# Patient Record
Sex: FEMALE | Race: WHITE | Employment: OTHER | URBAN - METROPOLITAN AREA
[De-identification: names, ages, dates, MRNs, and addresses within clinical notes are randomized per-mention and may not be internally consistent; named-entity substitution may affect disease eponyms.]

---

## 2018-06-07 ENCOUNTER — TELEPHONE (OUTPATIENT)
Dept: CARDIOLOGY CLINIC | Age: 63
End: 2018-06-07

## 2018-06-07 ENCOUNTER — OFFICE VISIT (OUTPATIENT)
Dept: ORTHOPEDIC SURGERY | Age: 63
End: 2018-06-07

## 2018-06-07 VITALS — WEIGHT: 164 LBS | HEIGHT: 66 IN | BODY MASS INDEX: 26.36 KG/M2

## 2018-06-07 VITALS — HEIGHT: 66 IN | WEIGHT: 164.9 LBS | BODY MASS INDEX: 26.5 KG/M2

## 2018-06-07 DIAGNOSIS — M16.11 PRIMARY OSTEOARTHRITIS OF RIGHT HIP: Primary | ICD-10-CM

## 2018-06-07 DIAGNOSIS — R07.2 PRECORDIAL PAIN: Primary | ICD-10-CM

## 2018-06-07 DIAGNOSIS — R07.2 PRECORDIAL PAIN: ICD-10-CM

## 2018-06-07 DIAGNOSIS — I25.119 CORONARY ARTERY DISEASE INVOLVING NATIVE CORONARY ARTERY OF NATIVE HEART WITH ANGINA PECTORIS (HCC): Primary | ICD-10-CM

## 2018-06-07 DIAGNOSIS — I25.119 CORONARY ARTERY DISEASE INVOLVING NATIVE CORONARY ARTERY OF NATIVE HEART WITH ANGINA PECTORIS (HCC): ICD-10-CM

## 2018-06-07 DIAGNOSIS — M87.051 AVASCULAR NECROSIS OF BONE OF HIP, RIGHT (HCC): ICD-10-CM

## 2018-06-07 PROCEDURE — 99214 OFFICE O/P EST MOD 30 MIN: CPT | Performed by: ORTHOPAEDIC SURGERY

## 2018-06-07 RX ORDER — AMLODIPINE BESYLATE 5 MG/1
5 TABLET ORAL DAILY
COMMUNITY
End: 2018-09-10 | Stop reason: SDUPTHER

## 2018-06-07 RX ORDER — FLUOXETINE HYDROCHLORIDE 20 MG/1
20 CAPSULE ORAL DAILY
COMMUNITY
End: 2020-01-27 | Stop reason: ALTCHOICE

## 2018-06-13 ENCOUNTER — HOSPITAL ENCOUNTER (OUTPATIENT)
Dept: CARDIOLOGY | Facility: CLINIC | Age: 63
Discharge: HOME | End: 2018-06-13
Attending: INTERNAL MEDICINE | Admitting: INTERNAL MEDICINE

## 2018-06-13 DIAGNOSIS — R07.2 PRECORDIAL PAIN: ICD-10-CM

## 2018-06-13 LAB
LV EF: 50 %
LVEF MODALITY: NORMAL

## 2018-06-14 ENCOUNTER — HOSPITAL ENCOUNTER (OUTPATIENT)
Dept: CARDIOLOGY | Facility: CLINIC | Age: 63
Discharge: HOME | End: 2018-06-14
Attending: INTERNAL MEDICINE | Admitting: INTERNAL MEDICINE

## 2018-06-14 DIAGNOSIS — R07.2 PRECORDIAL PAIN: ICD-10-CM

## 2018-06-14 LAB
LV EF: 66 %
LVEF MODALITY: NORMAL

## 2018-06-18 ENCOUNTER — OFFICE VISIT (OUTPATIENT)
Dept: CARDIOLOGY CLINIC | Age: 63
End: 2018-06-18

## 2018-06-18 VITALS
SYSTOLIC BLOOD PRESSURE: 120 MMHG | BODY MASS INDEX: 25.23 KG/M2 | WEIGHT: 157 LBS | OXYGEN SATURATION: 96 % | DIASTOLIC BLOOD PRESSURE: 68 MMHG | HEART RATE: 67 BPM | HEIGHT: 66 IN

## 2018-06-18 DIAGNOSIS — I25.10 CORONARY ARTERY DISEASE INVOLVING NATIVE CORONARY ARTERY OF NATIVE HEART WITHOUT ANGINA PECTORIS: ICD-10-CM

## 2018-06-18 DIAGNOSIS — I10 ESSENTIAL HYPERTENSION: ICD-10-CM

## 2018-06-18 DIAGNOSIS — Z01.810 PREOP CARDIOVASCULAR EXAM: Primary | ICD-10-CM

## 2018-06-18 DIAGNOSIS — I48.91 ATRIAL FIBRILLATION, UNSPECIFIED TYPE (HCC): ICD-10-CM

## 2018-06-18 PROCEDURE — 99215 OFFICE O/P EST HI 40 MIN: CPT | Performed by: INTERNAL MEDICINE

## 2018-06-18 RX ORDER — FUROSEMIDE 20 MG/1
20 TABLET ORAL 2 TIMES DAILY
Qty: 60 TABLET | Refills: 6
Start: 2018-06-18 | End: 2020-12-07

## 2018-06-18 RX ORDER — LOSARTAN POTASSIUM 100 MG/1
100 TABLET ORAL DAILY
Qty: 30 TABLET | Refills: 6
Start: 2018-06-18 | End: 2018-09-20 | Stop reason: SDUPTHER

## 2018-06-18 RX ORDER — ASPIRIN 81 MG/1
81 TABLET ORAL DAILY
Qty: 30 TABLET | Refills: 3 | Status: SHIPPED | OUTPATIENT
Start: 2018-06-18 | End: 2018-10-20 | Stop reason: SDUPTHER

## 2018-06-19 ENCOUNTER — HOSPITAL ENCOUNTER (OUTPATIENT)
Dept: OTHER | Age: 63
Discharge: HOME | End: 2018-06-19
Attending: ORTHOPAEDIC SURGERY | Admitting: ORTHOPAEDIC SURGERY

## 2018-06-19 ENCOUNTER — TELEPHONE (OUTPATIENT)
Dept: ORTHOPEDIC SURGERY | Age: 63
End: 2018-06-19

## 2018-06-19 LAB
ALBUMIN SERPL-MCNC: 4.6 G/DL (ref 3.4–5)
ANION GAP SERPL CALCULATED.3IONS-SCNC: 18 MMOL/L (ref 3–16)
APTT: 35.6 SEC (ref 26–36)
BASOPHILS ABSOLUTE: 0.1 K/UL (ref 0–0.2)
BASOPHILS RELATIVE PERCENT: 0.8 %
BILIRUBIN URINE: NEGATIVE
BLOOD, URINE: NEGATIVE
BUN BLDV-MCNC: 14 MG/DL (ref 7–20)
CALCIUM SERPL-MCNC: 9.6 MG/DL (ref 8.3–10.6)
CHLORIDE BLD-SCNC: 98 MMOL/L (ref 99–110)
CLARITY: CLEAR
CO2: 24 MMOL/L (ref 21–32)
COLOR: YELLOW
CREAT SERPL-MCNC: 0.6 MG/DL (ref 0.6–1.2)
EOSINOPHILS ABSOLUTE: 0.2 K/UL (ref 0–0.6)
EOSINOPHILS RELATIVE PERCENT: 3.6 %
GFR AFRICAN AMERICAN: >60
GFR NON-AFRICAN AMERICAN: >60
GLUCOSE BLD-MCNC: 122 MG/DL (ref 70–99)
GLUCOSE URINE: NEGATIVE MG/DL
HCT VFR BLD CALC: 34.8 % (ref 36–48)
HEMOGLOBIN: 12 G/DL (ref 12–16)
INR BLD: 1.13 (ref 0.86–1.14)
KETONES, URINE: NEGATIVE MG/DL
LEUKOCYTE ESTERASE, URINE: NEGATIVE
LYMPHOCYTES ABSOLUTE: 2.6 K/UL (ref 1–5.1)
LYMPHOCYTES RELATIVE PERCENT: 40.1 %
MCH RBC QN AUTO: 29.9 PG (ref 26–34)
MCHC RBC AUTO-ENTMCNC: 34.6 G/DL (ref 31–36)
MCV RBC AUTO: 86.3 FL (ref 80–100)
MICROSCOPIC EXAMINATION: NORMAL
MONOCYTES ABSOLUTE: 0.7 K/UL (ref 0–1.3)
MONOCYTES RELATIVE PERCENT: 11 %
NEUTROPHILS ABSOLUTE: 2.9 K/UL (ref 1.7–7.7)
NEUTROPHILS RELATIVE PERCENT: 44.5 %
NITRITE, URINE: NEGATIVE
PDW BLD-RTO: 14.1 % (ref 12.4–15.4)
PH UA: 6
PLATELET # BLD: 265 K/UL (ref 135–450)
PMV BLD AUTO: 8.3 FL (ref 5–10.5)
POTASSIUM SERPL-SCNC: 3.9 MMOL/L (ref 3.5–5.1)
PROTEIN UA: NEGATIVE MG/DL
PROTHROMBIN TIME: 12.9 SEC (ref 9.8–13)
RBC # BLD: 4.03 M/UL (ref 4–5.2)
SODIUM BLD-SCNC: 140 MMOL/L (ref 136–145)
SPECIFIC GRAVITY UA: <=1.005
TRANSFERRIN: 319 MG/DL (ref 200–360)
URINE TYPE: NORMAL
UROBILINOGEN, URINE: 0.2 E.U./DL
WBC # BLD: 6.5 K/UL (ref 4–11)

## 2018-06-20 LAB — URINE CULTURE, ROUTINE: NORMAL

## 2018-06-22 ENCOUNTER — TELEPHONE (OUTPATIENT)
Dept: CARDIOLOGY CLINIC | Age: 63
End: 2018-06-22

## 2018-06-26 ENCOUNTER — TELEPHONE (OUTPATIENT)
Dept: ORTHOPEDIC SURGERY | Age: 63
End: 2018-06-26

## 2018-06-28 PROBLEM — Z96.641 HISTORY OF TOTAL RIGHT HIP REPLACEMENT: Status: ACTIVE | Noted: 2018-06-28

## 2018-07-03 ENCOUNTER — TELEPHONE (OUTPATIENT)
Dept: ORTHOPEDIC SURGERY | Age: 63
End: 2018-07-03

## 2018-07-03 NOTE — TELEPHONE ENCOUNTER
Spoke with pt, doing pretty good. Incision status: No drainage or redness    Edema/Swelling: Still swelling, icing is helping. Pain level and status: Doing good    Use of pain medications: 1 percocet as needed.      Blood thinner: Eliquis going well    Bowels: Had BM, going fine    Home Care [facility] active: [facility]     Outpatient therapy: NA    Do you have all of your medications: Yes    Changes in medications: No    Ortho Vitals: NA    Follow up appointments:    Future Appointments  Date Time Provider Hiwot Pretty   7/13/2018 10:45 AM SKYLAR Dalal [facility]

## 2018-07-05 ENCOUNTER — TELEPHONE (OUTPATIENT)
Dept: ORTHOPEDIC SURGERY | Age: 63
End: 2018-07-05

## 2018-07-05 NOTE — TELEPHONE ENCOUNTER
Patient reports that she is getting some rash around her incision. She had the nurse looked at it. It does not look infected, just irritating. The area was cleansed and dried today. She is going to take some Benadryl for the itching and try to avoid excoriation. If her symptoms worsen, she will call the office for an antibiotic. Otherwise we will plan to see her as scheduled next week.

## 2018-07-11 ENCOUNTER — TELEPHONE (OUTPATIENT)
Dept: ORTHOPEDIC SURGERY | Age: 63
End: 2018-07-11

## 2018-07-13 ENCOUNTER — OFFICE VISIT (OUTPATIENT)
Dept: ORTHOPEDIC SURGERY | Age: 63
End: 2018-07-13

## 2018-07-13 VITALS — HEIGHT: 66 IN | WEIGHT: 164 LBS | BODY MASS INDEX: 26.36 KG/M2

## 2018-07-13 DIAGNOSIS — Z96.641 HISTORY OF TOTAL RIGHT HIP REPLACEMENT: Primary | ICD-10-CM

## 2018-07-13 PROCEDURE — 99024 POSTOP FOLLOW-UP VISIT: CPT | Performed by: PHYSICIAN ASSISTANT

## 2018-07-18 PROBLEM — Z01.810 PREOP CARDIOVASCULAR EXAM: Status: RESOLVED | Noted: 2018-06-18 | Resolved: 2018-07-18

## 2018-07-19 ENCOUNTER — TELEPHONE (OUTPATIENT)
Dept: ORTHOPEDIC SURGERY | Age: 63
End: 2018-07-19

## 2018-07-19 NOTE — TELEPHONE ENCOUNTER
Nurse Navigator called patient for one final follow up:  Pt doing pretty good, switched to a cane today. Started with out pt PT on Monday. Pt has no questions or concerns. Signed off from pt. Instructed pt to call RN or [place] office with any issues or concerns.     Cecelia Hernandes  Orthopedic Nurse Navigator  Phone number: [phone]

## 2018-07-23 ENCOUNTER — HOSPITAL ENCOUNTER (OUTPATIENT)
Dept: PHYSICAL THERAPY | Age: 63
Setting detail: THERAPIES SERIES
Discharge: HOME | End: 2018-07-23
Payer: COMMERCIAL

## 2018-07-23 PROCEDURE — G8979 MOBILITY GOAL STATUS: HCPCS

## 2018-07-23 PROCEDURE — 97161 PT EVAL LOW COMPLEX 20 MIN: CPT

## 2018-07-23 PROCEDURE — G8978 MOBILITY CURRENT STATUS: HCPCS

## 2018-07-23 ASSESSMENT — PAIN DESCRIPTION - PAIN TYPE: TYPE: ACUTE PAIN

## 2018-07-23 ASSESSMENT — PAIN DESCRIPTION - LOCATION: LOCATION: HIP

## 2018-07-23 ASSESSMENT — PAIN DESCRIPTION - ORIENTATION: ORIENTATION: RIGHT

## 2018-07-23 ASSESSMENT — PAIN SCALES - GENERAL: PAINLEVEL_OUTOF10: 3

## 2018-07-23 NOTE — PROGRESS NOTES
blood flow stopped to the socket. Patient has had significant pain since then. Patient had an ill  and did not get right hip checked until the end of May 2018. Patient had MRI showing need for a total hip. Patient had right total hip on June 28, 2018. Patient had therapy in the home until 7/20. Patient is now full weightbearing.         Pain:  Patient Currently in Pain: Yes  Pain Assessment: 0-10  Pain Level: 3  Pain Type: Acute pain  Pain Location: Hip  Pain Orientation: Right     Social/Functional History:    Lives With: Other (comment) (Currently living with sister)  Type of Home: House  Home Layout: One level  Home Access: Stairs to enter with rails  Entrance Stairs - Number of Steps: 2 steps  Home Equipment: Cane, Standard walker     Bathroom Shower/Tub: Shower chair without back  [employer]: Grab bars in shower             Homemaking Responsibilities: No  Ambulation Assistance: Independent  Transfer Assistance: Independent    Active : No  Occupation: Retired    Objective  Overall Orientation Status: Within Normal Limits             Vision: Within Functional Limits    Hearing: Within functional limits      Observation/Palpation  Palpation: Right hip and IT band tender with pressure           R SLR: 90  R Hip ABduction 0-45: 32         L Hip Flexion 0-125: 109  L Hip ABduction 0-45: 43     R Hip Flexion: 3-/5  R Hip ABduction: 3-/5    L Hip Flexion: 5/5  L Hip ABduction: 5/5  L Hip ADduction: 5/5  L Knee Flexion: 5/5  L Knee Extension: 5/5    Tone RLE  RLE Tone: Normotonic  Tone LLE  LLE Tone: Normotonic                  Overall Sensation Status: WNL           Supine to Sit: Modified independent  Sit to Supine: Modified independent                Transfers  Sit to Stand: Modified independent  Stand to sit: Modified independent            WB Status: WBAT  Ambulation 1  Surface: carpet  Device: Single point cane  Assistance: Modified Independent  Quality of Gait: Decreased pace, decreased heel strike to toe off, decreased stride length. Distance: 150 feet                               Exercises  Exercise 1: Next session: SLR, supine hip abduction, glut set, heel slide, quad set  Exercise 2: Next sessoin: NuStep  Exercise 3: Next session: 3-way hip  Exercise 4: Next session: heel/toe raise  Exercise 5: Next session: march, mini squat, hamstring curl                          Activity Tolerance:  Activity Tolerance: Patient Tolerated treatment well    Assessment: Body structures, Functions, Activity limitations: Decreased functional mobility , Decreased ROM, Decreased ADL status, Decreased strength, Decreased endurance, Decreased sensation, Decreased balance  Prognosis: Good  REQUIRES PT FOLLOW UP: Yes  Discharge Recommendations: Continue to assess pending progress    Patient Education:  Patient Education: Patient educated on POC and HEP                   Plan:  Times per week: 2-3 times per week  Plan weeks: 12 weeks  Specific instructions for Next Treatment: Core and LE stretches and strengthening, ambulation, balance, modalities as needed  Current Treatment Recommendations: Strengthening, ROM, Balance Training, Endurance Training, Stair training, Pain Management, Positioning, Aquatics, Modalities, Gait Training, Manual Therapy - Soft Tissue Mobilization, Transfer Training, Functional Mobility Training, Home Exercise Program  Plan Comment: POC initiated    History: Personal factors or comorbidities that impact plan of care -  Moderate Complexity: 1-2 personal factors or comorbidities. See history section above for details. Examination: Body structures and functions, activity limitations, participation restrictions; using standardized tests and measures - Low Complexity: 1-2 body structures and functional, activity limitation and/or participation restrictions. See restrictions and objective section above for details. Clinical Presentation: Low - Stable and Uncomplicated: Right total hip. Progressing well. Decision Making: Low Complexity due to see above. Decision making was based on patient assessment and decision making process in determining plan of care and establishing reasonable expectations for measurable functional outcomes. Evaluation Complexity: Based on the findings of patient history, examination, clinical presentation, and decision making during this evaluation, the evaluation of Aleida Camarillo  is of low complexity. Goals:  Patient goals : \"Walk without assistance\"    Short term goals  Time Frame for Short term goals: 4 weeks  Short term goal 1: Patient able to ambulate with straight cane with good pace, and no gait deviations. Short term goal 2: Decrease right hip pain to 1/10 to assist with sleeping at night. Short term goal 3: Increase right hip strength to 4+/5 to assist with stability on steps. Short term goal 4: Improve balance to allow patient to SLS on right x3 seconds to assist with getting dressed. Long term goals  Time Frame for Long term goals : 12 weeks  Long term goal 1: Independent with HEP and with progression to assist with decreasing pain. PT G-Codes  Functional Assessment Tool Used: Pain Scale  Score: 3/10  Functional Limitation: Mobility: Walking and moving around  Mobility: Walking and Moving Around Current Status (): At least 20 percent but less than 40 percent impaired, limited or restricted  Mobility: Walking and Moving Around Goal Status ():  At least 1 percent but less than 20 percent impaired, limited or restricted    Missael Yañez

## 2018-07-25 ENCOUNTER — HOSPITAL ENCOUNTER (OUTPATIENT)
Dept: PHYSICAL THERAPY | Age: 63
Setting detail: THERAPIES SERIES
Discharge: HOME | End: 2018-07-25
Payer: COMMERCIAL

## 2018-07-25 PROCEDURE — 97110 THERAPEUTIC EXERCISES: CPT

## 2018-07-25 RX ORDER — APIXABAN 5 MG/1
TABLET, FILM COATED ORAL
Qty: 90 TABLET | Refills: 2 | Status: SHIPPED | OUTPATIENT
Start: 2018-07-25 | End: 2018-09-20 | Stop reason: SDUPTHER

## 2018-07-25 ASSESSMENT — PAIN SCALES - GENERAL: PAINLEVEL_OUTOF10: 3

## 2018-07-25 NOTE — PROGRESS NOTES
[place]     Time In: 1400  Time Out: 1447  Minutes: 47  Timed Code Treatment Minutes: 47 Minutes     Date: 2018  Patient Name: Kayla Blum,  Gender:  female        CSN: [id number]   : 1955  (58 y.o.)  Referral Date : 18    Referring Practitioner: Meme Bolanos MD      Diagnosis: Right Total Hip Replacement  Treatment Diagnosis: Difficulty with ambulation, Right hip pain   Additional Pertinent Hx: High Blood Pressure, Irregular Heart Beat, Asthma, Diabetes, Anxiety Disorder, Short of Breath, Total Hip Precautions       General:  PT Visit Information  Onset Date: 18  PT Insurance Information: [employer] BC/BS -75 VISITS PT/OT/ST COMBINED PER CALENDAR YEAR (2 VISITS USED). Aquatics and modalities are covered. Total # of Visits Approved: 73  Total # of Visits to Date: 2  Plan of Care/Certification Expiration Date: 10/15/18  Progress Note Counter: 2/10 for PN             Subjective:  Chart Reviewed: Yes  Patient assessed for rehabilitation services?: Yes  Family / Caregiver Present: Yes  Comments: Return to doctor when therapy is over. Subjective: Patient states that she is having pain in the side of her right hip. Patient walking with cane but wanting to get rid of the cane as soon as she is able.      Pain:  Patient Currently in Pain: Yes  Pain Assessment: 0-10  Pain Level: 3 (Right hip)    Objective         Exercises  Exercise 1: Reviewed SLR, supine hip abduction, glut set, heel slide, quad set x10-20 each - patient is doing at home from home health  Exercise 2: NuStep level 2 x6 minutes  Exercise 3: 3-way hip x10 bilateral   Exercise 4: Heel/toe raise, march, mini squat, hamstring curl x10 bilateral  Exercise 5: Rockerboard 2 directions x10 each; Balance 30 seconds each way  Exercise 6: Balance on Blue foam: step stance bilateral x30 seconds each        Activity Tolerance:  Activity Tolerance: Patient Tolerated treatment well  Activity Tolerance: Pain still 3/10 at end of session. Assessment: Body structures, Functions, Activity limitations: Decreased functional mobility , Decreased ROM, Decreased ADL status, Decreased strength, Decreased endurance, Decreased sensation, Decreased balance  Assessment: Patient tolerated activities well with no complaints of increased pain at end of session. Patient ambulated short distances without cane but had increased antalgia. Therapist raised cane 2 notches and patient demonstrated less limp and improved posture. Not ready to be without the cane at this time and to work on gait pattern. Prognosis: Good  REQUIRES PT FOLLOW UP: Yes  Discharge Recommendations: Continue to assess pending progress    Patient Education:  Patient Education: Continue HEP     Plan:  Times per week: 2-3 times per week  Plan weeks: 12 weeks  Specific instructions for Next Treatment: Core and LE stretches and strengthening, ambulation, balance, modalities as needed  Current Treatment Recommendations: Strengthening, ROM, Balance Training, Endurance Training, Stair training, Pain Management, Positioning, Aquatics, Modalities, Gait Training, Manual Therapy - Soft Tissue Mobilization, Transfer Training, Functional Mobility Training, Home Exercise Program  Plan Comment: Continue with current POC    Goals:  Patient goals : \"Walk without assistance\"    Short term goals  Time Frame for Short term goals: 4 weeks  Short term goal 1: Patient able to ambulate with straight cane with good pace, and no gait deviations. Short term goal 2: Decrease right hip pain to 1/10 to assist with sleeping at night. Short term goal 3: Increase right hip strength to 4+/5 to assist with stability on steps. Short term goal 4: Improve balance to allow patient to SLS on right x3 seconds to assist with getting dressed.     Long term goals  Time Frame for Long term goals : 12 weeks  Long term goal 1: Independent with HEP and with progression to assist with decreasing pain. Arleth Corrigan.  Crispin Monique, [address] [facility] Emy, 7/25/2018

## 2018-07-27 ENCOUNTER — HOSPITAL ENCOUNTER (OUTPATIENT)
Dept: PHYSICAL THERAPY | Age: 63
Setting detail: THERAPIES SERIES
Discharge: HOME | End: 2018-07-27
Payer: COMMERCIAL

## 2018-07-27 PROCEDURE — 97110 THERAPEUTIC EXERCISES: CPT

## 2018-07-27 ASSESSMENT — PAIN DESCRIPTION - LOCATION: LOCATION: HIP

## 2018-07-27 ASSESSMENT — PAIN SCALES - GENERAL: PAINLEVEL_OUTOF10: 2

## 2018-07-27 ASSESSMENT — PAIN DESCRIPTION - ORIENTATION: ORIENTATION: RIGHT

## 2018-07-27 ASSESSMENT — PAIN DESCRIPTION - PAIN TYPE: TYPE: ACUTE PAIN

## 2018-07-27 NOTE — PROGRESS NOTES
Recommendations: Continue to assess pending progress    Patient Education:  Patient Education: Continue HEP                       Plan:  Times per week: 2-3 times per week  Plan weeks: 12 weeks  Specific instructions for Next Treatment: Core and LE stretches and strengthening, ambulation, balance, modalities as needed  Plan Comment: Continue with current POC    Goals:  Patient goals : \"Walk without assistance\"    Short term goals  Time Frame for Short term goals: 4 weeks  Short term goal 1: Patient able to ambulate with straight cane with good pace, and no gait deviations. Short term goal 2: Decrease right hip pain to 1/10 to assist with sleeping at night. Short term goal 3: Increase right hip strength to 4+/5 to assist with stability on steps. Short term goal 4: Improve balance to allow patient to SLS on right x3 seconds to assist with getting dressed. Long term goals  Time Frame for Long term goals : 12 weeks  Long term goal 1: Independent with HEP and with progression to assist with decreasing pain.          Missael Garcia

## 2018-07-30 ENCOUNTER — HOSPITAL ENCOUNTER (OUTPATIENT)
Dept: PHYSICAL THERAPY | Age: 63
Setting detail: THERAPIES SERIES
Discharge: HOME | End: 2018-07-30
Payer: COMMERCIAL

## 2018-07-30 PROCEDURE — 97110 THERAPEUTIC EXERCISES: CPT

## 2018-07-30 ASSESSMENT — PAIN SCALES - GENERAL: PAINLEVEL_OUTOF10: 4

## 2018-07-30 NOTE — PROGRESS NOTES
[place]     Time In: 1600  Time Out: 1625  Minutes: 25  Timed Code Treatment Minutes: 25 Minutes     Date: 2018  Patient Name: Bienvenido Arndt,  Gender:  female        CSN: [id number]   : 1955  (58 y.o.)  Referral Date : 18    Referring Practitioner: Blake Bailon MD      Diagnosis: Right Total Hip Replacement   Additional Pertinent Hx: High Blood Pressure, Irregular Heart Beat, Asthma, Diabetes, Anxiety Disorder, Short of Breath, Total Hip Precautions                  General:  PT Visit Information  Onset Date: 18  PT Insurance Information: [employer] BC/BS -76 VISITS PT/OT/ST COMBINED PER CALENDAR YEAR (2 VISITS USED). Aquatics and modalities are covered. Total # of Visits Approved: 73  Total # of Visits to Date: 4  Plan of Care/Certification Expiration Date: 10/15/18  Progress Note Counter: 4/10 for PN               Subjective:  Comments: Return to doctor when therapy is over. Subjective: Patient reporting increase pain today right leg, not incident to cause. . She also states did more walking yesterday and drove car     Pain:  Patient Currently in Pain: Yes  Pain Assessment: 0-10  Pain Level: 4 (R groin, hip, knee, ankle)      Objective    Exercises  Exercise 2: NuStep level 3 x5 minutes  Exercise 3: 3-way hip x10 bilateral   Exercise 4: Heel/toe raise, march, mini squat, hamstring curl x10 bilateral         Activity Tolerance:  Activity Tolerance: Treatment limited secondary to medical complications (free text)    Assessment:  Assessment: patient with increase c/os pain today,  also c/os dizziness with standing ex, decrease HR( has Apple watch to moniter)--she needed few minutes to lie down. HR increased and dizziness resolved.    did not get thru all previous ex.   leg length appears even,  no increase swelling right hip   did states pain a little less after rx    Patient Education:  Patient Education: continue ice , OTC,  back off some of ex                      Plan:  Times per week: 2-3 times per week  Plan weeks: 12 weeks  Specific instructions for Next Treatment: Core and LE stretches and strengthening, ambulation, balance, modalities as needed  Current Treatment Recommendations: Strengthening, ROM, Balance Training, Endurance Training, Stair training, Pain Management, Positioning, Aquatics, Modalities, Gait Training, Manual Therapy - Soft Tissue Mobilization, Transfer Training, Functional Mobility Training, Home Exercise Program  Plan Comment: Continue with current POC    Goals:  Patient goals : \"Walk without assistance\"    Short term goals  Time Frame for Short term goals: 4 weeks  Short term goal 1: Patient able to ambulate with straight cane with good pace, and no gait deviations. Short term goal 2: Decrease right hip pain to 1/10 to assist with sleeping at night. Short term goal 3: Increase right hip strength to 4+/5 to assist with stability on steps. Short term goal 4: Improve balance to allow patient to SLS on right x3 seconds to assist with getting dressed. Long term goals  Time Frame for Long term goals : 12 weeks  Long term goal 1: Independent with HEP and with progression to assist with decreasing pain.          Neris Fan PTA

## 2018-08-01 ENCOUNTER — HOSPITAL ENCOUNTER (OUTPATIENT)
Dept: PHYSICAL THERAPY | Age: 63
Setting detail: THERAPIES SERIES
Discharge: HOME | End: 2018-08-01
Payer: COMMERCIAL

## 2018-08-01 PROCEDURE — 97110 THERAPEUTIC EXERCISES: CPT

## 2018-08-01 ASSESSMENT — PAIN SCALES - GENERAL: PAINLEVEL_OUTOF10: 2

## 2018-08-01 ASSESSMENT — PAIN DESCRIPTION - PAIN TYPE: TYPE: ACUTE PAIN

## 2018-08-01 NOTE — PROGRESS NOTES
[place]     Time In: 1330  Time Out: 1408  Minutes: 38  Timed Code Treatment Minutes: 38 Minutes     Date: 2018  Patient Name: Amaury Huizar,  Gender:  female        CSN: [id number]   : 1955  (58 y.o.)  Referral Date : 18    Referring Practitioner: Mark Barrera MD      Diagnosis: Right Total Hip Replacement   Additional Pertinent Hx: High Blood Pressure, Irregular Heart Beat, Asthma, Diabetes, Anxiety Disorder, Short of Breath, Total Hip Precautions                  General:  PT Visit Information  Onset Date: 18  PT Insurance Information: [employer]/BS -76 VISITS PT/OT/ST COMBINED PER CALENDAR YEAR (2 VISITS USED). Aquatics and modalities are covered. Total # of Visits Approved: 73  Total # of Visits to Date: 5  Plan of Care/Certification Expiration Date: 10/15/18  Progress Note Counter: 5/10 for PN               Subjective:  Comments: Return to doctor when therapy is over. Subjective: Patient state much less pain right hip today,  no pain lower leg. .  She states yesterday did not do much, rested more     Pain:  Patient Currently in Pain: Yes  Pain Assessment: 0-10  Pain Level: 2  Pain Type: Acute pain (right hip and groin)      Objective                                                                       Exercises  Exercise 2: NuStep level 1 x5 minutes  Exercise 3: 3-way hip x15 bilateral   Exercise 4: Heel/toe raise, march, mini squat, hamstring curl x15 bilateral  Exercise 5: Rockerboard 2 directions x15 each; Balance 30 seconds each way  Exercise 6: Balance on Blue foam: step stance bilateral x30 seconds each   Exercise 9: step stance at hydrostick --for/back, side/side x10 each,   switch   Exercise 10: in hallway sidestep along railing x1 lap, light touch,    walk backwards , using cane and railing x1 lap--slow guarded but no increase pain         Activity Tolerance:  Activity Tolerance: Patient Tolerated treatment well    Assessment:  Assessment: doing better today, no rest breaks needed, no c/os dizziness. increased reps to 15 most ex,   added hydrostick and sideways, backwards walking       Patient Education:  Patient Education: progress activity at home slowly                      Plan:  Times per week: 2-3 times per week  Plan weeks: 12 weeks  Specific instructions for Next Treatment: Core and LE stretches and strengthening, ambulation, balance, modalities as needed  Current Treatment Recommendations: Strengthening, ROM, Balance Training, Endurance Training, Stair training, Pain Management, Positioning, Aquatics, Modalities, Gait Training, Manual Therapy - Soft Tissue Mobilization, Transfer Training, Functional Mobility Training, Home Exercise Program  Plan Comment: Continue with current POC    Goals:  Patient goals : \"Walk without assistance\"    Short term goals  Time Frame for Short term goals: 4 weeks  Short term goal 1: Patient able to ambulate with straight cane with good pace, and no gait deviations. Short term goal 2: Decrease right hip pain to 1/10 to assist with sleeping at night. Short term goal 3: Increase right hip strength to 4+/5 to assist with stability on steps. Short term goal 4: Improve balance to allow patient to SLS on right x3 seconds to assist with getting dressed. Long term goals  Time Frame for Long term goals : 12 weeks  Long term goal 1: Independent with HEP and with progression to assist with decreasing pain.          Jordan Fan PTA

## 2018-08-03 ENCOUNTER — HOSPITAL ENCOUNTER (OUTPATIENT)
Dept: PHYSICAL THERAPY | Age: 63
Setting detail: THERAPIES SERIES
Discharge: HOME | End: 2018-08-03
Payer: COMMERCIAL

## 2018-08-03 PROCEDURE — 97110 THERAPEUTIC EXERCISES: CPT

## 2018-08-03 ASSESSMENT — PAIN SCALES - GENERAL: PAINLEVEL_OUTOF10: 1

## 2018-08-03 ASSESSMENT — PAIN DESCRIPTION - ORIENTATION: ORIENTATION: RIGHT

## 2018-08-03 ASSESSMENT — PAIN DESCRIPTION - LOCATION: LOCATION: HIP

## 2018-08-03 ASSESSMENT — PAIN DESCRIPTION - PAIN TYPE: TYPE: ACUTE PAIN

## 2018-08-03 NOTE — PROGRESS NOTES
[facility]     Time In: 1430  Time Out: 1500  Minutes: 30  Timed Code Treatment Minutes: 30 Minutes     Date: 8/3/2018  Patient Name: Sharif Ji,  Gender:  female        CSN: [id number]   : 1955  (58 y.o.)       Referring Practitioner: Bean Galeano MD      Diagnosis: Right Total Hip Replacement  Treatment Diagnosis: Difficulty with ambulation, Right hip pain   Additional Pertinent Hx: High Blood Pressure, Irregular Heart Beat, Asthma, Diabetes, Anxiety Disorder, Short of Breath, Total Hip Precautions                  General:  PT Visit Information  Onset Date: 18  PT Insurance Information: [employer] BC/BS -76 VISITS PT/OT/ST COMBINED PER CALENDAR YEAR (2 VISITS USED). Aquatics and modalities are covered. Total # of Visits Approved: 73  Total # of Visits to Date: 6  Progress Note Counter: 6/10 for PN               Subjective:  Family / Caregiver Present: No  Comments: Return to doctor when therapy is over. Subjective: Patient reports she is back to where she was after she flaired up her pain.      Pain:  Patient Currently in Pain: Yes  Pain Assessment: 0-10  Pain Level: 1  Pain Type: Acute pain  Pain Location: Hip  Pain Orientation: Right      Objective    Exercises  Exercise 2: NuStep level 2 x5 minutes  Exercise 3: 3-way hip x15 bilateral   Exercise 4: Heel/toe raise, march, mini squat, hamstring curl x15 bilateral  Exercise 5: Rockerboard 2 directions x15 each; Balance 30 seconds each way  Exercise 6: Balance on Blue foam: step stance bilateral x30 seconds each   Exercise 7: 4inch step ups x10 each forward and lateral  Exercise 9: step stance at hydrostick --for/back, side/side x10 each,   switch   Exercise 10: in hallway sidestep along railing x1 lap, light touch,    walk backwards , using cane and railing x1 lap--slow guarded but no increase pain         Activity Tolerance:  Activity Tolerance: Patient Tolerated treatment well    Assessment:  Assessment: No rest breaks, moving better. Prognosis: Good  REQUIRES PT FOLLOW UP: Yes  Discharge Recommendations: Continue to assess pending progress    Patient Education:  Patient Education: Patient educated on working on HEP routinely                      Plan:  Times per week: 2-3 times per week  Plan weeks: 12 weeks  Specific instructions for Next Treatment: Core and LE stretches and strengthening, ambulation, balance, modalities as needed  Plan Comment: Continue with current POC    Goals:  Patient goals : \"Walk without assistance\"    Short term goals  Time Frame for Short term goals: 4 weeks  Short term goal 1: Patient able to ambulate with straight cane with good pace, and no gait deviations. Short term goal 2: Decrease right hip pain to 1/10 to assist with sleeping at night. Short term goal 3: Increase right hip strength to 4+/5 to assist with stability on steps. Short term goal 4: Improve balance to allow patient to SLS on right x3 seconds to assist with getting dressed. Long term goals  Time Frame for Long term goals : 12 weeks  Long term goal 1: Independent with HEP and with progression to assist with decreasing pain.          Missael Mireles

## 2018-08-06 ENCOUNTER — APPOINTMENT (OUTPATIENT)
Dept: PHYSICAL THERAPY | Age: 63
End: 2018-08-06
Payer: COMMERCIAL

## 2018-08-08 ENCOUNTER — APPOINTMENT (OUTPATIENT)
Dept: PHYSICAL THERAPY | Age: 63
End: 2018-08-08
Payer: COMMERCIAL

## 2018-08-10 ENCOUNTER — HOSPITAL ENCOUNTER (OUTPATIENT)
Dept: PHYSICAL THERAPY | Age: 63
Setting detail: THERAPIES SERIES
Discharge: HOME | End: 2018-08-10
Payer: COMMERCIAL

## 2018-08-10 PROCEDURE — 97110 THERAPEUTIC EXERCISES: CPT

## 2018-08-10 ASSESSMENT — PAIN SCALES - GENERAL: PAINLEVEL_OUTOF10: 1

## 2018-08-10 NOTE — PROGRESS NOTES
[place]     Time In: 1300  Time Out: [address]  Minutes: 42  Timed Code Treatment Minutes: 42 Minutes     Date: 8/10/2018  Patient Name: Jostin Andrade,  Gender:  female        CSN: [id number]   : 1955  (58 y.o.)       Referring Practitioner: Kathy Onofre MD      Diagnosis: Right Total Hip Replacement  Treatment Diagnosis: Difficulty with ambulation, Right hip pain   Additional Pertinent Hx: High Blood Pressure, Irregular Heart Beat, Asthma, Diabetes, Anxiety Disorder, Short of Breath, Total Hip Precautions       General:  PT Visit Information  Onset Date: 18  PT Insurance Information: ANTHEM BC/BS -75 VISITS PT/OT/ST COMBINED PER CALENDAR YEAR (2 VISITS USED). Aquatics and modalities are covered. Total # of Visits Approved: 73  Total # of Visits to Date: 7  Progress Note Counter: 7/10 for PN             Subjective:  Family / Caregiver Present: No  Comments: Return to doctor when therapy is over. Subjective: Patient reports she is just a little sore today but not having any pain. States that she feels like she has gotten stronger and she can walk mostly without a limp in the mornings. Pain:  Patient Currently in Pain: Yes  Pain Assessment: 0-10  Pain Level: 1 (Right hip)    Objective         Exercises  Exercise 2: NuStep (seat 9/arms 10) level 3 x5 minutes  Exercise 3: 3-way hip with NON-latex yellow theraband x15 bilateral   Exercise 4:  On blue foam: Heel/toe raise, march, mini squat, hamstring curl x15 bilateral  Exercise 5: Rockerboard 2 directions x20 each; Balance 30 seconds each way  Exercise 6: Balance on Blue foam: step stance bilateral x30 seconds each   Exercise 7: 4 inch step ups forward and lateral  x15 each Bilateral CC  Exercise 9: Hydrostick step stance bilateral forward/back, left/right x10 each way   Exercise 10: Gait at rail in hallway: sidestepping with light Bilateral UE touch to rail x2 laps, backward walking x10 lap with 1 UE at rail - guarded, not pain       Activity Tolerance:  Activity Tolerance: Patient Tolerated treatment well    Assessment: Body structures, Functions, Activity limitations: Decreased functional mobility , Decreased ROM, Decreased ADL status, Decreased strength, Decreased endurance, Decreased sensation, Decreased balance  Assessment: Patient doing well with exercises and able to progress today. Mild antalgia noted with gait but denies pain throughout session. Prognosis: Good  REQUIRES PT FOLLOW UP: Yes  Discharge Recommendations: Continue to assess pending progress    Patient Education:  Patient Education: Patient educated on working on HEP routinely    Plan:  Times per week: 2-3 times per week  Plan weeks: 12 weeks  Specific instructions for Next Treatment: Core and LE stretches and strengthening, ambulation, balance, modalities as needed  Current Treatment Recommendations: Strengthening, ROM, Balance Training, Endurance Training, Stair training, Pain Management, Positioning, Aquatics, Modalities, Gait Training, Manual Therapy - Soft Tissue Mobilization, Transfer Training, Functional Mobility Training, Home Exercise Program  Plan Comment: Continue with current POC    Goals:  Patient goals : \"Walk without assistance\"    Short term goals  Time Frame for Short term goals: 4 weeks  Short term goal 1: Patient able to ambulate with straight cane with good pace, and no gait deviations. Short term goal 2: Decrease right hip pain to 1/10 to assist with sleeping at night. Short term goal 3: Increase right hip strength to 4+/5 to assist with stability on steps. Short term goal 4: Improve balance to allow patient to SLS on right x3 seconds to assist with getting dressed. Long term goals  Time Frame for Long term goals : 12 weeks  Long term goal 1: Independent with HEP and with progression to assist with decreasing pain. Melvin Simpson.  Peyton Guidry, [facility], 8/10/2018

## 2018-08-13 ENCOUNTER — HOSPITAL ENCOUNTER (OUTPATIENT)
Dept: PHYSICAL THERAPY | Age: 63
Setting detail: THERAPIES SERIES
Discharge: HOME | End: 2018-08-13
Payer: COMMERCIAL

## 2018-08-13 PROCEDURE — G8979 MOBILITY GOAL STATUS: HCPCS

## 2018-08-13 PROCEDURE — G8980 MOBILITY D/C STATUS: HCPCS

## 2018-08-13 PROCEDURE — 97110 THERAPEUTIC EXERCISES: CPT

## 2018-08-13 NOTE — PROGRESS NOTES
[place]     Time In: 1244  Time Out: 6800  Minutes: 23  Timed Code Treatment Minutes: 23 Minutes     Date: 2018  Patient Name: Remedios Silveira,  Gender:  female        CSN: [id number]   : 1955  (58 y.o.)  Referral Date : 18    Referring Practitioner: Savannah Garcia MD      Diagnosis: Right Total Hip Replacement  Treatment Diagnosis: Difficulty with ambulation, Right hip pain   Additional Pertinent Hx: High Blood Pressure, Irregular Heart Beat, Asthma, Diabetes, Anxiety Disorder, Short of Breath, Total Hip Precautions                  General:  PT Visit Information  Onset Date: 18  PT Insurance Information: ANTHEM BC/BS -75 VISITS PT/OT/ST COMBINED PER CALENDAR YEAR (2 VISITS USED). Aquatics and modalities are covered. Total # of Visits Approved: 73  Total # of Visits to Date: 8  Progress Note Counter:  for PN               Subjective:  Family / Caregiver Present: No  Comments: Is going to schedule follow up with doctor. Subjective: Patient reports feeling 75% better since beginning therapy. Feels comfortable for discharge. Pain:  Patient Currently in Pain: No         Objective    Other Activities  Other Activities: Re-evaluation performed       Exercises  Exercise 3: HEP printed and patient issued theraband:  3-way hip with NON-latex yellow theraband x15 bilateral   Exercise 4: HEP printed: On blue foam: Heel/toe raise, march, mini squat, hamstring curl x15 bilateral         Activity Tolerance:  Activity Tolerance: Patient Tolerated treatment well    Assessment:  Assessment: Reassessment completed today. Patient very eager to make today her last appointment. Reports is feeling 75% better since starting therapy. Patient does have increased leg strength, less assistance needed with walking, and less pain/soreness. Patient is working on [employer] daily (issued theraband today).   Patient

## 2018-09-10 RX ORDER — AMLODIPINE BESYLATE 5 MG/1
5 TABLET ORAL DAILY
Qty: 30 TABLET | Refills: 3 | Status: SHIPPED | OUTPATIENT
Start: 2018-09-10 | End: 2018-09-20 | Stop reason: SDUPTHER

## 2018-09-10 RX ORDER — AMLODIPINE BESYLATE 5 MG/1
5 TABLET ORAL DAILY
Qty: 90 TABLET | Refills: 3 | Status: SHIPPED | OUTPATIENT
Start: 2018-09-10 | End: 2019-06-06 | Stop reason: SDUPTHER

## 2018-09-18 ENCOUNTER — HOSPITAL ENCOUNTER (OUTPATIENT)
Age: 63
Discharge: HOME | End: 2018-09-18
Payer: COMMERCIAL

## 2018-09-18 LAB
CHOLESTEROL, TOTAL: 130 MG/DL (ref 100–199)
HDLC SERPL-MCNC: 67 MG/DL
LDL CHOLESTEROL CALCULATED: 35 MG/DL
TRIGL SERPL-MCNC: 141 MG/DL (ref 0–199)

## 2018-09-18 PROCEDURE — 80061 LIPID PANEL: CPT

## 2018-09-18 PROCEDURE — 36415 COLL VENOUS BLD VENIPUNCTURE: CPT

## 2018-09-20 ENCOUNTER — OFFICE VISIT (OUTPATIENT)
Dept: CARDIOLOGY CLINIC | Age: 63
End: 2018-09-20

## 2018-09-20 VITALS
HEART RATE: 63 BPM | SYSTOLIC BLOOD PRESSURE: 130 MMHG | BODY MASS INDEX: 25.07 KG/M2 | HEIGHT: 66 IN | DIASTOLIC BLOOD PRESSURE: 80 MMHG | WEIGHT: 156 LBS

## 2018-09-20 DIAGNOSIS — I10 ESSENTIAL HYPERTENSION: ICD-10-CM

## 2018-09-20 DIAGNOSIS — I25.119 CORONARY ARTERY DISEASE INVOLVING NATIVE CORONARY ARTERY OF NATIVE HEART WITH ANGINA PECTORIS (HCC): Primary | ICD-10-CM

## 2018-09-20 DIAGNOSIS — E78.2 MIXED HYPERLIPIDEMIA: ICD-10-CM

## 2018-09-20 DIAGNOSIS — I48.0 PAROXYSMAL ATRIAL FIBRILLATION (HCC): ICD-10-CM

## 2018-09-20 DIAGNOSIS — R06.02 SOB (SHORTNESS OF BREATH): ICD-10-CM

## 2018-09-20 DIAGNOSIS — R42 DIZZINESS: ICD-10-CM

## 2018-09-20 PROCEDURE — 99214 OFFICE O/P EST MOD 30 MIN: CPT | Performed by: INTERNAL MEDICINE

## 2018-09-20 RX ORDER — LOSARTAN POTASSIUM 50 MG/1
50 TABLET ORAL DAILY
Qty: 30 TABLET | Refills: 5 | Status: SHIPPED | OUTPATIENT
Start: 2018-09-20 | End: 2019-02-19 | Stop reason: SDUPTHER

## 2018-09-20 NOTE — PATIENT INSTRUCTIONS
Plan:  1. 30 day cardiac event monitor to assess atrial fibrillation burden. 2. Follow up with Dr. Natalie Gomez for recommendations and management of atrial fibrillation. 3. Decrease  Losartan to 50 mg daily for dizziness. 4. The patient was seen for >25 minutes. >50% of the time was devoted to giving the patient detailed instructions instructions on addressing diet, regular exercise, weight control, smoking abstention, medication compliance, and stress minimization. The patient was provided written and verbal instructions regarding risk factor modification. 5. Establish care with a new primary care physician for regular preventative care and management of non-cardiac medications. 6. Follow up with me in 6 weeks.

## 2018-09-20 NOTE — LETTER
2. Follow up with Dr. Tonny Zurita for recommendations and management of atrial fibrillation. 3. Decrease  Losartan to 50 mg daily for dizziness. 4. The patient was seen for >25 minutes. >50% of the time was devoted to giving the patient detailed instructions instructions on addressing diet, regular exercise, weight control, smoking abstention, medication compliance, and stress minimization. The patient was provided written and verbal instructions regarding risk factor modification. 5. Establish care with a new primary care physician for regular preventative care and management of non-cardiac medications. 6. Follow up with me in 6 weeks. If you have questions, please do not hesitate to call me. I look forward to following Tyesha along with you.     Sincerely,        Elda Mccain MD

## 2018-09-20 NOTE — PROGRESS NOTES
the findings below. EKG:  ECHO:   STRESS TEST:  CATH:  BYPASS:  VASCULAR:    Past Medical History:   has a past medical history of A-fib ([facility] Anemia; CAD (coronary artery disease); Cholelithiasis; Complication of anesthesia; Diabetes mellitus ([facility] GERD (gastroesophageal reflux disease); Hyperlipidemia; Hypertension; MI (myocardial infarction) ([facility] Nausea & vomiting; ARACELIS (obstructive sleep apnea); PONV (postoperative nausea and vomiting); Primary osteoarthritis of right hip; Prolonged emergence from general anesthesia; and Seasonal allergies. Surgical History:   has a past surgical history that includes Coronary angioplasty with stent (1/2009); Rotator cuff repair; Hand surgery; Hysterectomy; Cholecystectomy, laparoscopic (9/7/11); other surgical history (Right, 8.8.15); hip surgery; eye surgery (Bilateral); joint replacement (Right, 06/28/2018); and Hip Arthroplasty (Right, 06/28/2018). Social History:   reports that she quit smoking about 31 years ago. Her smoking use included Cigarettes. She has a 22.50 pack-year smoking history. She has never used smokeless tobacco. She reports that she does not drink alcohol or use drugs. Family History:  No evidence for sudden cardiac death or premature CAD    Medications:  Reviewed and are listed in nursing record. and/or listed below  Outpatient Medications:  Prior to Admission medications    Medication Sig Start Date End Date Taking?  Authorizing Provider   Evolocumab (REPATHA SC) Inject 140 mg into the skin   Yes Historical Provider, MD   amLODIPine (NORVASC) 5 MG tablet Take 1 tablet by mouth daily 9/10/18  Yes Denny Shah MD   apixaban (ELIQUIS) 5 MG TABS tablet Take 1 tablet by mouth 2 times daily 7/18/18  Yes Denny Shah MD   albuterol sulfate  (90 Base) MCG/ACT inhaler Inhale 2 puffs into the lungs every 6 hours as needed for Wheezing   Yes Historical Provider, MD   aspirin EC 81 MG EC tablet Take 1 tablet by mouth daily  Patient taking differently: Take 81 mg by mouth daily Will start after surgery 6/18/18  Yes Safia Man MD   losartan (COZAAR) 100 MG tablet Take 1 tablet by mouth daily 6/18/18  Yes Safia Man MD   furosemide (LASIX) 20 MG tablet Take 1 tablet by mouth 2 times daily  Patient taking differently: Take 20 mg by mouth daily  6/18/18  Yes Safia Man MD   FLUoxetine (PROZAC) 20 MG capsule Take 20 mg by mouth daily   Yes Historical Provider, MD   olopatadine (PATANASE) 0.6 % SOLN nassl soln 2 sprays by Nasal route 2 times daily 8/16/16  Yes Saleem Alonso MD   potassium chloride SA (KLOR-CON M20) 20 MEQ tablet TAKE 1 TABLET DAILY 8/16/16  Yes Saleem Alonso MD   ranolazine (RANEXA) 1000 MG SR tablet Take 1 tablet by mouth 2 times daily  Patient taking differently: Take 500 mg by mouth 2 times daily  4/26/16  Yes Safia Man MD   sotalol (BETAPACE) 80 MG tablet Take 0.5 tablets by mouth 2 times daily 4/25/16  Yes Safia Man MD   nitroGLYCERIN (NITROSTAT) 0.4 MG SL tablet Place 1 tablet under the tongue every 5 minutes as needed for Chest pain 4/25/16  Yes Safia Man MD   omeprazole (PRILOSEC) 40 MG capsule Take 1 capsule by mouth daily 4/12/16  Yes Saleem Alonso MD   montelukast (SINGULAIR) 10 MG tablet TAKE 1 TABLET NIGHTLY  Patient taking differently: Take 10 mg by mouth daily TAKE 1 TABLET NIGHTLY 4/12/16  Yes Saleem Alonso MD   desonide (DESOWEN) 0.05 % cream Apply topically 2 times daily.  4/12/16  Yes Saleem Alonso MD   levothyroxine (SYNTHROID) 25 MCG tablet Take 25 mcg by mouth daily   Yes Historical Provider, MD   metFORMIN ER (GLUCOPHAGE-XR) 500 MG XR tablet Take 500 mg by mouth 2 times daily 1000mg am and  500mg pm   Yes Historical Provider, MD   CRESTOR 40 MG tablet Take 1 tablet by mouth every evening 6/30/15  Yes Saleem Alonso MD   ezetimibe (ZETIA) 10 MG tablet Take 1 tablet by mouth daily 6/30/15  Yes Saleem Alonso MD   Magnesium 400 MG CAPS Take 1 tablet by mouth

## 2018-09-27 RX ORDER — SOTALOL HYDROCHLORIDE 80 MG/1
40 TABLET ORAL 2 TIMES DAILY
Qty: 90 TABLET | Refills: 3 | Status: SHIPPED | OUTPATIENT
Start: 2018-09-27 | End: 2020-09-01

## 2018-10-04 ENCOUNTER — OFFICE VISIT (OUTPATIENT)
Dept: ORTHOPEDIC SURGERY | Age: 63
End: 2018-10-04
Payer: COMMERCIAL

## 2018-10-04 VITALS — HEIGHT: 66 IN | BODY MASS INDEX: 26.36 KG/M2 | WEIGHT: 164 LBS

## 2018-10-04 DIAGNOSIS — Z96.641 STATUS POST TOTAL HIP REPLACEMENT, RIGHT: ICD-10-CM

## 2018-10-04 DIAGNOSIS — M87.051 AVASCULAR NECROSIS OF BONE OF HIP, RIGHT (HCC): Primary | ICD-10-CM

## 2018-10-04 PROCEDURE — 99212 OFFICE O/P EST SF 10 MIN: CPT | Performed by: PHYSICIAN ASSISTANT

## 2018-10-12 ENCOUNTER — TELEPHONE (OUTPATIENT)
Dept: CARDIOLOGY CLINIC | Age: 63
End: 2018-10-12

## 2018-10-19 PROCEDURE — 93228 REMOTE 30 DAY ECG REV/REPORT: CPT | Performed by: INTERNAL MEDICINE

## 2018-10-22 RX ORDER — ASPIRIN 81 MG/1
TABLET, COATED ORAL
Qty: 90 TABLET | Refills: 2 | Status: SHIPPED | OUTPATIENT
Start: 2018-10-22 | End: 2020-12-07

## 2018-10-30 ENCOUNTER — TELEPHONE (OUTPATIENT)
Dept: CARDIOLOGY CLINIC | Age: 63
End: 2018-10-30

## 2018-10-30 DIAGNOSIS — I25.119 CORONARY ARTERY DISEASE INVOLVING NATIVE CORONARY ARTERY OF NATIVE HEART WITH ANGINA PECTORIS (HCC): ICD-10-CM

## 2018-10-30 DIAGNOSIS — R06.02 SOB (SHORTNESS OF BREATH): ICD-10-CM

## 2018-10-30 DIAGNOSIS — E78.2 MIXED HYPERLIPIDEMIA: ICD-10-CM

## 2018-10-30 DIAGNOSIS — I48.0 PAROXYSMAL ATRIAL FIBRILLATION (HCC): ICD-10-CM

## 2018-10-30 DIAGNOSIS — I10 ESSENTIAL HYPERTENSION: ICD-10-CM

## 2018-10-30 DIAGNOSIS — R42 DIZZINESS: ICD-10-CM

## 2018-10-30 NOTE — TELEPHONE ENCOUNTER
Spoke with pt and gave her monitor her results: SR, rare PVCs. Brief idioventricular rhythm. She states that her dizziness and near syncope has resolved. She attributes this to the change in her Losartan.

## 2018-11-02 ENCOUNTER — OFFICE VISIT (OUTPATIENT)
Dept: CARDIOLOGY CLINIC | Age: 63
End: 2018-11-02
Payer: COMMERCIAL

## 2018-11-02 VITALS
HEART RATE: 69 BPM | HEIGHT: 66 IN | BODY MASS INDEX: 25.36 KG/M2 | SYSTOLIC BLOOD PRESSURE: 110 MMHG | WEIGHT: 157.8 LBS | OXYGEN SATURATION: 98 % | DIASTOLIC BLOOD PRESSURE: 70 MMHG

## 2018-11-02 DIAGNOSIS — I49.3 PREMATURE VENTRICULAR BEAT: ICD-10-CM

## 2018-11-02 DIAGNOSIS — I25.119 CORONARY ARTERY DISEASE INVOLVING NATIVE CORONARY ARTERY OF NATIVE HEART WITH ANGINA PECTORIS (HCC): Primary | ICD-10-CM

## 2018-11-02 DIAGNOSIS — I10 ESSENTIAL HYPERTENSION: ICD-10-CM

## 2018-11-02 DIAGNOSIS — E78.2 MIXED HYPERLIPIDEMIA: ICD-10-CM

## 2018-11-02 PROCEDURE — 99214 OFFICE O/P EST MOD 30 MIN: CPT | Performed by: INTERNAL MEDICINE

## 2018-11-02 NOTE — PATIENT INSTRUCTIONS
Plan:  1. Okay to stop Crestor and Zetia as you have. I encourage you to follow up with your Cardiologist as planned in [place]. 2. Follow up with Dr. Latrice Ingram as planned. 3. The patient was seen for >25 minutes. >50% of the time was devoted to giving the patient detailed instructions instructions on addressing diet, regular exercise, weight control, smoking abstention, medication compliance, and stress minimization. The patient was provided written and verbal instructions regarding risk factor modification. 4. Follow up with me as needed.

## 2018-11-02 NOTE — PROGRESS NOTES
[address]  [facility]   Cardiovascular Evaluation    PATIENT: Rock Adair  DATE: 2018  MRN: [id number]  CSN: [id number]  : 1955    Primary Care Doctor: iNlson Kim MD    Reason for evaluation/Chief complaint:   Follow-up (6 wks, ekg 7/3/18, ); Shortness of Breath (on exertion); Palpitations (comes and goes); and Fatigue (comes and goes)       Subjective:    History of present illness on initial date of evaluation:   Rock Adair is a 61 y.o. patient who presents for follow up. Her cardiac event monitor showed sinus rhythm with rare PVCs with a brief idioventricular rhythm. She will be following up with Dr. Darryl Arceo for this. She feels her dizziness has improved with the medication changes made at her last visit. She has stopped her Crestor and Zetia as she thought she did not need these along with the [address] [place] [address]. She was experiencing increased chest pain and self increased her Ranxea to three times daily. Patient Active Problem List   Diagnosis    CAD (coronary artery disease)    HTN (hypertension)    Hyperlipidemia    Presence of stent in coronary artery    DM (diabetes mellitus) ([address]    Anxiety and depression    SOB (shortness of breath)    A-fib (HCC)    Anemia    Acute myocardial infarction ([address]    ARACELIS (obstructive sleep apnea)    Chronic rhinitis    Dizziness    Atherosclerosis of coronary artery    Diabetes mellitus, type 2 ([address]    Hypothyroidism    Closed right hip fracture (HCC)    Chronic ischemic heart disease    Avascular necrosis of bone of hip, right (HCC)    Primary osteoarthritis of right hip    History of total right hip replacement    Premature ventricular beat         Cardiac Testing: I have reviewed the findings below. EKG:  ECHO:   STRESS TEST:  CATH:  BYPASS:  VASCULAR:    Past Medical History:   has a past medical history of A-fib ([address] Anemia; CAD (coronary artery disease); Cholelithiasis; Complication of anesthesia;  Diabetes MD   furosemide (LASIX) 20 MG tablet Take 1 tablet by mouth 2 times daily  Patient taking differently: Take 20 mg by mouth See Admin Instructions  6/18/18  Yes Elbert Stanley MD   FLUoxetine (PROZAC) 20 MG capsule Take 20 mg by mouth daily   Yes Historical Provider, MD   olopatadine (PATANASE) 0.6 % SOLN nassl soln 2 sprays by Nasal route 2 times daily 8/16/16  Yes Ana Anderson MD   potassium chloride SA (KLOR-CON M20) 20 MEQ tablet TAKE 1 TABLET DAILY 8/16/16  Yes Ana Anderson MD   ranolazine (RANEXA) 1000 MG SR tablet Take 1 tablet by mouth 2 times daily  Patient taking differently: Take 500 mg by mouth three times daily  4/26/16  Yes Elbert Stanley MD   omeprazole (PRILOSEC) 40 MG capsule Take 1 capsule by mouth daily 4/12/16  Yes Ana Anderson MD   montelukast (SINGULAIR) 10 MG tablet TAKE 1 TABLET NIGHTLY  Patient taking differently: Take 10 mg by mouth daily TAKE 1 TABLET NIGHTLY 4/12/16  Yes Ana Anderson MD   desonide (DESOWEN) 0.05 % cream Apply topically 2 times daily. 4/12/16  Yes Ana Anderson MD   levothyroxine (SYNTHROID) 25 MCG tablet Take 25 mcg by mouth daily   Yes Historical Provider, MD   metFORMIN ER (GLUCOPHAGE-XR) 500 MG XR tablet Take 500 mg by mouth 2 times daily 1000mg am and  500mg pm   Yes Historical Provider, MD   Magnesium 400 MG CAPS Take 1 tablet by mouth daily. Yes Historical Provider, MD   nitroGLYCERIN (NITROSTAT) 0.4 MG SL tablet Place 1 tablet under the tongue every 5 minutes as needed for Chest pain 4/25/16   Elbert Stanley MD   CRESTOR 40 MG tablet Take 1 tablet by mouth every evening 6/30/15   Ana Anderson MD       In-patient schedule medications:        Infusion Medications: Allergies:  Metformin and related; Macrolides and ketolides; Sulfa antibiotics; Symbicort [budesonide-formoterol fumarate]; Toprol xl [metoprolol]; Biaxin [clarithromycin]; Codeine; Pcn [penicillins];  Vicodin [hydrocodone-acetaminophen]; and Victoza [liraglutide]     Review

## 2018-11-06 ENCOUNTER — OFFICE VISIT (OUTPATIENT)
Dept: CARDIOLOGY CLINIC | Age: 63
End: 2018-11-06
Payer: COMMERCIAL

## 2018-11-06 VITALS
SYSTOLIC BLOOD PRESSURE: 124 MMHG | HEIGHT: 66 IN | DIASTOLIC BLOOD PRESSURE: 84 MMHG | OXYGEN SATURATION: 98 % | WEIGHT: 157 LBS | BODY MASS INDEX: 25.23 KG/M2 | HEART RATE: 60 BPM

## 2018-11-06 DIAGNOSIS — I49.3 PVC (PREMATURE VENTRICULAR CONTRACTION): ICD-10-CM

## 2018-11-06 DIAGNOSIS — I44.2 IDIOVENTRICULAR RHYTHM (HCC): ICD-10-CM

## 2018-11-06 DIAGNOSIS — Z79.899 ENCOUNTER FOR MONITORING SOTALOL THERAPY: Primary | ICD-10-CM

## 2018-11-06 DIAGNOSIS — Z51.81 ENCOUNTER FOR MONITORING SOTALOL THERAPY: Primary | ICD-10-CM

## 2018-11-06 PROCEDURE — 93000 ELECTROCARDIOGRAM COMPLETE: CPT | Performed by: INTERNAL MEDICINE

## 2018-11-06 PROCEDURE — 99214 OFFICE O/P EST MOD 30 MIN: CPT | Performed by: INTERNAL MEDICINE

## 2018-11-06 NOTE — PROGRESS NOTES
[address]   Electrophysiology Consult Note              Date:  November 6, 2018  Patient name: Bryant Barton  YOB: 1955    Reason for Consult:   Atrial Fibrillation    Requesting Physician: Dr. Aubrie Wharton: Bryant Barton is a 61 y.o. female who presents for evaluation of her cardiac event monitor results. She has a PMH of ischemic heart disease with PCI in 2009 and a remaining of  of her RCA. She wore a cardiac event monitor after feeling that her heart rate was fluctuating frequently. This monitor showed sinus rhythm, rare PVCs, and one brief idioventricular rhythm. She has a history of atrial fibrillation which was not seen on the monitor. Her EKG from today shows sinus rhythm rate 60 with normal QTc. She reports she has been feeling well. She travels to [place] frequently and follows up with a Cardiologist there. She states that she \"rests\" often during the day. Her daughter reports she naps frequently. She admits to being diagnosed with ARACELIS and was non-compliant with the recommended appliance. Patient currently denies any weight gain, edema, palpitations, chest pain, shortness of breath, dizziness, and syncope. Past Medical History:   has a past medical history of A-fib ([facility] Anemia; CAD (coronary artery disease); Cholelithiasis; Complication of anesthesia; Diabetes mellitus ([address] GERD (gastroesophageal reflux disease); Hyperlipidemia; Hypertension; MI (myocardial infarction) ([facility] Nausea & vomiting; ARACELIS (obstructive sleep apnea); PONV (postoperative nausea and vomiting); Primary osteoarthritis of right hip; Prolonged emergence from general anesthesia; and Seasonal allergies. Past Surgical History:   has a past surgical history that includes Coronary angioplasty with stent (1/2009); Rotator cuff repair; Hand surgery; Hysterectomy;  Cholecystectomy, laparoscopic (9/7/11); other surgical history (Right, 8.8.15); hip surgery; eye surgery venous pulsation Normal  · The carotid upstroke is normal in amplitude and contour without delay or bruit  · Peripheral pulses are symmetrical and full  Abdomen:  · No masses or tenderness  · Bowel sounds present  Extremities:  ·  No Cyanosis or Clubbing  ·  Lower extremity edema: No  · Skin: Warm and dry  Neurological:  · Alert and oriented. · Moves all extremities well  · No abnormalities of mood, affect, memory, mentation, or behavior are noted      DATA:    ECG:  Sinus rhythm 60  ECHO: 6/2018      Summary   Low normal left ventricle systolic function with an estimated ejection   fraction of 50%. No regional wall motion abnormalities are seen. Normal left ventricular diastolic filling pressure. In comparison to the previous echo done 7/2013, no significant changes. LV Diastolic Dimension: 8.11 cm LV Systolic Dimension: 4.07 cm   LV Septum Diastolic: 1.46 cm   LV PW Diastolic: 0.9 cm         AO Root Dimension: 3.1 cm                                   AV Cusp Separation: 2.1 cm                                   LA Dimension: 3.1 cm                                   LA Area: 15.9 cm2                                   LA volume/Index: 41.33 ml /22 ml/m2      IMPRESSION:    1. Encounter for monitoring sotalol therapy    2. PVC (premature ventricular contraction)    3. Idioventricular rhythm (HCC)          RECOMMENDATIONS:  1. Recommend follow up for your sleep apnea as this is a contributing factor of the atrial arrhythmias that you are having. Referral to Dr. Sebastian Larson. 2. Follow up with your Cardiologist as planned in [place]. 3. Will give order to check Magnesium. Bring copy of your results from [place] with you when you return. 4. Follow up with Deepa Pereira CNP in 6 months. Discussed implantable loop recorder recommendation made by physician in [facility]  1. Tobacco Cessation Counseling: NA  2. Retake of BP if >140/90:   NA  3.  Documentation to PCP/referring for new patient: Sent to PCP at close of office visit  4. CAD patient on anti-platelet: Yes  5. CAD patient on STATIN therapy:  No, intolerant. On Repatha   6. Patient with aFib on anticoagulation: This note was scribed in the presence of Dr. Luis A Rust by Gisela Valentin RN.       I, Claire Camara, personally performed the services described in this documentation, as scribed by the above signed scribe in my presence. It is both accurate and complete to my knowledge. I agree with the details independently gathered by the clinical support staff, while the remaining scribed note accurately describes my personal service to the patient.       All questions and concerns were addressed to the patient/family. Alternatives to my treatment were discussed. ROBERTH Cutler F.A.C.C. Electrophysiology  [facility]. [address] [place] [address]. [address].   [address] [id number]  Phone: [phone]  Fax: [phone]

## 2018-11-06 NOTE — LETTER
[address] [place] [address] Cardiology - [address] [place] [address] [address] [address]  Phone: [phone]  Fax: [phone]    Tej Wan MD        November 12, 2018     Kait Calderon MD  No address on file    Patient: Bryant Barton  MR Number: [id number]  YOB: 1955  Date of Visit: 11/6/2018    Dear Dr. Kait Calderon:  I recently saw our mutual patient, listed above. . Below are the relevant portions of my assessment and plan of care. [address]   Electrophysiology Consult Note              Date:  November 6, 2018  Patient name: Bryant Barton  YOB: 1955    Reason for Consult:   Atrial Fibrillation    Requesting Physician: Dr. Aubrie Wharton: Bryant Barton is a 61 y.o. female who presents for evaluation of her cardiac event monitor results. She has a PMH of ischemic heart disease with PCI in 2009 and a remaining of  of her RCA. She wore a cardiac event monitor after feeling that her heart rate was fluctuating frequently. This monitor showed sinus rhythm, rare PVCs, and one brief idioventricular rhythm. She has a history of atrial fibrillation which was not seen on the monitor. Her EKG from today shows sinus rhythm rate 60 with normal QTc. She reports she has been feeling well. She travels to [place] frequently and follows up with a Cardiologist there. She states that she \"rests\" often during the day. Her daughter reports she naps frequently. She admits to being diagnosed with ARACELIS and was non-compliant with the recommended appliance. Patient currently denies any weight gain, edema, palpitations, chest pain, shortness of breath, dizziness, and syncope. Past Medical History:   has a past medical history of A-fib ([facility] Anemia; CAD (coronary artery disease); Cholelithiasis; Complication of anesthesia;  Diabetes mellitus ([facility] GERD (gastroesophageal reflux disease);

## 2018-11-19 ENCOUNTER — TELEPHONE (OUTPATIENT)
Dept: CARDIOLOGY CLINIC | Age: 63
End: 2018-11-19

## 2018-11-20 NOTE — TELEPHONE ENCOUNTER
Spoke with Dr. Shoemaker Comes office. The current referral in the chart is sufficient. They have reached out to the patient to schedule and the patient asked for them to not call her, that she would call to schedule. I called the patient to discuss. I informed her that the referral was okay and to call [phone] to make an appointment with Dr. Rick Mtz. She stated that she would do so.

## 2018-12-04 ENCOUNTER — TELEPHONE (OUTPATIENT)
Dept: PULMONOLOGY | Age: 63
End: 2018-12-04

## 2018-12-04 ENCOUNTER — OFFICE VISIT (OUTPATIENT)
Dept: PULMONOLOGY | Age: 63
End: 2018-12-04
Payer: COMMERCIAL

## 2018-12-04 VITALS
RESPIRATION RATE: 15 BRPM | HEART RATE: 73 BPM | OXYGEN SATURATION: 98 % | WEIGHT: 156 LBS | DIASTOLIC BLOOD PRESSURE: 70 MMHG | BODY MASS INDEX: 24.48 KG/M2 | SYSTOLIC BLOOD PRESSURE: 122 MMHG | TEMPERATURE: 98.1 F | HEIGHT: 67 IN

## 2018-12-04 DIAGNOSIS — G47.10 HYPERSOMNIA: ICD-10-CM

## 2018-12-04 DIAGNOSIS — G47.34 NOCTURNAL HYPOXIA: ICD-10-CM

## 2018-12-04 DIAGNOSIS — G47.33 SEVERE OBSTRUCTIVE SLEEP APNEA: Primary | ICD-10-CM

## 2018-12-04 DIAGNOSIS — G47.30 OBSERVED SLEEP APNEA: ICD-10-CM

## 2018-12-04 DIAGNOSIS — G47.33 OSA (OBSTRUCTIVE SLEEP APNEA): Primary | ICD-10-CM

## 2018-12-04 PROCEDURE — 99243 OFF/OP CNSLTJ NEW/EST LOW 30: CPT | Performed by: INTERNAL MEDICINE

## 2018-12-04 RX ORDER — ROSUVASTATIN CALCIUM 40 MG/1
40 TABLET, COATED ORAL EVERY EVENING
COMMUNITY
End: 2020-01-27

## 2018-12-04 ASSESSMENT — SLEEP AND FATIGUE QUESTIONNAIRES
HOW LIKELY ARE YOU TO NOD OFF OR FALL ASLEEP WHILE SITTING AND READING: 3
HOW LIKELY ARE YOU TO NOD OFF OR FALL ASLEEP WHILE SITTING QUIETLY AFTER LUNCH WITHOUT ALCOHOL: 2
HOW LIKELY ARE YOU TO NOD OFF OR FALL ASLEEP WHILE WATCHING TV: 2
HOW LIKELY ARE YOU TO NOD OFF OR FALL ASLEEP WHEN YOU ARE A PASSENGER IN A CAR FOR AN HOUR WITHOUT A BREAK: 2
ESS TOTAL SCORE: 17
NECK CIRCUMFERENCE (INCHES): 15.5
HOW LIKELY ARE YOU TO NOD OFF OR FALL ASLEEP WHILE LYING DOWN TO REST IN THE AFTERNOON WHEN CIRCUMSTANCES PERMIT: 3
HOW LIKELY ARE YOU TO NOD OFF OR FALL ASLEEP WHILE SITTING AND TALKING TO SOMEONE: 1
HOW LIKELY ARE YOU TO NOD OFF OR FALL ASLEEP IN A CAR, WHILE STOPPED FOR A FEW MINUTES IN TRAFFIC: 1
HOW LIKELY ARE YOU TO NOD OFF OR FALL ASLEEP WHILE SITTING INACTIVE IN A PUBLIC PLACE: 3

## 2018-12-04 NOTE — PROGRESS NOTES
[facility] Pulmonary, Critical Care and Sleep Specialists                                                                  CHIEF COMPLAINT: ARACELIS      Consulting provider: Dr. Vicki Perez     HPI:   Diagnosed with ARACELIS in 2014. Old records were reviewed by me and noted below. Tried CPAP for about 6-8 months using nasal pillow. Patient is claustrophobic and could not use- she really tried her best efforts she said. Snoring at night for the past >5 years. The severity of snoring is severe. Not sure what makes better or worse. Has observed sleep apnea. Patient is complaining of daytime sleepiness. Fatigue and tiredness during the day. Bedtime 11 pm and rise time is 8 am. Sleep onset few minutes. once nocturia. ESS is 17.          Past Medical History:   Diagnosis Date    A-fib ([facility] [address]     Anemia     CAD (coronary artery disease) 2009    Cholelithiasis 8/5363    Complication of anesthesia     Long time to wake up    Diabetes mellitus ([facility] [address]     GERD (gastroesophageal reflux disease)     Hyperlipidemia     Hypertension     MI (myocardial infarction) ([facility] [address] 2004    Nausea & vomiting     ARACELIS (obstructive sleep apnea)     does not use CPAP  (has had some recent weight loss)    PONV (postoperative nausea and vomiting)     Primary osteoarthritis of right hip 6/7/2018    Prolonged emergence from general anesthesia     Seasonal allergies        Past Surgical History:        Procedure Laterality Date    CHOLECYSTECTOMY, LAPAROSCOPIC  9/7/11    laproscopic cholecystectomy with intraoperative cholaniogram    CORONARY ANGIOPLASTY WITH STENT PLACEMENT  1/2009    EYE SURGERY Bilateral     cataract    HAND SURGERY      [employer]eeper Thumb Surgery    HIP ARTHROPLASTY Right 06/28/2018    HIP SURGERY      pin    HYSTERECTOMY      JOINT REPLACEMENT Right 06/28/2018    RTH    OTHER SURGICAL HISTORY Right 8.8.15    right hip pinning    ROTATOR CUFF REPAIR      Right       Allergies:  is allergic to 1    ranolazine (RANEXA) 1000 MG SR tablet, Take 1 tablet by mouth 2 times daily (Patient taking differently: Take 500 mg by mouth three times daily ), Disp: 180 tablet, Rfl: 3    nitroGLYCERIN (NITROSTAT) 0.4 MG SL tablet, Place 1 tablet under the tongue every 5 minutes as needed for Chest pain, Disp: 90 tablet, Rfl: 3    omeprazole (PRILOSEC) 40 MG capsule, Take 1 capsule by mouth daily, Disp: 90 capsule, Rfl: 1    montelukast (SINGULAIR) 10 MG tablet, TAKE 1 TABLET NIGHTLY (Patient taking differently: Take 10 mg by mouth daily TAKE 1 TABLET NIGHTLY), Disp: 90 tablet, Rfl: 1    desonide (DESOWEN) 0.05 % cream, Apply topically 2 times daily. , Disp: 1 Tube, Rfl: 1    levothyroxine (SYNTHROID) 25 MCG tablet, Take 25 mcg by mouth daily, Disp: , Rfl:     metFORMIN ER (GLUCOPHAGE-XR) 500 MG XR tablet, Take 500 mg by mouth 2 times daily 1000mg am and  500mg pm, Disp: , Rfl:     Magnesium 400 MG CAPS, Take 1 tablet by mouth daily. , Disp: , Rfl:       REVIEW OF SYSTEMS:  Constitutional: Negative for fever  HENT: Negative for sore throat  Eyes: Negative for redness   Respiratory: Negative for dyspnea, cough  Cardiovascular: Negative for chest pain  Gastrointestinal: Negative for vomiting, diarrhea   Genitourinary: Negative for hematuria   Musculoskeletal: Negative for arthralgias   Skin: Negative for rash  Neurological: Negative for syncope  Hematological: Negative for adenopathy  Psychiatric/Behavorial: Negative for anxiety      Objective:   PHYSICAL EXAM:    Blood pressure 122/70, pulse 73, temperature 98.1 °F (36.7 °C), temperature source Oral, resp. rate 15, height 5' 6.5\" (1.689 m), weight 156 lb (70.8 kg), SpO2 98 %, not currently breastfeeding.' on RA  Gen: No distress. BMI of 24.80  Eyes: PERRL. No sclera icterus. No conjunctival injection. ENT: No discharge. Pharynx clear. Mallampati class IV. Neck: Trachea midline. No obvious mass. Neck circumference 15.5\"  Resp: No accessory muscle use. No crackles.

## 2018-12-04 NOTE — LETTER
[facility] Pulmonary, Critical Care, and Sleep  [address], [address] [place] [address]  Phone: [phone]  Fax: [phone]     December 4, 2018     Patient: Edgar Zhang   MR Number: [id number]   YOB: 1955   Date of Visit: 12/4/2018     Dear Dr. Aracelis Daniels: Thank you for the request for consultation for Maya Mcburney to me for the evaluation. Below are the relevant portions of my assessment and plan of care. Assessment:       · Severe ARACELIS. Failed CPAP and refusing retrial   · Snoring, Observed sleep apnea and Hypersomnia   · PVC  · Idioventricular rhythm   · Afib on Sotalol         Plan:       Discussed with patient alterturnatives treatment options for her ARACELIS, including oral appliances, surgery and hypoglossal nerve stimulation. Patient prefers no therapy. Complications of ARACELIS if not treated were reviewed with patient and included systemic hypertension, pulmonary hypertension, cardiovascular morbidities, car accidents and all cause mortality. Willing to try O2. Will order overnight pulse ox on RA. Patient verbalized understanding that O2 therapy is not a replacement for CPAP. Discussed with patient the pathophysiology of apnea. Sleep hygiene  Avoid sedatives, alcohol and caffeinated drinks at bed time. No driving motorized vehicles or operating heavy machinery while fatigue, drowsy or sleepy. If you have questions, please do not hesitate to call me. I look forward to following Tyesha along with you.     Sincerely,        Josue Ha MD

## 2018-12-11 RX ORDER — RANOLAZINE 500 MG/1
500 TABLET, EXTENDED RELEASE ORAL 2 TIMES DAILY
Qty: 180 TABLET | Refills: 2 | Status: SHIPPED | OUTPATIENT
Start: 2018-12-11 | End: 2019-12-18

## 2018-12-11 RX ORDER — RANOLAZINE 500 MG/1
500 TABLET, EXTENDED RELEASE ORAL 2 TIMES DAILY
Qty: 20 TABLET | Refills: 0 | Status: SHIPPED | OUTPATIENT
Start: 2018-12-11 | End: 2019-12-18

## 2018-12-11 RX ORDER — RANOLAZINE 500 MG/1
500 TABLET, EXTENDED RELEASE ORAL 2 TIMES DAILY
Qty: 20 TABLET | Refills: 0 | Status: CANCELLED | OUTPATIENT
Start: 2018-12-11

## 2018-12-20 DIAGNOSIS — G47.34 NOCTURNAL HYPOXEMIA: Primary | ICD-10-CM

## 2019-02-19 DIAGNOSIS — I10 ESSENTIAL HYPERTENSION: ICD-10-CM

## 2019-02-19 RX ORDER — LOSARTAN POTASSIUM 50 MG/1
50 TABLET ORAL DAILY
Qty: 30 TABLET | Refills: 5 | Status: SHIPPED | OUTPATIENT
Start: 2019-02-19

## 2019-06-07 RX ORDER — AMLODIPINE BESYLATE 5 MG/1
TABLET ORAL
Qty: 90 TABLET | Refills: 2 | Status: SHIPPED | OUTPATIENT
Start: 2019-06-07 | End: 2020-01-27

## 2019-11-19 ENCOUNTER — TELEPHONE (OUTPATIENT)
Dept: PULMONOLOGY | Age: 64
End: 2019-11-19

## 2019-11-21 ENCOUNTER — OFFICE VISIT (OUTPATIENT)
Dept: PULMONOLOGY | Age: 64
End: 2019-11-21
Payer: COMMERCIAL

## 2019-11-21 VITALS
BODY MASS INDEX: 26.03 KG/M2 | RESPIRATION RATE: 16 BRPM | WEIGHT: 162 LBS | DIASTOLIC BLOOD PRESSURE: 76 MMHG | OXYGEN SATURATION: 97 % | HEART RATE: 68 BPM | SYSTOLIC BLOOD PRESSURE: 124 MMHG | HEIGHT: 66 IN

## 2019-11-21 DIAGNOSIS — G47.33 SEVERE OBSTRUCTIVE SLEEP APNEA: Primary | ICD-10-CM

## 2019-11-21 PROCEDURE — 99213 OFFICE O/P EST LOW 20 MIN: CPT | Performed by: INTERNAL MEDICINE

## 2019-11-21 ASSESSMENT — SLEEP AND FATIGUE QUESTIONNAIRES
ESS TOTAL SCORE: 16
HOW LIKELY ARE YOU TO NOD OFF OR FALL ASLEEP IN A CAR, WHILE STOPPED FOR A FEW MINUTES IN TRAFFIC: 0
NECK CIRCUMFERENCE (INCHES): 15
HOW LIKELY ARE YOU TO NOD OFF OR FALL ASLEEP WHILE SITTING INACTIVE IN A PUBLIC PLACE: 1
HOW LIKELY ARE YOU TO NOD OFF OR FALL ASLEEP WHILE SITTING QUIETLY AFTER LUNCH WITHOUT ALCOHOL: 3
HOW LIKELY ARE YOU TO NOD OFF OR FALL ASLEEP WHILE SITTING AND TALKING TO SOMEONE: 2
HOW LIKELY ARE YOU TO NOD OFF OR FALL ASLEEP WHEN YOU ARE A PASSENGER IN A CAR FOR AN HOUR WITHOUT A BREAK: 3
HOW LIKELY ARE YOU TO NOD OFF OR FALL ASLEEP WHILE WATCHING TV: 2
HOW LIKELY ARE YOU TO NOD OFF OR FALL ASLEEP WHILE SITTING AND READING: 3
HOW LIKELY ARE YOU TO NOD OFF OR FALL ASLEEP WHILE LYING DOWN TO REST IN THE AFTERNOON WHEN CIRCUMSTANCES PERMIT: 2

## 2019-12-09 ENCOUNTER — OFFICE VISIT (OUTPATIENT)
Dept: ORTHOPEDIC SURGERY | Age: 64
End: 2019-12-09
Payer: COMMERCIAL

## 2019-12-09 VITALS — HEIGHT: 66 IN | BODY MASS INDEX: 26.04 KG/M2 | WEIGHT: 162.04 LBS

## 2019-12-09 DIAGNOSIS — Z96.641 HISTORY OF TOTAL RIGHT HIP REPLACEMENT: ICD-10-CM

## 2019-12-09 DIAGNOSIS — M25.551 RIGHT HIP PAIN: Primary | ICD-10-CM

## 2019-12-09 PROCEDURE — 99212 OFFICE O/P EST SF 10 MIN: CPT | Performed by: PHYSICIAN ASSISTANT

## 2019-12-18 ENCOUNTER — OFFICE VISIT (OUTPATIENT)
Dept: CARDIOLOGY CLINIC | Age: 64
End: 2019-12-18
Payer: COMMERCIAL

## 2019-12-18 VITALS
SYSTOLIC BLOOD PRESSURE: 116 MMHG | HEART RATE: 64 BPM | DIASTOLIC BLOOD PRESSURE: 76 MMHG | HEIGHT: 65 IN | BODY MASS INDEX: 27.16 KG/M2 | WEIGHT: 163 LBS

## 2019-12-18 DIAGNOSIS — I25.119 CORONARY ARTERY DISEASE INVOLVING NATIVE CORONARY ARTERY OF NATIVE HEART WITH ANGINA PECTORIS (HCC): ICD-10-CM

## 2019-12-18 DIAGNOSIS — I48.91 ATRIAL FIBRILLATION, UNSPECIFIED TYPE (HCC): Primary | ICD-10-CM

## 2019-12-18 DIAGNOSIS — I10 ESSENTIAL HYPERTENSION: ICD-10-CM

## 2019-12-18 PROCEDURE — 99214 OFFICE O/P EST MOD 30 MIN: CPT | Performed by: INTERNAL MEDICINE

## 2019-12-18 PROCEDURE — 93000 ELECTROCARDIOGRAM COMPLETE: CPT | Performed by: INTERNAL MEDICINE

## 2019-12-19 ENCOUNTER — TELEPHONE (OUTPATIENT)
Dept: CARDIOLOGY CLINIC | Age: 64
End: 2019-12-19

## 2019-12-19 NOTE — TELEPHONE ENCOUNTER
Patient was seen yesterday. Patient is scheduled with Dr. Loan Bone for Loop Implant on 1/13/20 at 9:30am [facility], arrival time of 8am to the Cath Lab. Please have patient arrive to the main entrance of [facility] at 7:45am and check in with the registration desk. Medications reviewed in office with RADHA Garcia/Smita. Remind patient to be NPO after midnight (8 hours prior). Do not apply lotions/creams on skin the day of procedure.

## 2020-01-13 ENCOUNTER — HOSPITAL ENCOUNTER (OUTPATIENT)
Dept: CARDIAC CATH/INVASIVE PROCEDURES | Age: 65
Discharge: HOME | End: 2020-01-13
Attending: INTERNAL MEDICINE | Admitting: INTERNAL MEDICINE
Payer: COMMERCIAL

## 2020-01-13 VITALS — HEIGHT: 65 IN | WEIGHT: 165 LBS | BODY MASS INDEX: 27.49 KG/M2

## 2020-01-13 PROBLEM — Z45.09 ENCOUNTER FOR LOOP RECORDER CHECK: Status: ACTIVE | Noted: 2020-01-13

## 2020-01-13 LAB
ANION GAP SERPL CALCULATED.3IONS-SCNC: 11 MMOL/L (ref 3–16)
BUN BLDV-MCNC: 16 MG/DL (ref 7–20)
CALCIUM SERPL-MCNC: 9.7 MG/DL (ref 8.3–10.6)
CHLORIDE BLD-SCNC: 100 MMOL/L (ref 99–110)
CO2: 26 MMOL/L (ref 21–32)
CREAT SERPL-MCNC: 0.7 MG/DL (ref 0.6–1.2)
EKG ATRIAL RATE: 56 BPM
EKG DIAGNOSIS: NORMAL
EKG P AXIS: 50 DEGREES
EKG P-R INTERVAL: 204 MS
EKG Q-T INTERVAL: 452 MS
EKG QRS DURATION: 88 MS
EKG QTC CALCULATION (BAZETT): 436 MS
EKG R AXIS: -2 DEGREES
EKG T AXIS: -6 DEGREES
EKG VENTRICULAR RATE: 56 BPM
GFR AFRICAN AMERICAN: >60
GFR NON-AFRICAN AMERICAN: >60
GLUCOSE BLD-MCNC: 158 MG/DL (ref 70–99)
HCT VFR BLD CALC: 39.6 % (ref 36–48)
HEMOGLOBIN: 13.2 G/DL (ref 12–16)
INR BLD: 1.19 (ref 0.86–1.14)
MCH RBC QN AUTO: 30.3 PG (ref 26–34)
MCHC RBC AUTO-ENTMCNC: 33.3 G/DL (ref 31–36)
MCV RBC AUTO: 90.9 FL (ref 80–100)
PDW BLD-RTO: 13.8 % (ref 12.4–15.4)
PLATELET # BLD: 303 K/UL (ref 135–450)
PMV BLD AUTO: 8 FL (ref 5–10.5)
POTASSIUM SERPL-SCNC: 4.2 MMOL/L (ref 3.5–5.1)
PROTHROMBIN TIME: 13.8 SEC (ref 10–13.2)
RBC # BLD: 4.35 M/UL (ref 4–5.2)
SODIUM BLD-SCNC: 137 MMOL/L (ref 136–145)
WBC # BLD: 8.5 K/UL (ref 4–11)

## 2020-01-13 PROCEDURE — 93005 ELECTROCARDIOGRAM TRACING: CPT | Performed by: INTERNAL MEDICINE

## 2020-01-13 PROCEDURE — 33285 INSJ SUBQ CAR RHYTHM MNTR: CPT

## 2020-01-13 PROCEDURE — 80048 BASIC METABOLIC PNL TOTAL CA: CPT

## 2020-01-13 PROCEDURE — 2500000003 HC RX 250 WO HCPCS

## 2020-01-13 PROCEDURE — 33285 INSJ SUBQ CAR RHYTHM MNTR: CPT | Performed by: INTERNAL MEDICINE

## 2020-01-13 PROCEDURE — 93010 ELECTROCARDIOGRAM REPORT: CPT | Performed by: INTERNAL MEDICINE

## 2020-01-13 PROCEDURE — 85610 PROTHROMBIN TIME: CPT

## 2020-01-13 PROCEDURE — 6360000002 HC RX W HCPCS

## 2020-01-13 PROCEDURE — C1764 EVENT RECORDER, CARDIAC: HCPCS

## 2020-01-13 PROCEDURE — 2580000003 HC RX 258

## 2020-01-13 PROCEDURE — 85027 COMPLETE CBC AUTOMATED: CPT

## 2020-01-13 RX ORDER — SODIUM CHLORIDE 9 MG/ML
1000 INJECTION, SOLUTION INTRAVENOUS CONTINUOUS
Status: DISCONTINUED | OUTPATIENT
Start: 2020-01-13 | End: 2020-01-13 | Stop reason: HOSPADM

## 2020-01-24 NOTE — PROGRESS NOTES
no drainage or sinus tenderness   Throat: Lips, mucosa, and tongue normal   Neck: Supple, symmetrical, trachea midline, no adenopathy, thyroid: not enlarged, symmetric, no tenderness/mass/nodules, no carotid bruit or JVD       Lungs:   Clear to auscultation bilaterally, respirations unlabored   Chest Wall:  No tenderness or deformity   Heart:  Regular rhythm and normal rate; S1, S2 are normal; no murmur noted; no rub or gallop   Abdomen:   Soft, non-tender, bowel sounds active all four quadrants,  no masses, no organomegaly           Extremities: Extremities normal, atraumatic, no cyanosis or edema   Pulses: 2+ and symmetric   Skin: Skin color, texture, turgor normal, no rashes or lesions   Pysch: Normal mood and affect   Neurologic: Normal gross motor and sensory exam.         Labs  No results for input(s): WBC, HGB, HCT, MCV, PLT in the last 72 hours. No results for input(s): CREATININE, BUN, NA, K, CL, CO2 in the last 72 hours. No results for input(s): INR, PROTIME in the last 72 hours. No results for input(s): TROPONINI in the last 72 hours. Invalid input(s): PRO-BNP  No results for input(s): CHOL, HDL in the last 72 hours. Invalid input(s): LDL, TG      Imaging:  I have reviewed the below testing personally and my interpretation is below. EKG:  CXR:      Assessment:  59 y.o. patient with:  1. Angina/exertional shortness of breath               ~unchanged  2. Ischemic heart disease with    ~CIRC JACK 2009   ~ of the RCA noted in 2003   ~Normal LVEF 57% by MUGA 01/13  3. Anxiety   4. Depression  5. Chronic stable angina  6. Hypertension    ~BP: (124)/(82)    7. Hyperlipidemia  8. Paroxysmal atrial fibrillation   ~CHADs 4    ~Eliquis     Plan:  1. Stop Isosorbide due to polypharmacy and dizziness. 2. Obtain films from your angiogram done in [place]. After reviewing this, we can decide if a procedure can be done to attempt to open your chronic total occlusion.    3. Follow up with Alvaro Book CNP as

## 2020-01-27 ENCOUNTER — OFFICE VISIT (OUTPATIENT)
Dept: CARDIOLOGY CLINIC | Age: 65
End: 2020-01-27
Payer: COMMERCIAL

## 2020-01-27 ENCOUNTER — NURSE ONLY (OUTPATIENT)
Dept: CARDIOLOGY CLINIC | Age: 65
End: 2020-01-27
Payer: COMMERCIAL

## 2020-01-27 VITALS
BODY MASS INDEX: 27.39 KG/M2 | SYSTOLIC BLOOD PRESSURE: 124 MMHG | HEIGHT: 65 IN | WEIGHT: 164.4 LBS | DIASTOLIC BLOOD PRESSURE: 82 MMHG | OXYGEN SATURATION: 96 % | HEART RATE: 65 BPM

## 2020-01-27 PROCEDURE — 93291 INTERROG DEV EVAL SCRMS IP: CPT | Performed by: INTERNAL MEDICINE

## 2020-01-27 PROCEDURE — 99214 OFFICE O/P EST MOD 30 MIN: CPT | Performed by: INTERNAL MEDICINE

## 2020-01-27 RX ORDER — RANOLAZINE 500 MG/1
500 TABLET, EXTENDED RELEASE ORAL 2 TIMES DAILY
Qty: 180 TABLET | Refills: 3 | Status: SHIPPED | OUTPATIENT
Start: 2020-01-27 | End: 2020-01-29 | Stop reason: SDUPTHER

## 2020-01-27 NOTE — PATIENT INSTRUCTIONS
Plan:  1. Stop Isosorbide due to polypharmacy and dizziness. 2. Obtain films from your angiogram done in [place]. After reviewing this, we can decide if a procedure can be done to attempt to open your chronic total occlusion. 3. Follow up with Edin Henry CNP as planned to discuss the use of Sotalol. 4. Start Ranexa 500 mg twice daily. 5. Follow up with me in the Summer when you return from [place].

## 2020-01-27 NOTE — PROGRESS NOTES
Patient comes in for interrogation of their implanted loop recorder. Interrogation shows 6 symptom recordings which show NSR with PACs and PVCs. Event on 1/22 at 0933 shows slight dip in heart rate into the 50s. Patients incision is healing nicely. Patient will see Dr. Mian Gomez today in office. We will follow the patient remotely.

## 2020-01-28 ENCOUNTER — TELEPHONE (OUTPATIENT)
Dept: CARDIOLOGY CLINIC | Age: 65
End: 2020-01-28

## 2020-01-28 NOTE — TELEPHONE ENCOUNTER
----- Message from Brendon Mercado MD sent at 1/27/2020  2:37 PM EST -----  Jan diaz Alysa marks per RPS  ----- Message -----  From: Wing Carolina MD  Sent: 1/27/2020  12:29 PM EST  To: Brendon Mercado MD    She can go to [place]. I have no objection. RP  ----- Message -----  From: Brendon Mercado MD  Sent: 1/27/2020  10:52 AM EST  To: MARIA R Prince - CNP, #    Patient saw me this AM and wants to go to [place] ASA. Do you need to see her prior? Has appointment with Cora Summers end of the month. She had a loop few days back and saw me in office today. Site looks fine and she is feeling decent except for her dizziness (reason for loop). I will leave loop interrogation from today on your desk.     Lizy Sylvester

## 2020-01-29 RX ORDER — RANOLAZINE 500 MG/1
500 TABLET, EXTENDED RELEASE ORAL 2 TIMES DAILY
Qty: 20 TABLET | Refills: 0 | Status: SHIPPED | OUTPATIENT
Start: 2020-01-29 | End: 2021-08-18 | Stop reason: ALTCHOICE

## 2020-02-19 ENCOUNTER — TELEPHONE (OUTPATIENT)
Dept: CARDIOLOGY CLINIC | Age: 65
End: 2020-02-19

## 2020-02-27 ENCOUNTER — NURSE ONLY (OUTPATIENT)
Dept: CARDIOLOGY CLINIC | Age: 65
End: 2020-02-27
Payer: COMMERCIAL

## 2020-02-27 PROCEDURE — 93298 REM INTERROG DEV EVAL SCRMS: CPT | Performed by: INTERNAL MEDICINE

## 2020-02-27 PROCEDURE — G2066 INTER DEVC REMOTE 30D: HCPCS | Performed by: INTERNAL MEDICINE

## 2020-02-27 NOTE — PROGRESS NOTES
Remote interrogation of implanted cardiac event monitor shows normal function. ILR to monitor for AF. (hx pAF-oac). No AF recordrd to date. Presenting rhythm shows sinus w/ pac's and a pvc. No symptom episodes recorded. Follow up 1 month via carelink.

## 2020-03-30 ENCOUNTER — NURSE ONLY (OUTPATIENT)
Dept: CARDIOLOGY CLINIC | Age: 65
End: 2020-03-30
Payer: COMMERCIAL

## 2020-03-30 PROCEDURE — G2066 INTER DEVC REMOTE 30D: HCPCS | Performed by: INTERNAL MEDICINE

## 2020-03-30 PROCEDURE — 93298 REM INTERROG DEV EVAL SCRMS: CPT | Performed by: INTERNAL MEDICINE

## 2020-04-30 ENCOUNTER — NURSE ONLY (OUTPATIENT)
Dept: CARDIOLOGY CLINIC | Age: 65
End: 2020-04-30
Payer: COMMERCIAL

## 2020-04-30 PROCEDURE — 93298 REM INTERROG DEV EVAL SCRMS: CPT | Performed by: INTERNAL MEDICINE

## 2020-04-30 PROCEDURE — G2066 INTER DEVC REMOTE 30D: HCPCS | Performed by: INTERNAL MEDICINE

## 2020-06-01 ENCOUNTER — NURSE ONLY (OUTPATIENT)
Dept: CARDIOLOGY CLINIC | Age: 65
End: 2020-06-01
Payer: COMMERCIAL

## 2020-06-01 PROCEDURE — G2066 INTER DEVC REMOTE 30D: HCPCS | Performed by: INTERNAL MEDICINE

## 2020-06-01 PROCEDURE — 93298 REM INTERROG DEV EVAL SCRMS: CPT | Performed by: INTERNAL MEDICINE

## 2020-06-17 ENCOUNTER — OFFICE VISIT (OUTPATIENT)
Dept: CARDIOLOGY CLINIC | Age: 65
End: 2020-06-17
Payer: COMMERCIAL

## 2020-06-17 VITALS
HEIGHT: 65 IN | HEART RATE: 67 BPM | SYSTOLIC BLOOD PRESSURE: 130 MMHG | BODY MASS INDEX: 25.83 KG/M2 | DIASTOLIC BLOOD PRESSURE: 76 MMHG | WEIGHT: 155 LBS | OXYGEN SATURATION: 96 %

## 2020-06-17 PROCEDURE — 99214 OFFICE O/P EST MOD 30 MIN: CPT | Performed by: INTERNAL MEDICINE

## 2020-06-17 RX ORDER — SITAGLIPTIN AND METFORMIN HYDROCHLORIDE 1000; 50 MG/1; MG/1
1 TABLET, FILM COATED ORAL 2 TIMES DAILY WITH MEALS
Qty: 180 TABLET | Refills: 0 | Status: SHIPPED | OUTPATIENT
Start: 2020-06-17 | End: 2020-07-23

## 2020-06-17 RX ORDER — ROSUVASTATIN CALCIUM 40 MG/1
20 TABLET, COATED ORAL EVERY EVENING
COMMUNITY

## 2020-06-17 RX ORDER — SITAGLIPTIN AND METFORMIN HYDROCHLORIDE 100; 1000 MG/1; MG/1
1 TABLET, FILM COATED, EXTENDED RELEASE ORAL DAILY
Qty: 90 TABLET | Refills: 1 | Status: CANCELLED | OUTPATIENT
Start: 2020-06-17

## 2020-06-17 NOTE — PROGRESS NOTES
Start Date End Date Taking? Authorizing Provider   rosuvastatin (CRESTOR) 40 MG tablet Take 40 mg by mouth every evening   Yes Historical Provider, MD   sitaGLIPtan-metformin (JANUMET)  MG per tablet Take 1 tablet by mouth 2 times daily (with meals) 6/17/20  Yes Sandeep Aguilera MD   ranolazine (RANEXA) 500 MG extended release tablet Take 1 tablet by mouth 2 times daily 1/29/20  Yes Sandeep Aguilera MD   Fluticasone Propionate (FLONASE NA) by Nasal route   Yes Historical Provider, MD   sitaGLIPtan-metformin (JANUMET)  MG per tablet Take 1 tablet by mouth 2 times daily (with meals)    Yes Historical Provider, MD   losartan (COZAAR) 50 MG tablet Take 1 tablet by mouth daily 2/19/19  Yes Luz Maria Ahn MD   ASPIRIN ADULT LOW DOSE 81 MG EC tablet TAKE ONE TABLET BY MOUTH DAILY 10/22/18  Yes Sandeep Aguilera MD   sotalol (BETAPACE) 80 MG tablet Take 0.5 tablets by mouth 2 times daily 9/27/18  Yes Sandeep Aguilera MD   Evolocumab (REPATHA SC) Inject 140 mg into the skin   Yes Historical Provider, MD   apixaban (ELIQUIS) 5 MG TABS tablet Take 1 tablet by mouth 2 times daily 7/18/18  Yes Sandeep Aguilera MD   albuterol sulfate  (90 Base) MCG/ACT inhaler Inhale 2 puffs into the lungs every 6 hours as needed for Wheezing   Yes Historical Provider, MD   furosemide (LASIX) 20 MG tablet Take 1 tablet by mouth 2 times daily  Patient taking differently: Take 20 mg by mouth daily  6/18/18  Yes Sandeep Aguilera MD   potassium chloride SA (KLOR-CON M20) 20 MEQ tablet TAKE 1 TABLET DAILY 8/16/16  Yes Enoc Morales MD   montelukast (SINGULAIR) 10 MG tablet TAKE 1 TABLET NIGHTLY  Patient taking differently: Take 10 mg by mouth daily TAKE 1 TABLET NIGHTLY 4/12/16  Yes Enoc Morales MD   levothyroxine (SYNTHROID) 25 MCG tablet Take 25 mcg by mouth daily   Yes Historical Provider, MD   Magnesium 400 MG CAPS Take 1 tablet by mouth daily.    Yes Historical Provider, MD   FENOFIBRATE PO Take 145 mg by mouth [place]    2. Ischemic heart disease with    ~CIRC JACK 2009   ~ of the RCA noted in 2003   ~Normal LVEF 57% by MUGA 01/13  3. Anxiety   4. Depression  5. Chronic stable angina   ~controlled on Ranexa   6. Hypertension    ~BP: (130)/(76)    7. Hyperlipidemia  8. Paroxysmal atrial fibrillation   ~CHADs 4    ~Eliquis    ~loop recorder placed 1/2020  9. Diabetes mellitus     Plan:  1. Referral to endocrinology for management of diabetes   2. Increase Janumet to  twice a day for better blood sugar control   3. Establish care with PCP   4. Referral to EP for loop recorder management   5. Follow up with me in December     Scribe's attestation: This note was scribed in the presence of Dr. Neil Ohara M.D. By Ramirez Elliott, Dr. Neil Ohara, personally performed the services described in this documentation, as scribed by the above signed scribe in my presence. It is both accurate and complete to my knowledge. I agree with the details independently gathered by the clinical support staff, while the remaining scribed note accurately describes my personal service to the patient. All questions and concerns were addressed to the patient/family. Alternatives to my treatment were discussed. The note was completed using EMR. Every effort was made to ensure accuracy; however, inadvertent computerized transcription errors may be present.     Neil Ohara MD, [address], [facility]  [phone] [facility] office  [phone] [facility]  6/17/2020  3:43 PM

## 2020-07-02 ENCOUNTER — NURSE ONLY (OUTPATIENT)
Dept: CARDIOLOGY CLINIC | Age: 65
End: 2020-07-02
Payer: COMMERCIAL

## 2020-07-02 PROCEDURE — G2066 INTER DEVC REMOTE 30D: HCPCS | Performed by: INTERNAL MEDICINE

## 2020-07-02 PROCEDURE — 93298 REM INTERROG DEV EVAL SCRMS: CPT | Performed by: INTERNAL MEDICINE

## 2020-07-23 ENCOUNTER — OFFICE VISIT (OUTPATIENT)
Dept: ENDOCRINOLOGY | Age: 65
End: 2020-07-23
Payer: COMMERCIAL

## 2020-07-23 VITALS
HEART RATE: 66 BPM | BODY MASS INDEX: 25.49 KG/M2 | SYSTOLIC BLOOD PRESSURE: 119 MMHG | DIASTOLIC BLOOD PRESSURE: 79 MMHG | HEIGHT: 65 IN | RESPIRATION RATE: 14 BRPM | OXYGEN SATURATION: 97 % | WEIGHT: 153 LBS

## 2020-07-23 LAB — HBA1C MFR BLD: 6.6 %

## 2020-07-23 PROCEDURE — 83036 HEMOGLOBIN GLYCOSYLATED A1C: CPT | Performed by: INTERNAL MEDICINE

## 2020-07-23 PROCEDURE — 99243 OFF/OP CNSLTJ NEW/EST LOW 30: CPT | Performed by: INTERNAL MEDICINE

## 2020-07-23 RX ORDER — AMMONIUM LACTATE 12 G/100G
CREAM TOPICAL
COMMUNITY
Start: 2020-07-08

## 2020-07-23 RX ORDER — SITAGLIPTIN AND METFORMIN HYDROCHLORIDE 1000; 50 MG/1; MG/1
2 TABLET, FILM COATED, EXTENDED RELEASE ORAL DAILY
Qty: 180 TABLET | Refills: 3 | Status: SHIPPED | OUTPATIENT
Start: 2020-07-23 | End: 2021-05-06 | Stop reason: SDUPTHER

## 2020-07-23 ASSESSMENT — ENCOUNTER SYMPTOMS
BACK PAIN: 0
PHOTOPHOBIA: 0
COUGH: 0

## 2020-07-23 NOTE — PROGRESS NOTES
Endocrinology       New Patient Consultation  Diana Titus M.D.   Phone: [phone]   FAX: [phone]       Poly Ennis   YOB: 1955    Date of Visit:  7/23/2020    Allergies   Allergen Reactions    Metformin And Related Anaphylaxis    Macrolides And Ketolides     Sulfa Antibiotics Hives    Symbicort [Budesonide-Formoterol Fumarate]     Toprol Xl [Metoprolol]     Biaxin [Clarithromycin] Nausea And Vomiting    Codeine Nausea And Vomiting    Pcn [Penicillins] Hives    Vicodin [Hydrocodone-Acetaminophen] Nausea And Vomiting    Victoza [Liraglutide] Rash     Outpatient Medications Marked as Taking for the 7/23/20 encounter (Office Visit) with Vernetta Angelucci, MD   Medication Sig Dispense Refill    ammonium lactate (AMLACTIN) 12 % cream       rosuvastatin (CRESTOR) 40 MG tablet Take 40 mg by mouth every evening      sitaGLIPtan-metformin (JANUMET)  MG per tablet Take 1 tablet by mouth 2 times daily (with meals) 180 tablet 0    ranolazine (RANEXA) 500 MG extended release tablet Take 1 tablet by mouth 2 times daily 20 tablet 0    Fluticasone Propionate (FLONASE NA) by Nasal route      sitaGLIPtan-metformin (JANUMET)  MG per tablet Take 1 tablet by mouth 2 times daily (with meals)       losartan (COZAAR) 50 MG tablet Take 1 tablet by mouth daily 30 tablet 5    ASPIRIN ADULT LOW DOSE 81 MG EC tablet TAKE ONE TABLET BY MOUTH DAILY 90 tablet 2    sotalol (BETAPACE) 80 MG tablet Take 0.5 tablets by mouth 2 times daily 90 tablet 3    Evolocumab (REPATHA SC) Inject 140 mg into the skin      apixaban (ELIQUIS) 5 MG TABS tablet Take 1 tablet by mouth 2 times daily 180 tablet 3    albuterol sulfate  (90 Base) MCG/ACT inhaler Inhale 2 puffs into the lungs every 6 hours as needed for Wheezing      furosemide (LASIX) 20 MG tablet Take 1 tablet by mouth 2 times daily (Patient taking differently: Take 20 mg by mouth daily ) 60 tablet 6    potassium chloride SA Psychiatric/Behavioral: Negative for hallucinations and suicidal ideas. The patient is not nervous/anxious. Physical Exam  Constitutional:       General: She is not in acute distress. Appearance: She is well-developed. She is not diaphoretic. Neck:      Thyroid: No thyromegaly. Cardiovascular:      Rate and Rhythm: Normal rate. Heart sounds: Normal heart sounds. Pulmonary:      Effort: Pulmonary effort is normal. No respiratory distress. Breath sounds: No wheezing. Abdominal:      General: Bowel sounds are normal.      Palpations: Abdomen is soft. Tenderness: There is no abdominal tenderness. Skin:     General: Skin is warm and dry. Neurological:      Mental Status: She is alert and oriented to person, place, and time. Psychiatric:         Behavior: Behavior normal.         Thought Content: Thought content normal.               Labs    Lab Results   Component Value Date    LABA1C 6.3 06/29/2018     Lab Results   Component Value Date     01/13/2020    K 4.2 01/13/2020    K 4.3 06/29/2018     01/13/2020    CO2 26 01/13/2020    ANIONGAP 11 01/13/2020    GLUCOSE 158 01/13/2020    BUN 16 01/13/2020    CREATININE 0.7 01/13/2020    LABGLOM >60 01/13/2020    GFRAA >60 01/13/2020    GFRAA >60 01/25/2013    CALCIUM 9.7 01/13/2020    PROT 6.1 08/08/2015    PROT 7.2 01/25/2013    LABALBU 4.6 06/19/2018    AGRATIO 1.9 08/08/2015    BILITOT 0.4 08/08/2015    ALKPHOS 74 08/08/2015    ALT 14 08/08/2015    AST 14 08/08/2015    GLOB 2.1 08/08/2015     Lab Results   Component Value Date    CHOL 130 09/18/2018    TRIG 141 09/18/2018    HDL 67 09/18/2018    LDLCALC 35 09/18/2018    LABVLDL 49 12/19/2012         Assessment/Plan    1. Type 2 DM     Nolan Tate is a 59 y.o. female has Type 2 DM       A1c 6.6 %     Discussed with the patient that her A1c is acceptable.        She wants to use Janumet XR    Will change it to Janumet XR  2 tablets daily.       -Will monitor A1c for now, if it increases then will add Basal insulin. She could not tolerate [place] and wants to avoid other SGLT-2 inhibitors. Could not tolerate victoza due to rash. Avoid GLP-1      Advised follow-up with the ophthalmologist once year. Check Urine microalbumin/cr ratio    Discussed foot care. Follows with podiatrist.       2. Hypertension. BP at goal.    3. Hyperlipidemia. On rosuvastatin and Repatha. Managed by cardiology     4. CAD . As per cardiology . 5. Hypothyroidism . On levothyroxine.  Check TSH

## 2020-08-05 LAB
ALBUMIN SERPL-MCNC: NORMAL G/DL
ALP BLD-CCNC: 57 U/L
ALT SERPL-CCNC: 20 U/L
ANION GAP SERPL CALCULATED.3IONS-SCNC: NORMAL MMOL/L
AST SERPL-CCNC: 20 U/L
BILIRUB SERPL-MCNC: 0.6 MG/DL (ref 0.1–1.4)
BUN BLDV-MCNC: NORMAL MG/DL
CALCIUM SERPL-MCNC: 9.5 MG/DL
CHLORIDE BLD-SCNC: NORMAL MMOL/L
CHOLESTEROL, TOTAL: 146 MG/DL
CHOLESTEROL/HDL RATIO: NORMAL
CO2: NORMAL
CREAT SERPL-MCNC: 95 MG/DL
GFR CALCULATED: NORMAL
GLUCOSE BLD-MCNC: 134 MG/DL
HDLC SERPL-MCNC: 38 MG/DL (ref 35–70)
LDL CHOLESTEROL CALCULATED: 70 MG/DL (ref 0–160)
POTASSIUM SERPL-SCNC: 4.3 MMOL/L
SODIUM BLD-SCNC: 137 MMOL/L
TOTAL PROTEIN: 6.3
TRIGL SERPL-MCNC: 192 MG/DL
VLDLC SERPL CALC-MCNC: NORMAL MG/DL

## 2020-08-06 ENCOUNTER — NURSE ONLY (OUTPATIENT)
Dept: CARDIOLOGY CLINIC | Age: 65
End: 2020-08-06
Payer: COMMERCIAL

## 2020-08-06 PROCEDURE — G2066 INTER DEVC REMOTE 30D: HCPCS | Performed by: INTERNAL MEDICINE

## 2020-08-06 PROCEDURE — 93298 REM INTERROG DEV EVAL SCRMS: CPT | Performed by: INTERNAL MEDICINE

## 2020-08-07 NOTE — PROGRESS NOTES
Remote interrogation of implanted cardiac event monitor shows normal function. ILR to monitor for AF. (hx pAF-oac). No AF recorded to date. No new arrhythmias. Follow up 1 month via carelink.

## 2020-08-10 ENCOUNTER — TELEPHONE (OUTPATIENT)
Dept: CARDIOLOGY CLINIC | Age: 65
End: 2020-08-10

## 2020-08-10 NOTE — TELEPHONE ENCOUNTER
----- Message from Shanna Zepeda MD sent at 8/10/2020 12:51 PM EDT -----  I reviewed the labs and see no major change or issues compared to prior studies. Continue current medical regimen. Call patient with results and recommendations.

## 2020-08-10 NOTE — TELEPHONE ENCOUNTER
Created telephone encounter. Spoke with [facility] relayed message per vsp regarding labs. Pt verbalized understanding.

## 2020-08-31 NOTE — PROGRESS NOTES
[facility]   Electrophysiology Note      Reason for office visit: Other (No new cardiac complaints)      HISTORY OF PRESENT ILLNESS: Sallie Snyder is a 59 y.o. female who presents for follow up for Atrial Fibrillation and Loop Recorder management. Medical history significant for CAD, MI,  AFIB, DM, HLD, HTN. She underwent a cardiac catheterization while in [place]. This showed a 100% occlusion of her right coronary artery that has been noted on her previous angiogram in 2003. No intervention was needed at the time as collaterals had formed. She returned to [place] and followed up with Dr. Ronen Negro. On 1/13/2020 she underwent loop recorder placement due to recurrent episodes of dizziness and palpitations. Isosorbide was stopped at her visit on 1/27/2020 due to dizziness thought to be related to polypharmacy. Her loop recorder interrogation on 6/2/2020 normal functions with no new arrhythmias. She is taking Eliquis. Today she reports feeling ok. She states she wears O2 a night. She states she tried doing a C-pap but is claustrophobic and cannot wear one. She states she is wearing an oral appliance for her sleep apnea now. She is following with pulmonology for this. She states she is walking 2 miles 4-5 times a week without issue. She denies any abnormal bleeding or bruising with taking the Eliquis. Past Medical History:   has a past medical history of A-fib ([address] Anemia, CAD (coronary artery disease), Cholelithiasis, Complication of anesthesia, Diabetes mellitus ([address] GERD (gastroesophageal reflux disease), Hyperlipidemia, Hypertension, MI (myocardial infarction) ([address] Nausea & vomiting, ARACELIS (obstructive sleep apnea), PONV (postoperative nausea and vomiting), Primary osteoarthritis of right hip, Prolonged emergence from general anesthesia, and Seasonal allergies. Past Surgical History:   has a past surgical history that includes Coronary angioplasty with stent (1/2009);  Rotator cuff repair; Hand surgery; Hysterectomy; Cholecystectomy, laparoscopic (9/7/11); other surgical history (Right, 8.8.15); hip surgery; eye surgery (Bilateral); joint replacement (Right, 06/28/2018); Hip Arthroplasty (Right, 06/28/2018); and Insertable Cardiac Monitor (01/13/2020). Social History:   reports that she quit smoking about 33 years ago. Her smoking use included cigarettes. She has a 22.50 pack-year smoking history. She has never used smokeless tobacco. She reports that she does not drink alcohol or use drugs. Family History: family history includes Heart Disease in her mother; High Blood Pressure in her sister. Allergies:  Metformin and related; Macrolides and ketolides; Sulfa antibiotics; Symbicort [budesonide-formoterol fumarate]; Toprol xl [metoprolol]; Biaxin [clarithromycin]; Codeine; Pcn [penicillins]; Vicodin [hydrocodone-acetaminophen]; and Victoza [liraglutide]    Home Medications:    Prior to Admission medications    Medication Sig Start Date End Date Taking?  Authorizing Provider   ammonium lactate (AMLACTIN) 12 % cream  7/8/20  Yes Historical Provider, MD   JANUMET XR  MG TB24 per extended release tablet Take 2 tablets by mouth daily 7/23/20  Yes Cara Lacey MD   rosuvastatin (CRESTOR) 40 MG tablet Take 40 mg by mouth every evening   Yes Historical Provider, MD   ranolazine (RANEXA) 500 MG extended release tablet Take 1 tablet by mouth 2 times daily 1/29/20  Yes Reji Navas MD   Fluticasone Propionate (FLONASE NA) by Nasal route   Yes Historical Provider, MD   losartan (COZAAR) 50 MG tablet Take 1 tablet by mouth daily 2/19/19  Yes Fredi Mckeon MD   ASPIRIN ADULT LOW DOSE 81 MG EC tablet TAKE ONE TABLET BY MOUTH DAILY 10/22/18  Yes Reji Navas MD   sotalol (BETAPACE) 80 MG tablet Take 0.5 tablets by mouth 2 times daily 9/27/18  Yes Reji Navas MD   Evolocumab (REPATHA SC) Inject 140 mg into the skin   Yes Historical Provider, MD   apixaban Camille Bai) 5 MG TABS tablet Take 1 tablet by mouth 2 times daily 7/18/18  Yes Ashanti Blancas MD   albuterol sulfate  (90 Base) MCG/ACT inhaler Inhale 2 puffs into the lungs every 6 hours as needed for Wheezing   Yes Historical Provider, MD   furosemide (LASIX) 20 MG tablet Take 1 tablet by mouth 2 times daily  Patient taking differently: Take 20 mg by mouth daily  6/18/18  Yes Ashanti Blancas MD   potassium chloride SA (KLOR-CON M20) 20 MEQ tablet TAKE 1 TABLET DAILY 8/16/16  Yes Vani Ansari MD   montelukast (SINGULAIR) 10 MG tablet TAKE 1 TABLET NIGHTLY  Patient taking differently: Take 10 mg by mouth daily TAKE 1 TABLET NIGHTLY 4/12/16  Yes Vain Ansari MD   levothyroxine (SYNTHROID) 25 MCG tablet Take 25 mcg by mouth daily   Yes Historical Provider, MD   Magnesium 400 MG CAPS Take 1 tablet by mouth daily. Yes Historical Provider, MD       REVIEW OF SYSTEMS:      All 14-point review of systems are completed and pertinent positives are mentioned in the history of present illness. Other systems are reviewed and are negative. Physical Examination:    /60   Pulse 67   Ht 5' 6\" (1.676 m)   Wt 152 lb (68.9 kg)   SpO2 98%   BMI 24.53 kg/m²    Constitutional and General Appearance: alert, cooperative, no distress and appears stated age  [de-identified]: PERRL, no cervical lymphadenopathy. No masses palpable. Normal oral mucosa  Respiratory:  · Normal excursion and expansion without use of accessory muscles  · Resp Auscultation: Normal breath sounds without dullness or wheezing  Cardiovascular:  · The apical impulse is not displaced  · RRR S1S2 w/o M/G/R  Abdomen:  · No masses or tenderness  · Bowel sounds present  Extremities:  ·  No Cyanosis or Clubbing  ·  Lower extremity edema: No  · Skin: Warm and dry  Neurological:  · Alert and oriented.   · Moves all extremities well  · No abnormalities of mood, affect, memory, mentation, or behavior are noted    DATA:    ECG:  normal EKG, normal sinus rhythm, unchanged from previous tracings. ECHO: 6/13/18  Summary   Low normal left ventricle systolic function with an estimated ejection   fraction of 50%. No regional wall motion abnormalities are seen. Normal left ventricular diastolic filling pressure. In comparison to the previous echo done 7/2013, no significant changes      IMPRESSION:    1. Atrial fibrillation, unspecified type ([address]        1. Atrial fibrillation [id number] score 4. Patient is a pleasant 59year old female with a medical history significant for paroxysmal atrial fibrillation on sotalol and apixaban, ischemic cardiomyopathy, and hypertension who presents from home for follow up. No atrial fibrillation noted on her device. Patient would like to stop apixaban but discussed risk of stroke and so patient will continue for now even though she has ILR which will alert us if atrial fibrillation recurs. Discussed decreasing sotalol which is reasonable. We agreed that she can decrease to 20 mg BID. Will likely want to discontinue in future. Follow up with me in 12/2020.  - Decrease sotalol to 20 mg BID.  - Continue apixaban. - Follow up with me in 12/2020 or PRN. RECOMMENDATIONS:  1. Recommend remaining on the Eliquis. But it is not unreasonable to come off the medication. 2. May decrease the Sotalol to 20 mg twice a day   3. Continue all other medications as prescribed  4. Follow up in December with Dr. Vivek Daniels  1. Tobacco Cessation Counseling: NA  2. Retake of BP if >140/90:   NA  3. Documentation to PCP/referring for new patient:  Sent to PCP at close of office visit  4. CAD patient on anti-platelet: Yes  5. CAD patient on STATIN therapy:  Yes  6. Patient with CHF and aFib on anticoagulation:  No     All questions and concerns were addressed to the patient/family. Alternatives to my treatment were discussed. This note was scribed in the presence of Jagdish Suarez MD by Ernesto Sullivan RN.        Dr. Pedro Horta MD [facility]. [address]. [place] [address]  Phone: [phone]  Fax: [phone]   9/1/2020     NOTE: This report was transcribed using voice recognition software. Every effort was made to ensure accuracy, however, inadvertent computerized transcription errors may be present. The scribe's documentation has been prepared under my direction and personally reviewed by me in its entirety. I confirm that the note above accurately reflects all work, physical examination, the discussion of treatments and procedures, and medical decision making performed by me. Bekah Heck MD personally performed the services described in this documentation as scribed by nurse in my presence, and is both accurate and complete.     Electronically signed by Emily Cazares MD on 9/2/2020 at 11:04 PM

## 2020-09-01 ENCOUNTER — OFFICE VISIT (OUTPATIENT)
Dept: CARDIOLOGY CLINIC | Age: 65
End: 2020-09-01
Payer: MEDICARE

## 2020-09-01 ENCOUNTER — NURSE ONLY (OUTPATIENT)
Dept: CARDIOLOGY CLINIC | Age: 65
End: 2020-09-01

## 2020-09-01 ENCOUNTER — TELEPHONE (OUTPATIENT)
Dept: CARDIOLOGY CLINIC | Age: 65
End: 2020-09-01

## 2020-09-01 VITALS
HEIGHT: 66 IN | HEART RATE: 67 BPM | SYSTOLIC BLOOD PRESSURE: 120 MMHG | BODY MASS INDEX: 24.43 KG/M2 | WEIGHT: 152 LBS | OXYGEN SATURATION: 98 % | DIASTOLIC BLOOD PRESSURE: 60 MMHG

## 2020-09-01 PROCEDURE — 99214 OFFICE O/P EST MOD 30 MIN: CPT | Performed by: INTERNAL MEDICINE

## 2020-09-01 RX ORDER — SOTALOL HYDROCHLORIDE 80 MG/1
20 TABLET ORAL 2 TIMES DAILY
Qty: 90 TABLET | Refills: 3
Start: 2020-09-01 | End: 2020-12-07

## 2020-09-01 NOTE — TELEPHONE ENCOUNTER
Pt's sotalol was changed today at ov with VSP. She is requesting a new script be sent to her pharmacy please.

## 2020-09-01 NOTE — PROGRESS NOTES
Patient comes in for interrogation of their implanted loop recorder. Patient has a history of AF and PVCs. Takes Eliquis and sotalol. Patient's last device interrogation was on 7/2. Since last interrogation, no new arrhythmias recorded. See [facility] report under the Cardiology tab. We will follow the patient remotely.

## 2020-09-02 NOTE — TELEPHONE ENCOUNTER
1. Recommend remaining on the Eliquis. But it is not unreasonable to come off the medication. 2. May decrease the Sotalol to 20 mg twice a day   3. Continue all other medications as prescribed  4. Follow up in December with Dr. Flavio Odom I do not see that this comes in 20 mg tablets please advise.

## 2020-09-03 PROCEDURE — 93000 ELECTROCARDIOGRAM COMPLETE: CPT | Performed by: INTERNAL MEDICINE

## 2020-09-04 ENCOUNTER — TELEPHONE (OUTPATIENT)
Dept: PULMONOLOGY | Age: 65
End: 2020-09-04

## 2020-09-04 NOTE — TELEPHONE ENCOUNTER
limited to the following:    Your Provider(s) may not able to provide medical treatment for your particular condition and you may be required to seek alternative healthcare or emergency care services.  The electronic systems or other security protocols or safeguards used in the Service could fail, causing a breach of privacy of your medical or other information.  Given regulatory requirements in certain jurisdictions, your Provider(s) diagnosis and/or treatment options, especially pertaining to certain prescriptions, may be limited. Acceptance   1. You understand that Services will be provided via Telehealth. This process involves the use of HIPAA compliant and secure, real-time audio-visual interfacing with a qualified and appropriately trained provider located at [facility]. 2. You understand that, under no circumstances, will this session be recorded. 3. You understand that the Provider(s) at [facility] and other clinical participants will be party to the information obtained during the Telehealth session in accordance with best medical practices. 4. You understand that the information obtained during the Telehealth session will be used to help determine the most appropriate treatment options. 5. You understand that You have the right to revoke this consent at any point in time. 6. You understand that Telehealth is voluntary, and that continued treatment is not dependent upon consent. 7. You understand that, in the event of non-consent to Telehealth services and/or technical difficulties, you will obtain services as typically provided in the absence of Telehealth technology. 8. You understand that this consent will be kept in Your medical record. 9. No potential benefits from the use of Telehealth or specific results can be guaranteed. Your condition may not be cured or improved and, in some cases, may get worse.    10. There are limitations in the provision of medical care and treatment via Telehealth and the Service and you may not be able to receive diagnosis and/or treatment through the Service for every condition for which you seek diagnosis and/or treatment. 11. There are potential risks to the use of Telehealth, including but not limited to the risks described in this Telehealth Consent. 12. Your Provider(s) have discussed the use of Telehealth and the Service with you, including the benefits and risks of such and you have provided oral consent to your Provider(s) for the use of Telehealth and the Service. 15. You understand that it is your duty to provide your Provider(s) truthful, accurate and complete information, including all relevant information regarding care that you may have received or may be receiving from other healthcare providers outside of the Service. 14. You understand that each of your Provider(s) may determine in his or sole discretion that your condition is not suitable for diagnosis and/or treatment using the Service, and that you may need to seek medical care and treatment a specialist or other healthcare provider, outside of the Service. 15. You understand that you are fully responsible for payment for all services provided by Provider(s) or through use of the Service and that you may not be able to use third-party insurance. 16. You represent that (a) you have read this Telehealth Consent carefully, (b) you understand the risks and benefits of the Service and the use of Telehealth in the medical care and treatment provided to you by Provider(s) using the Service, and (c) you have the legal capacity and authority to provide this consent for yourself and/or the minor for which you are consenting under applicable federal and state laws, including laws relating to the age of [de-identified] and/or parental/guardian consent.    17. You give your informed consent to the use of Telehealth by Provider(s) using the Service under the terms described in the Terms of Service and this Telehealth Consent. The patient was read the following statement and has consented to the visit as of 9/4/20. The patient has been scheduled for their first telehealth visit on 12/7/20 with Dr. Leonila Vogel.

## 2020-09-10 ENCOUNTER — NURSE ONLY (OUTPATIENT)
Dept: CARDIOLOGY CLINIC | Age: 65
End: 2020-09-10
Payer: MEDICARE

## 2020-09-10 PROCEDURE — 93298 REM INTERROG DEV EVAL SCRMS: CPT | Performed by: INTERNAL MEDICINE

## 2020-09-10 PROCEDURE — G2066 INTER DEVC REMOTE 30D: HCPCS | Performed by: INTERNAL MEDICINE

## 2020-11-18 ENCOUNTER — NURSE ONLY (OUTPATIENT)
Dept: CARDIOLOGY CLINIC | Age: 65
End: 2020-11-18
Payer: COMMERCIAL

## 2020-11-18 PROCEDURE — G2066 INTER DEVC REMOTE 30D: HCPCS | Performed by: INTERNAL MEDICINE

## 2020-11-18 PROCEDURE — 93298 REM INTERROG DEV EVAL SCRMS: CPT | Performed by: INTERNAL MEDICINE

## 2020-12-07 ENCOUNTER — VIRTUAL VISIT (OUTPATIENT)
Dept: PULMONOLOGY | Age: 65
End: 2020-12-07
Payer: MEDICARE

## 2020-12-07 PROCEDURE — 99442 PR PHYS/QHP TELEPHONE EVALUATION 11-20 MIN: CPT | Performed by: INTERNAL MEDICINE

## 2020-12-07 ASSESSMENT — SLEEP AND FATIGUE QUESTIONNAIRES
ESS TOTAL SCORE: 1
HOW LIKELY ARE YOU TO NOD OFF OR FALL ASLEEP IN A CAR, WHILE STOPPED FOR A FEW MINUTES IN TRAFFIC: 0
HOW LIKELY ARE YOU TO NOD OFF OR FALL ASLEEP WHILE SITTING AND READING: 0
HOW LIKELY ARE YOU TO NOD OFF OR FALL ASLEEP WHILE SITTING AND TALKING TO SOMEONE: 0
HOW LIKELY ARE YOU TO NOD OFF OR FALL ASLEEP WHILE LYING DOWN TO REST IN THE AFTERNOON WHEN CIRCUMSTANCES PERMIT: 1
HOW LIKELY ARE YOU TO NOD OFF OR FALL ASLEEP WHEN YOU ARE A PASSENGER IN A CAR FOR AN HOUR WITHOUT A BREAK: 0
HOW LIKELY ARE YOU TO NOD OFF OR FALL ASLEEP WHILE SITTING INACTIVE IN A PUBLIC PLACE: 0
HOW LIKELY ARE YOU TO NOD OFF OR FALL ASLEEP WHILE SITTING QUIETLY AFTER LUNCH WITHOUT ALCOHOL: 0
HOW LIKELY ARE YOU TO NOD OFF OR FALL ASLEEP WHILE WATCHING TV: 0

## 2020-12-07 NOTE — PROGRESS NOTES
[facility]                                                            TELEHEALTH EVALUATION: Service performed was Audio (During [id number] public health emergency) and not a face-to-face visit         CHIEF COMPLAINT: ARACELIS follow up           HPI:   Patient started on OA and doping very well. Using it every night. Patient is using oral appliances 5-6 hrs/night. No significant daytime sleepiness. No nodding off when driving. Bedtime is 10 pm and rise time is 7 am. Sleep onset is 30 minutes. ESS is 1.    Not using O2   Most recent HST showed no significant hypoxemia      Past Medical History:   Diagnosis Date    [address] [address]     Anemia     CAD (coronary artery disease) 2009    Cholelithiasis 3/7397    Complication of anesthesia     Long time to wake up    Diabetes mellitus ([facility]     GERD (gastroesophageal reflux disease)     Hyperlipidemia     Hypertension     MI (myocardial infarction) ([facility] 2004    Nausea & vomiting     ARACELIS (obstructive sleep apnea)     does not use CPAP  (has had some recent weight loss)    PONV (postoperative nausea and vomiting)     Primary osteoarthritis of right hip 6/7/2018    Prolonged emergence from general anesthesia     Seasonal allergies        Past Surgical History:        Procedure Laterality Date    CHOLECYSTECTOMY, LAPAROSCOPIC  9/7/11    laproscopic cholecystectomy with intraoperative cholaniogram    CORONARY ANGIOPLASTY WITH STENT PLACEMENT  1/2009    EYE SURGERY Bilateral     cataract    HAND SURGERY      [employer]eeper Thumb Surgery    HIP ARTHROPLASTY Right 06/28/2018    HIP SURGERY      pin    HYSTERECTOMY      INSERTABLE CARDIAC MONITOR  01/13/2020    LOOP RECORDER    JOINT REPLACEMENT Right 06/28/2018    RTH    OTHER SURGICAL HISTORY Right 8.8.15    right hip pinning    ROTATOR CUFF REPAIR      Right       Allergies:  is allergic to metformin and related; macrolides and ketolides; sulfa antibiotics; symbicort [budesonide-formoterol fumarate]; toprol xl [metoprolol]; biaxin [clarithromycin]; codeine; pcn [penicillins]; vicodin [hydrocodone-acetaminophen]; and victoza [liraglutide]. Social History:    TOBACCO:   reports that she quit smoking about 33 years ago. Her smoking use included cigarettes. She has a 22.50 pack-year smoking history. She has never used smokeless tobacco.  ETOH:   reports no history of alcohol use.       Family History:       Problem Relation Age of Onset    Heart Disease Mother     High Blood Pressure Sister        Current Medications:    Current Outpatient Medications:     ammonium lactate (AMLACTIN) 12 % cream, , Disp: , Rfl:     JANUMET XR  MG TB24 per extended release tablet, Take 2 tablets by mouth daily, Disp: 180 tablet, Rfl: 3    rosuvastatin (CRESTOR) 40 MG tablet, Take 40 mg by mouth every evening, Disp: , Rfl:     ranolazine (RANEXA) 500 MG extended release tablet, Take 1 tablet by mouth 2 times daily (Patient taking differently: Take 500 mg by mouth daily ), Disp: 20 tablet, Rfl: 0    Fluticasone Propionate (FLONASE NA), by Nasal route, Disp: , Rfl:     losartan (COZAAR) 50 MG tablet, Take 1 tablet by mouth daily, Disp: 30 tablet, Rfl: 5    Evolocumab (REPATHA SC), Inject 140 mg into the skin, Disp: , Rfl:     apixaban (ELIQUIS) 5 MG TABS tablet, Take 1 tablet by mouth 2 times daily, Disp: 180 tablet, Rfl: 3    albuterol sulfate  (90 Base) MCG/ACT inhaler, Inhale 2 puffs into the lungs every 6 hours as needed for Wheezing, Disp: , Rfl:     potassium chloride SA (KLOR-CON M20) 20 MEQ tablet, TAKE 1 TABLET DAILY, Disp: 90 tablet, Rfl: 1    montelukast (SINGULAIR) 10 MG tablet, TAKE 1 TABLET NIGHTLY (Patient taking differently: Take 10 mg by mouth daily TAKE 1 TABLET NIGHTLY), Disp: 90 tablet, Rfl: 1    levothyroxine (SYNTHROID) 25 MCG tablet, Take 25 mcg by mouth daily, Disp: , Rfl:     Magnesium 400 MG CAPS, Take 1 tablet by mouth daily., Disp: , Rfl:       Objective:   PHYSICAL EXAM:    Telephone visit not able to obtain physical exam         DATA reviewed by me:   PSG 4/2/2014 AHI 48 and desaturation to 81%   CPAP titration 4/10/2018 APAP 8-20   HST 11/12/2020 AHI 9 minimal oxygen desaturation with saturation below 90% and 1.2%      Overnight pulse oximeter 12/13/2018 desaturation below 88% and 10 minutes  Overnight pulse oximeter 1/9/2019 no significant desaturation on 2 L. Assessment:       · Severe ARACELIS. Failed CPAP. Well-controlled with oral appliances. No significant desaturation on most recent HST 11/12/2020. · Nocturnal hypoxemia-secondary to ARACELIS. Not significant on repeat HST  · PVC, Idioventricular rhythm and Afib on Sotalol followed by cardiology     Plan:       Continue with continue oral appliances  DC O2  Continue follow-up with Dr. Main Alonzo with me as needed  Sleep hygiene  Avoid sedatives, alcohol and caffeinated drinks at bed time. No driving motorized vehicles or operating heavy machinery while fatigue, drowsy or sleepy. Jayden Kenney is a 72 y.o. female evaluated via telephone on 12/7/2020. Consent:  She and/or health care decision maker is aware that that she may receive a bill for this telephone service, depending on her insurance coverage, and has provided verbal consent to proceed: Yes       Documentation:  I communicated with the patient and/or health care decision maker about: See above   Details of this discussion including any medical advice provided: See above       I Affirm this is a Patient Initiated Episode with an Established Patient who has not had a related appointment within my department in the past 7 days or scheduled within the next 24 hours.     Total Time: 11-20 minutes     Note: not billable if this call serves to triage the patient into an appointment for the relevant concern      Paula Gresham

## 2020-12-14 ENCOUNTER — TELEPHONE (OUTPATIENT)
Dept: PULMONOLOGY | Age: 65
End: 2020-12-14

## 2020-12-15 ENCOUNTER — NURSE ONLY (OUTPATIENT)
Dept: CARDIOLOGY CLINIC | Age: 65
End: 2020-12-15

## 2020-12-15 ENCOUNTER — OFFICE VISIT (OUTPATIENT)
Dept: CARDIOLOGY CLINIC | Age: 65
End: 2020-12-15
Payer: MEDICARE

## 2020-12-15 VITALS
SYSTOLIC BLOOD PRESSURE: 128 MMHG | HEART RATE: 79 BPM | HEIGHT: 67 IN | OXYGEN SATURATION: 98 % | DIASTOLIC BLOOD PRESSURE: 72 MMHG | WEIGHT: 152 LBS | BODY MASS INDEX: 23.86 KG/M2

## 2020-12-15 VITALS
WEIGHT: 152 LBS | SYSTOLIC BLOOD PRESSURE: 128 MMHG | BODY MASS INDEX: 23.86 KG/M2 | OXYGEN SATURATION: 98 % | HEIGHT: 67 IN | DIASTOLIC BLOOD PRESSURE: 72 MMHG | HEART RATE: 79 BPM

## 2020-12-15 PROCEDURE — G8427 DOCREV CUR MEDS BY ELIG CLIN: HCPCS | Performed by: INTERNAL MEDICINE

## 2020-12-15 PROCEDURE — 1090F PRES/ABSN URINE INCON ASSESS: CPT | Performed by: INTERNAL MEDICINE

## 2020-12-15 PROCEDURE — 3017F COLORECTAL CA SCREEN DOC REV: CPT | Performed by: INTERNAL MEDICINE

## 2020-12-15 PROCEDURE — 93000 ELECTROCARDIOGRAM COMPLETE: CPT | Performed by: INTERNAL MEDICINE

## 2020-12-15 PROCEDURE — 1123F ACP DISCUSS/DSCN MKR DOCD: CPT | Performed by: INTERNAL MEDICINE

## 2020-12-15 PROCEDURE — G8400 PT W/DXA NO RESULTS DOC: HCPCS | Performed by: INTERNAL MEDICINE

## 2020-12-15 PROCEDURE — G8420 CALC BMI NORM PARAMETERS: HCPCS | Performed by: INTERNAL MEDICINE

## 2020-12-15 PROCEDURE — 3044F HG A1C LEVEL LT 7.0%: CPT | Performed by: INTERNAL MEDICINE

## 2020-12-15 PROCEDURE — G8484 FLU IMMUNIZE NO ADMIN: HCPCS | Performed by: INTERNAL MEDICINE

## 2020-12-15 PROCEDURE — 1036F TOBACCO NON-USER: CPT | Performed by: INTERNAL MEDICINE

## 2020-12-15 PROCEDURE — 2022F DILAT RTA XM EVC RTNOPTHY: CPT | Performed by: INTERNAL MEDICINE

## 2020-12-15 PROCEDURE — 99213 OFFICE O/P EST LOW 20 MIN: CPT | Performed by: INTERNAL MEDICINE

## 2020-12-15 PROCEDURE — 4040F PNEUMOC VAC/ADMIN/RCVD: CPT | Performed by: INTERNAL MEDICINE

## 2020-12-15 PROCEDURE — 99214 OFFICE O/P EST MOD 30 MIN: CPT | Performed by: INTERNAL MEDICINE

## 2020-12-15 RX ORDER — ASPIRIN 81 MG/1
81 TABLET, CHEWABLE ORAL DAILY
COMMUNITY

## 2020-12-15 NOTE — PROGRESS NOTES
([facility] [address] Anemia, CAD (coronary artery disease), Cholelithiasis, Complication of anesthesia, Diabetes mellitus ([facility] [address] GERD (gastroesophageal reflux disease), Hyperlipidemia, Hypertension, MI (myocardial infarction) ([facility] [address] Nausea & vomiting, ARACELIS (obstructive sleep apnea), PONV (postoperative nausea and vomiting), Primary osteoarthritis of right hip, Prolonged emergence from general anesthesia, and Seasonal allergies. Surgical History:   has a past surgical history that includes Coronary angioplasty with stent (1/2009); Rotator cuff repair; Hand surgery; Hysterectomy; Cholecystectomy, laparoscopic (9/7/11); other surgical history (Right, 8.8.15); hip surgery; eye surgery (Bilateral); joint replacement (Right, 06/28/2018); Hip Arthroplasty (Right, 06/28/2018); and Insertable Cardiac Monitor (01/13/2020). Social History:   reports that she quit smoking about 33 years ago. Her smoking use included cigarettes. She has a 22.50 pack-year smoking history. She has never used smokeless tobacco. She reports that she does not drink alcohol or use drugs. Family History:  No evidence for sudden cardiac death or premature CAD    Medications:  Reviewed and are listed in nursing record. and/or listed below  Outpatient Medications:  Prior to Admission medications    Medication Sig Start Date End Date Taking?  Authorizing Provider   aspirin 81 MG chewable tablet Take 81 mg by mouth daily   Yes Historical Provider, MD   ammonium lactate (AMLACTIN) 12 % cream  7/8/20  Yes Historical Provider, MD   JANUMET XR  MG TB24 per extended release tablet Take 2 tablets by mouth daily 7/23/20  Yes Jakob Greenberg MD   rosuvastatin (CRESTOR) 40 MG tablet Take 40 mg by mouth every evening   Yes Historical Provider, MD   ranolazine (RANEXA) 500 MG extended release tablet Take 1 tablet by mouth 2 times daily 1/29/20  Yes Mansih Hutchison MD   Fluticasone Propionate (FLONASE NA) by Nasal route   Yes Historical Provider, MD   losartan (COZAAR) 50 MG tablet Take 1 tablet by mouth daily 2/19/19  Yes Kate Arreguin MD   Evolocumab (REPATHA SC) Inject 140 mg into the skin   Yes Historical Provider, MD   apixaban (ELIQUIS) 5 MG TABS tablet Take 1 tablet by mouth 2 times daily 7/18/18  Yes Miguel Ángel Francois MD   albuterol sulfate  (90 Base) MCG/ACT inhaler Inhale 2 puffs into the lungs every 6 hours as needed for Wheezing   Yes Historical Provider, MD   potassium chloride SA (KLOR-CON M20) 20 MEQ tablet TAKE 1 TABLET DAILY 8/16/16  Yes Garcia Merchant MD   montelukast (SINGULAIR) 10 MG tablet TAKE 1 TABLET NIGHTLY  Patient taking differently: Take 10 mg by mouth daily TAKE 1 TABLET NIGHTLY 4/12/16  Yes Garcia Merchant MD   levothyroxine (SYNTHROID) 25 MCG tablet Take 25 mcg by mouth daily   Yes Historical Provider, MD   Magnesium 400 MG CAPS Take 1 tablet by mouth daily. Yes Historical Provider, MD       In-patient schedule medications:        Infusion Medications: Allergies:  Metformin and related, Macrolides and ketolides, Sulfa antibiotics, Symbicort [budesonide-formoterol fumarate], Toprol xl [metoprolol], Biaxin [clarithromycin], Codeine, Pcn [penicillins], Vicodin [hydrocodone-acetaminophen], and Victoza [liraglutide]     Review of Systems:   All 14 point review of symptoms completed. Pertinent positives identified in the HPI, all other review of symptoms findings as below.      Review of Systems - History obtained from the patient  General ROS: negative for - chills, fever or night sweats  Psychological ROS: negative for - disorientation or hallucinations  Ophthalmic ROS: negative for - dry eyes, eye pain or loss of vision  ENT ROS: negative for - nasal discharge or sore throat  Allergy and Immunology ROS: negative for - hives or itchy/watery eyes  Hematological and Lymphatic ROS: negative for - jaundice or night sweats  Endocrine ROS: negative for - mood swings or temperature intolerance  Breast ROS: deferred  Respiratory ROS: negative for - hemoptysis or stridor  Cardiovascular ROS: negative for - chest pain, dyspnea on exertion or palpitations  Gastrointestinal ROS: no abdominal pain, change in bowel habits, or black or bloody stools  Genito-Urinary ROS: no dysuria, trouble voiding, or hematuria  Musculoskeletal ROS: negative for - gait disturbance, joint pain or joint stiffness  Neurological ROS: negative for - seizures or speech problems  Dermatological ROS: negative for - rash or skin lesion changes      Physical Examination:    /72   Pulse 79   Ht 5' 6.5\" (1.689 m)   Wt 152 lb (68.9 kg)   SpO2 98%   BMI 24.17 kg/m²   /72   Pulse 79   Ht 5' 6.5\" (1.689 m)   Wt 152 lb (68.9 kg)   SpO2 98%   BMI 24.17 kg/m²    Weight: 152 lb (68.9 kg)     Wt Readings from Last 3 Encounters:   12/15/20 152 lb (68.9 kg)   12/15/20 152 lb (68.9 kg)   09/01/20 152 lb (68.9 kg)     No intake or output data in the 24 hours ending 12/15/20 0817    General Appearance:  Alert, cooperative, no distress, appears stated age   Head:  Normocephalic, without obvious abnormality, atraumatic   Eyes:  PERRL, conjunctiva/corneas clear       Nose: Nares normal, no drainage or sinus tenderness   Throat: Lips, mucosa, and tongue normal   Neck: Supple, symmetrical, trachea midline, no adenopathy, thyroid: not enlarged, symmetric, no tenderness/mass/nodules, no carotid bruit or JVD       Lungs:   Clear to auscultation bilaterally, respirations unlabored   Chest Wall:  No tenderness or deformity   Heart:  Regular rhythm and normal rate; S1, S2 are normal; no murmur noted; no rub or gallop   Abdomen:   Soft, non-tender, bowel sounds active all four quadrants,  no masses, no organomegaly           Extremities: Extremities normal, atraumatic, no cyanosis or edema   Pulses: 2+ and symmetric   Skin: Skin color, texture, turgor normal, no rashes or lesions   Pysch: Normal mood and affect   Neurologic: Normal gross motor and sensory exam.         Labs  No results for input(s): WBC, HGB, HCT, MCV, PLT in the last 72 hours. No results for input(s): CREATININE, BUN, NA, K, CL, CO2 in the last 72 hours. No results for input(s): INR, PROTIME in the last 72 hours. No results for input(s): TROPONINI in the last 72 hours. Invalid input(s): PRO-BNP  No results for input(s): CHOL, HDL in the last 72 hours. Invalid input(s): LDL, TG      Imaging:  I have reviewed the below testing personally and my interpretation is below. EKG:  CXR:      Assessment:  72 y.o. patient with:  1. Coronary artery disease    ~cath in [place]     ~Stable   2. Ischemic heart disease with    ~CIRC JACK 2009   ~ of the RCA noted in 2003   ~Normal LVEF 57% by MUGA 01/13  3. Anxiety   4. Depression  5. Chronic stable angina   ~controlled on Ranexa   6. Hypertension    ~BP: (128)/(72)    7. Hyperlipidemia  8. Paroxysmal atrial fibrillation   ~CHADs 4    ~Eliquis    ~loop recorder placed 1/2020  9. Diabetes mellitus   10. obstructive sleep apnea     Plan:  1. I recommend that the patient continue their currently prescribed medications. Their drug modifiable risk factors appear to be well controlled. I will continue to address the need/dosing of medications in future visits. 2. Follow up with me in 1 year. This note was scribed in the presence of Dr. Cassius García MD by Maricruz Monge RN.     I, Dr. Cassius García, personally performed the services described in this documentation, as scribed by the above signed scribe in my presence. It is both accurate and complete to my knowledge. I agree with the details independently gathered by the clinical support staff, while the remaining scribed note accurately describes my personal service to the patient. All questions and concerns were addressed to the patient/family. Alternatives to my treatment were discussed. The note was completed using EMR.  Every effort was made to ensure accuracy; however, inadvertent computerized transcription errors may be present.     Rosanna Lopez MD, [address], [facility]  [phone] [facility] office  [phone] [facility]  12/15/2020  5:37 PM

## 2020-12-15 NOTE — PROGRESS NOTES
[facility] check. Pt to see AJK today. Additional Notes  Battery is ok -No Arrhythmia episodes detected since last cleared on 07-dec-2020    See PACEART report under Cardiology tab.

## 2020-12-15 NOTE — PROGRESS NOTES
[address]   Electrophysiology Note      Date: 12/15/20  Patient Name: Wes Boone  YOB: 1955     Chief Complaint: 3 Month Follow-Up and Atrial Fibrillation    Primary Care Physician: MARIA R Gamino CNP     HISTORY OF PRESENT ILLNESS: Wes Boone is a 72 y.o. female who presents for follow up for Atrial Fibrillation and Loop Recorder management. Medical history significant for CAD, MI,  AFIB, DM, HLD, HTN. She underwent a cardiac catheterization while in [place]. This showed a 100% occlusion of her right coronary artery that has been noted on her previous angiogram in 2003. No intervention was needed at the time as collaterals had formed. She returned to [place] and followed up with Dr. Carol Brown. On 1/13/2020 she underwent loop recorder placement due to recurrent episodes of dizziness and palpitations. Isosorbide was stopped at her visit on 1/27/2020 due to dizziness thought to be related to polypharmacy. Her loop recorder interrogation on 6/2/2020 normal function with no new arrhythmias. At her office visit on 9/1/20 she stated she wears O2 a night. She stated she tried wearing a CPAP but is claustrophobic and cannot tolerate it. She stated she is wearing an oral appliance for her sleep apnea now. She stated she is walking 2 miles 4-5 times a week without issue. During this office visit on 9/1/20 her sotalol was decreased to 20 mg BID and ultimately stopped on 9/3/20. .     Interval history since last office visit: Her EKG today 12/15/20 demonstrates SR with HR 69 BPM. Her device interrogation today 12/15/20 demonstrates no new arrhthymias. Today 12/15/20 she reports she is feeling very well from a cardiac standpoint. She reports she is getting adequate rest at night while wearing her oxygen. She reports due to this, she is much less fatigued during the day. She reports she is taking her Eliquis 5 mg BID as prescribed and tolerates that well.  She denies any Provider, MD   ammonium lactate (AMLACTIN) 12 % cream  7/8/20  Yes Historical Provider, MD   JANUMET XR  MG TB24 per extended release tablet Take 2 tablets by mouth daily 7/23/20  Yes Caroline Ku MD   rosuvastatin (CRESTOR) 40 MG tablet Take 40 mg by mouth every evening   Yes Historical Provider, MD   Fluticasone Propionate (FLONASE NA) by Nasal route   Yes Historical Provider, MD   losartan (COZAAR) 50 MG tablet Take 1 tablet by mouth daily 2/19/19  Yes Mary Ellen Blevins MD   Evolocumab (REPATHA SC) Inject 140 mg into the skin   Yes Historical Provider, MD   apixaban (ELIQUIS) 5 MG TABS tablet Take 1 tablet by mouth 2 times daily 7/18/18  Yes Charlie Gaitan MD   albuterol sulfate  (90 Base) MCG/ACT inhaler Inhale 2 puffs into the lungs every 6 hours as needed for Wheezing   Yes Historical Provider, MD   potassium chloride SA (KLOR-CON M20) 20 MEQ tablet TAKE 1 TABLET DAILY 8/16/16  Yes Denisa Lorenzo MD   montelukast (SINGULAIR) 10 MG tablet TAKE 1 TABLET NIGHTLY  Patient taking differently: Take 10 mg by mouth daily TAKE 1 TABLET NIGHTLY 4/12/16  Yes Denisa Lorenzo MD   levothyroxine (SYNTHROID) 25 MCG tablet Take 25 mcg by mouth daily   Yes Historical Provider, MD   Magnesium 400 MG CAPS Take 1 tablet by mouth daily. Yes Historical Provider, MD   ranolazine (RANEXA) 500 MG extended release tablet Take 1 tablet by mouth 2 times daily  Patient not taking: Reported on 12/15/2020 1/29/20   Charlie Gaitan MD       REVIEW OF SYSTEMS:      All 14-point review of systems are completed and pertinent positives are mentioned in the history of present illness. Other systems are reviewed and are negative. Physical Examination:    /72   Pulse 79   Ht 5' 6.5\" (1.689 m)   Wt 152 lb (68.9 kg)   SpO2 98%   BMI 24.17 kg/m²    Constitutional and General Appearance: alert, cooperative, no distress and appears stated age  [de-identified]: PERRL, no cervical lymphadenopathy. No masses palpable. Normal oral mucosa  Respiratory:  · Normal excursion and expansion without use of accessory muscles  · Resp Auscultation: Normal breath sounds without dullness or wheezing  Cardiovascular:  · The apical impulse is not displaced  · RRR S1S2 w/o M/G/R  Abdomen:  · No masses or tenderness  · Bowel sounds present  Extremities:  ·  No Cyanosis or Clubbing  ·  Lower extremity edema: No  · Skin: Warm and dry  Neurological:  · Alert and oriented. · Moves all extremities well  · No abnormalities of mood, affect, memory, mentation, or behavior are noted    DATA:    EC/15/20: SR with HR 69 BPM    ECHO: 18: Summary   Low normal left ventricle systolic function with an estimated ejection   fraction of 50%. No regional wall motion abnormalities are seen. Normal left ventricular diastolic filling pressure. In comparison to the previous echo done 2013, no significant changes      IMPRESSION:    1. Atrial fibrillation, unspecified type ([address]        1. Atrial fibrillation [id number] score 4. Patient is a pleasant 59year old female with a medical history significant for paroxysmal atrial fibrillation on sotalol and apixaban, ischemic cardiomyopathy, and hypertension who presents from home for follow up. No atrial fibrillation noted on her device. Patient would like to stop apixaban but discussed risk of stroke and so patient will continue for now even though she has ILR which will alert us if atrial fibrillation recurs. Discussed decreasing sotalol which is reasonable. We agreed that she can decrease to 20 mg BID. Will likely want to discontinue in future. Follow up with me in 2020.  - Decrease sotalol to 20 mg BID.  - Continue apixaban. - Follow up with me in 2020 or PRN. 12/15/2020  Patient presents for follow-up. Her device shows no new arrhythmias. Patient is doing well. No complaints. No signs or symptoms of heart failure. No bleeding issues. No signs or symptoms of heart failure.  Follow-up with NP in 1 year or as needed with me with any prior issues.  - Ok to hold sotalol.  - Continue apixaban. - Follow up with EP NP in 1 year unless/until procedure/discussion required or PRN. RECOMMENDATIONS:  1. Continue current medications as prescribed  2. Continue remote device checks every month  3. Follow up with EP NP in 1 year     QUALITY MEASURES  1. Tobacco Cessation Counseling: NA  2. Retake of BP if >140/90:   NA  3. Documentation to PCP/referring for new patient:  Sent to PCP at close of office visit  4. CAD patient on anti-platelet: Yes  5. CAD patient on STATIN therapy:  Yes  6. Patient with CHF and aFib on anticoagulation:  No     All questions and concerns were addressed to the patient/family. Alternatives to my treatment were discussed. This note was scribed in the presence of Alexandria Negron MD by Farrah Garcia. Kennedi Noriega RN. Dr. Tuan Reyes MD  [facility]. [address] [place] [address]. [address]. [facility] [address]  Phone: [phone]  Fax: [phone]   12/15/2020     NOTE: This report was transcribed using voice recognition software. Every effort was made to ensure accuracy, however, inadvertent computerized transcription errors may be present. The scribe's documentation has been prepared under my direction and personally reviewed by me in its entirety. I confirm that the note above accurately reflects all work, physical examination, the discussion of treatments and procedures, and medical decision making performed by me. Daisy Chopra MD personally performed the services described in this documentation as scribed by nurse in my presence, and is both accurate and complete.     Electronically signed by Sha Hearn MD on 12/21/2020 at 6:32 PM

## 2020-12-15 NOTE — PATIENT INSTRUCTIONS
RECOMMENDATIONS:  1. Continue current medications as prescribed   2. Continue remote device checks every month  3.  Follow up with EP NP in 1 year

## 2021-01-15 ENCOUNTER — TELEPHONE (OUTPATIENT)
Dept: CARDIOLOGY CLINIC | Age: 66
End: 2021-01-15

## 2021-01-15 NOTE — TELEPHONE ENCOUNTER
Pt will be out town for 3 days, returning on 1/17/2021. She forgot her base to Loop recorder and not sure how that works. Please advise. What will happen since she forgot base.  TY

## 2021-01-19 PROCEDURE — G2066 INTER DEVC REMOTE 30D: HCPCS | Performed by: INTERNAL MEDICINE

## 2021-01-19 PROCEDURE — 93298 REM INTERROG DEV EVAL SCRMS: CPT | Performed by: INTERNAL MEDICINE

## 2021-01-20 ENCOUNTER — NURSE ONLY (OUTPATIENT)
Dept: CARDIOLOGY CLINIC | Age: 66
End: 2021-01-20
Payer: MEDICARE

## 2021-01-20 DIAGNOSIS — Z45.09 ENCOUNTER FOR LOOP RECORDER CHECK: ICD-10-CM

## 2021-01-20 DIAGNOSIS — I48.91 ATRIAL FIBRILLATION, UNSPECIFIED TYPE (HCC): ICD-10-CM

## 2021-01-20 NOTE — PROGRESS NOTES
Remote interrogation of implanted cardiac event monitor shows normal function. ILR to monitor for AF. (hx pAF-oac). No AF recorded to date.   Last interrogation/Ov ajk 12/15. No new arrhythmias. Follow up 1 month via [facility].

## 2021-02-23 PROCEDURE — 93298 REM INTERROG DEV EVAL SCRMS: CPT | Performed by: INTERNAL MEDICINE

## 2021-02-23 PROCEDURE — G2066 INTER DEVC REMOTE 30D: HCPCS | Performed by: INTERNAL MEDICINE

## 2021-02-24 ENCOUNTER — NURSE ONLY (OUTPATIENT)
Dept: CARDIOLOGY CLINIC | Age: 66
End: 2021-02-24
Payer: MEDICARE

## 2021-02-24 DIAGNOSIS — Z45.09 ENCOUNTER FOR LOOP RECORDER CHECK: ICD-10-CM

## 2021-02-24 DIAGNOSIS — I49.3 PREMATURE VENTRICULAR BEAT: ICD-10-CM

## 2021-02-24 DIAGNOSIS — I48.91 ATRIAL FIBRILLATION, UNSPECIFIED TYPE (HCC): ICD-10-CM

## 2021-02-25 NOTE — PROGRESS NOTES
Remote interrogation of implanted cardiac event monitor shows normal function. ILR to monitor for AF. (hx pAF-oac). No AF recorded to date.    No  arrhythmias recorded. Current EC2021 00:04:50-sinus w/ pvc's. Follow up 1 month via [employer].

## 2021-03-31 ENCOUNTER — NURSE ONLY (OUTPATIENT)
Dept: CARDIOLOGY CLINIC | Age: 66
End: 2021-03-31
Payer: MEDICARE

## 2021-03-31 DIAGNOSIS — I48.91 ATRIAL FIBRILLATION, UNSPECIFIED TYPE (HCC): ICD-10-CM

## 2021-03-31 DIAGNOSIS — I49.3 PREMATURE VENTRICULAR BEAT: ICD-10-CM

## 2021-03-31 DIAGNOSIS — Z45.09 ENCOUNTER FOR LOOP RECORDER CHECK: ICD-10-CM

## 2021-03-31 PROCEDURE — G2066 INTER DEVC REMOTE 30D: HCPCS | Performed by: INTERNAL MEDICINE

## 2021-04-02 NOTE — PROGRESS NOTES
Remote interrogation of implanted cardiac event monitor shows normal function. ILR to monitor for AF. (hx pAF-oac). No AF recorded to date.    No  arrhythmias recorded. Follow up 1 month via [facility].

## 2021-04-07 PROCEDURE — 93298 REM INTERROG DEV EVAL SCRMS: CPT | Performed by: INTERNAL MEDICINE

## 2021-05-05 ENCOUNTER — TELEPHONE (OUTPATIENT)
Dept: ENDOCRINOLOGY | Age: 66
End: 2021-05-05

## 2021-05-05 ENCOUNTER — NURSE ONLY (OUTPATIENT)
Dept: CARDIOLOGY CLINIC | Age: 66
End: 2021-05-05
Payer: MEDICARE

## 2021-05-05 ENCOUNTER — TELEPHONE (OUTPATIENT)
Dept: CARDIOLOGY CLINIC | Age: 66
End: 2021-05-05

## 2021-05-05 DIAGNOSIS — I48.91 ATRIAL FIBRILLATION, UNSPECIFIED TYPE (HCC): ICD-10-CM

## 2021-05-05 DIAGNOSIS — Z45.09 ENCOUNTER FOR LOOP RECORDER CHECK: ICD-10-CM

## 2021-05-05 NOTE — TELEPHONE ENCOUNTER
Patient is requesting a refill on her janumet XR  She takes 2 tabs daily        [facility] mail service

## 2021-05-06 RX ORDER — SITAGLIPTIN AND METFORMIN HYDROCHLORIDE 1000; 50 MG/1; MG/1
2 TABLET, FILM COATED, EXTENDED RELEASE ORAL DAILY
Qty: 180 TABLET | Refills: 3 | Status: SHIPPED | OUTPATIENT
Start: 2021-05-06 | End: 2021-08-18

## 2021-05-06 NOTE — TELEPHONE ENCOUNTER
Medication:   Requested Prescriptions     Pending Prescriptions Disp Refills    JANUMET XR  MG TB24 per extended release tablet 180 tablet 3     Sig: Take 2 tablets by mouth daily       Last Filled:      Patient Phone Number: [phone] (home)     Last appt: 7/23/2020   Next appt: 8/18/2021    Last Labs DM:   Lab Results   Component Value Date    LABA1C 6.6 07/23/2020

## 2021-05-14 PROCEDURE — 93298 REM INTERROG DEV EVAL SCRMS: CPT | Performed by: INTERNAL MEDICINE

## 2021-05-14 PROCEDURE — G2066 INTER DEVC REMOTE 30D: HCPCS | Performed by: INTERNAL MEDICINE

## 2021-06-09 ENCOUNTER — NURSE ONLY (OUTPATIENT)
Dept: CARDIOLOGY CLINIC | Age: 66
End: 2021-06-09

## 2021-06-09 DIAGNOSIS — Z45.09 ENCOUNTER FOR LOOP RECORDER CHECK: ICD-10-CM

## 2021-06-09 DIAGNOSIS — I48.91 ATRIAL FIBRILLATION, UNSPECIFIED TYPE (HCC): ICD-10-CM

## 2021-06-09 DIAGNOSIS — I49.3 PREMATURE VENTRICULAR BEAT: ICD-10-CM

## 2021-06-09 NOTE — PROGRESS NOTES
Remote interrogation of implanted cardiac event monitor shows normal function. Patient has a history of PVCs and AF. Takes Eliquis. Patient's last device interrogation was on 5/5. Since last interrogation, no arrhythmias recorded. LOUISE CHUN to review. See Paceart report under the Cardiology tab. Follow up 1 month via [facility].

## 2021-06-11 PROCEDURE — 93298 REM INTERROG DEV EVAL SCRMS: CPT | Performed by: INTERNAL MEDICINE

## 2021-06-11 PROCEDURE — G2066 INTER DEVC REMOTE 30D: HCPCS | Performed by: INTERNAL MEDICINE

## 2021-07-21 ENCOUNTER — NURSE ONLY (OUTPATIENT)
Dept: CARDIOLOGY CLINIC | Age: 66
End: 2021-07-21
Payer: MEDICARE

## 2021-07-21 DIAGNOSIS — Z45.09 ENCOUNTER FOR LOOP RECORDER CHECK: ICD-10-CM

## 2021-07-21 DIAGNOSIS — I48.91 ATRIAL FIBRILLATION, UNSPECIFIED TYPE (HCC): ICD-10-CM

## 2021-07-21 NOTE — PROGRESS NOTES
Remote transmission received for patients ILR. EP physician will review. See interrogation under the cardiology tab in the [address] [place] [address] [address] field for more details. Will continue to monitor remotely. ILR to monitor for AF. (hx pAF-oac). No AF recorded to date.   No new arrhythmias/events recorded.

## 2021-07-22 PROCEDURE — 93298 REM INTERROG DEV EVAL SCRMS: CPT | Performed by: INTERNAL MEDICINE

## 2021-07-22 PROCEDURE — G2066 INTER DEVC REMOTE 30D: HCPCS | Performed by: INTERNAL MEDICINE

## 2021-08-18 ENCOUNTER — OFFICE VISIT (OUTPATIENT)
Dept: ENDOCRINOLOGY | Age: 66
End: 2021-08-18
Payer: MEDICARE

## 2021-08-18 VITALS
WEIGHT: 140.4 LBS | BODY MASS INDEX: 22.03 KG/M2 | OXYGEN SATURATION: 96 % | HEIGHT: 67 IN | HEART RATE: 76 BPM | SYSTOLIC BLOOD PRESSURE: 115 MMHG | DIASTOLIC BLOOD PRESSURE: 74 MMHG

## 2021-08-18 DIAGNOSIS — E11.9 TYPE 2 DIABETES MELLITUS WITHOUT COMPLICATION, WITHOUT LONG-TERM CURRENT USE OF INSULIN (HCC): Primary | ICD-10-CM

## 2021-08-18 DIAGNOSIS — E03.9 ACQUIRED HYPOTHYROIDISM: ICD-10-CM

## 2021-08-18 LAB — HBA1C MFR BLD: 5.9 %

## 2021-08-18 PROCEDURE — G8400 PT W/DXA NO RESULTS DOC: HCPCS | Performed by: INTERNAL MEDICINE

## 2021-08-18 PROCEDURE — 2022F DILAT RTA XM EVC RTNOPTHY: CPT | Performed by: INTERNAL MEDICINE

## 2021-08-18 PROCEDURE — 1090F PRES/ABSN URINE INCON ASSESS: CPT | Performed by: INTERNAL MEDICINE

## 2021-08-18 PROCEDURE — 83036 HEMOGLOBIN GLYCOSYLATED A1C: CPT | Performed by: INTERNAL MEDICINE

## 2021-08-18 PROCEDURE — 1036F TOBACCO NON-USER: CPT | Performed by: INTERNAL MEDICINE

## 2021-08-18 PROCEDURE — 1123F ACP DISCUSS/DSCN MKR DOCD: CPT | Performed by: INTERNAL MEDICINE

## 2021-08-18 PROCEDURE — 99215 OFFICE O/P EST HI 40 MIN: CPT | Performed by: INTERNAL MEDICINE

## 2021-08-18 PROCEDURE — G8420 CALC BMI NORM PARAMETERS: HCPCS | Performed by: INTERNAL MEDICINE

## 2021-08-18 PROCEDURE — G8427 DOCREV CUR MEDS BY ELIG CLIN: HCPCS | Performed by: INTERNAL MEDICINE

## 2021-08-18 PROCEDURE — 3046F HEMOGLOBIN A1C LEVEL >9.0%: CPT | Performed by: INTERNAL MEDICINE

## 2021-08-18 PROCEDURE — 4040F PNEUMOC VAC/ADMIN/RCVD: CPT | Performed by: INTERNAL MEDICINE

## 2021-08-18 PROCEDURE — 3017F COLORECTAL CA SCREEN DOC REV: CPT | Performed by: INTERNAL MEDICINE

## 2021-08-18 RX ORDER — ORAL SEMAGLUTIDE 3 MG/1
TABLET ORAL
Qty: 30 TABLET | Refills: 0 | Status: SHIPPED | OUTPATIENT
Start: 2021-08-18 | End: 2021-10-11 | Stop reason: SDUPTHER

## 2021-08-18 RX ORDER — LOSARTAN POTASSIUM 100 MG/1
TABLET ORAL
COMMUNITY
Start: 2021-06-04 | End: 2021-08-18 | Stop reason: ALTCHOICE

## 2021-08-18 RX ORDER — ORAL SEMAGLUTIDE 7 MG/1
TABLET ORAL
Qty: 90 TABLET | Refills: 0 | Status: SHIPPED | OUTPATIENT
Start: 2021-08-18 | End: 2021-10-14 | Stop reason: SDUPTHER

## 2021-08-18 RX ORDER — ORAL SEMAGLUTIDE 7 MG/1
TABLET ORAL
Qty: 30 TABLET | Refills: 1 | Status: SHIPPED | OUTPATIENT
Start: 2021-08-18 | End: 2021-08-18 | Stop reason: SDUPTHER

## 2021-08-18 RX ORDER — FUROSEMIDE 40 MG/1
20 TABLET ORAL
COMMUNITY
Start: 2021-06-04

## 2021-08-18 NOTE — PROGRESS NOTES
HPI      Ena Lanier is a 72 y.o. female who is here for evaluation of uncontrolled DM. Seen as new patient    Has seen Dr. Torres Old    Will be moving to [place] [employer] her cardiologist told her DM not controlled? Was told to use CGM  States does not want to continue Janumet  Concerned about fluctuation  Reports leakage of proteins, has seen nephrology  Was told due to DM    Last A1c 6.5% in [place]----> 5.9%    Patient has a PMH of Type 2 DM, hypertension, hyperlipidemia, CAD, hypothyroidism     Diagnosed with Diabetes Mellitus type 2 > 15 yrs. Course has been variable . Microvascular complications: No known retinopathy (Last eye exam: 2019 )   No  Nephropathy   No Peripheral neuropathy    Home regimen:  Janumet  mg ER 2 tab daily    Previous medications: Glucophage XR  Allergic to metformin. Farxiga ( yeast infection )   Victoza ( rash )     Blood glucose trend  118-166    Mild, controlled      Diet: Eats 3 meals/day. Nutrition education: Yes  Exercise: Walks       Hyperlipidemia: She is on rosuvastatin 40 mg daily, Repatha 160 mg SQ every 2 weeks. Hypertension: She is on Losartan 50 mg daily.      ROS: Scanned, reviewed    Past Medical History:   Diagnosis Date    [facility]     Anemia     CAD (coronary artery disease) 2009    Cholelithiasis 4/0824    Complication of anesthesia     Long time to wake up    Diabetes mellitus ([address]     GERD (gastroesophageal reflux disease)     Hyperlipidemia     Hypertension     MI (myocardial infarction) ([facility] 2004    Nausea & vomiting     ARACELIS (obstructive sleep apnea)     does not use CPAP  (has had some recent weight loss)    PONV (postoperative nausea and vomiting)     Primary osteoarthritis of right hip 6/7/2018    Prolonged emergence from general anesthesia     Seasonal allergies      Past Surgical History:   Procedure Laterality Date    CHOLECYSTECTOMY, LAPAROSCOPIC  9/7/11    laproscopic cholecystectomy with intraoperative cholaniogram    CORONARY ANGIOPLASTY WITH STENT PLACEMENT  1/2009    EYE SURGERY Bilateral     cataract    HAND SURGERY      [employer] Thumb Surgery    HIP ARTHROPLASTY Right 06/28/2018    HIP SURGERY      pin    HYSTERECTOMY      INSERTABLE CARDIAC MONITOR  01/13/2020    LOOP RECORDER    JOINT REPLACEMENT Right 06/28/2018    RT    OTHER SURGICAL HISTORY Right 8.8.15    right hip pinning    ROTATOR CUFF REPAIR      Right     Current Outpatient Medications   Medication Sig Dispense Refill    JANUMET XR  MG TB24 per extended release tablet Take 2 tablets by mouth daily 180 tablet 3    aspirin 81 MG chewable tablet Take 81 mg by mouth daily      ammonium lactate (AMLACTIN) 12 % cream       rosuvastatin (CRESTOR) 40 MG tablet Take 40 mg by mouth every evening      ranolazine (RANEXA) 500 MG extended release tablet Take 1 tablet by mouth 2 times daily 20 tablet 0    Fluticasone Propionate (FLONASE NA) by Nasal route      losartan (COZAAR) 50 MG tablet Take 1 tablet by mouth daily 30 tablet 5    Evolocumab (REPATHA SC) Inject 140 mg into the skin      apixaban (ELIQUIS) 5 MG TABS tablet Take 1 tablet by mouth 2 times daily 180 tablet 3    albuterol sulfate  (90 Base) MCG/ACT inhaler Inhale 2 puffs into the lungs every 6 hours as needed for Wheezing      potassium chloride SA (KLOR-CON M20) 20 MEQ tablet TAKE 1 TABLET DAILY 90 tablet 1    montelukast (SINGULAIR) 10 MG tablet TAKE 1 TABLET NIGHTLY (Patient taking differently: Take 10 mg by mouth daily TAKE 1 TABLET NIGHTLY) 90 tablet 1    levothyroxine (SYNTHROID) 25 MCG tablet Take 25 mcg by mouth daily      Magnesium 400 MG CAPS Take 1 tablet by mouth daily. No current facility-administered medications for this visit. There were no vitals filed for this visit.     Constitutional: Well-developed, appears stated age, cooperative, in no acute distress  H/E/N/M/T:atraumatic, normocephalic, external ears, nose, lips normal without lesions  Eyes: Lids, lashes, conjunctivae and sclerae normal, No proptosis, no redness  Neck: supple, symmetrical, no swelling  Skin: No obvious rashes or lesions present. Skin and hair texture normal  Psychiatric: Judgement and Insight:  judgement and insight appear normal  Neuro: Normal without focal findings, speech is normal normal, speech is spontaneous  Chest: No labored breathing, no chest deformity, no stridor  Musculoskeletal: No joint deformity, swelling      Labs    Lab Results   Component Value Date    LABA1C 6.6 07/23/2020     Lab Results   Component Value Date     08/05/2020    K 4.3 08/05/2020    K 4.3 06/29/2018     01/13/2020    CO2 26 01/13/2020    ANIONGAP 11 01/13/2020    GLUCOSE 134 08/05/2020    BUN 16 01/13/2020    CREATININE 95 08/05/2020    LABGLOM >60 01/13/2020    GFRAA >60 01/13/2020    GFRAA >60 01/25/2013    CALCIUM 9.5 08/05/2020    PROT 6.1 08/08/2015    PROT 7.2 01/25/2013    LABALBU 4.6 06/19/2018    AGRATIO 1.9 08/08/2015    BILITOT 0.6 08/05/2020    ALKPHOS 57 08/05/2020    ALT 20 08/05/2020    AST 20 08/05/2020    GLOB 2.1 08/08/2015     Lab Results   Component Value Date    CHOL 146 08/05/2020    TRIG 192 08/05/2020    HDL 38 08/05/2020    LDLCALC 70 08/05/2020    LABVLDL 49 12/19/2012         Assessment/Plan    1. Type 2 DM     Melody Jurado is a 72 y.o. female has Type 2 DM     Discussed with the patient that her A1c is acceptable. Counseled on target and goals    She wants to go off [address] working well and A1c at goal  Given her concern , discussed glipizide or insulin    She wants to avoid insulin    Inquiring about rybelsus, advised she had a rash with victoza and there can be cross reactivity    She wants to try, aware of risk    She could not tolerate [facility] and wants to avoid other SGLT-2 inhibitors. Could not tolerate victoza due to rash. Advised follow-up with the ophthalmologist once year.   Check Urine microalbumin/cr ratio Discussed foot care. Follows with podiatrist.     2. Hypertension. BP at goal.    3. Hyperlipidemia. On rosuvastatin and Repatha. Managed by cardiology     4. CAD . As per cardiology . PCI    5. Hypothyroidism . On levothyroxine.  Check TSH

## 2021-08-25 ENCOUNTER — NURSE ONLY (OUTPATIENT)
Dept: CARDIOLOGY CLINIC | Age: 66
End: 2021-08-25
Payer: MEDICARE

## 2021-08-25 DIAGNOSIS — Z45.09 ENCOUNTER FOR LOOP RECORDER CHECK: ICD-10-CM

## 2021-08-25 DIAGNOSIS — I48.91 ATRIAL FIBRILLATION, UNSPECIFIED TYPE (HCC): ICD-10-CM

## 2021-08-27 PROCEDURE — G2066 INTER DEVC REMOTE 30D: HCPCS | Performed by: INTERNAL MEDICINE

## 2021-08-27 PROCEDURE — 93298 REM INTERROG DEV EVAL SCRMS: CPT | Performed by: INTERNAL MEDICINE

## 2021-08-27 NOTE — PROGRESS NOTES
Remote transmission received for patients ILR.   EP physician will review.  See interrogation under the cardiology tab in the [address] [place] [address] [address] field for more details.  Will continue to monitor remotely. ILR to monitor for AF. (hx PAF-oac). No AF recorded to date.   No new arrhythmias/events recorded.

## 2021-09-29 ENCOUNTER — NURSE ONLY (OUTPATIENT)
Dept: CARDIOLOGY CLINIC | Age: 66
End: 2021-09-29
Payer: MEDICARE

## 2021-09-29 DIAGNOSIS — I48.91 ATRIAL FIBRILLATION, UNSPECIFIED TYPE (HCC): ICD-10-CM

## 2021-09-29 DIAGNOSIS — Z45.09 ENCOUNTER FOR LOOP RECORDER CHECK: ICD-10-CM

## 2021-10-01 PROCEDURE — G2066 INTER DEVC REMOTE 30D: HCPCS | Performed by: INTERNAL MEDICINE

## 2021-10-01 PROCEDURE — 93298 REM INTERROG DEV EVAL SCRMS: CPT | Performed by: INTERNAL MEDICINE

## 2021-10-01 NOTE — PROGRESS NOTES
Remote transmission received for patients ILR.    EP physician will review.  See interrogation under the cardiology tab in the [address] [place] [address] [address] field for more details.  Will continue to monitor remotely. ILR to monitor for AF. (hx PAF-oac). No AF recorded to date.   No new arrhythmias/events recorded.

## 2021-10-11 ENCOUNTER — TELEPHONE (OUTPATIENT)
Dept: ENDOCRINOLOGY | Age: 66
End: 2021-10-11

## 2021-10-11 RX ORDER — ORAL SEMAGLUTIDE 3 MG/1
TABLET ORAL
Qty: 30 TABLET | Refills: 0 | Status: SHIPPED | OUTPATIENT
Start: 2021-10-11

## 2021-10-11 NOTE — TELEPHONE ENCOUNTER
Patient needs a refill of her Semaglutide (RYBELSUS) 3 MG TABS she says she is doing good on the medicine and would like to r/s her appt until February since she will be out of town. She would like to know if that is okay with Dr. Talita Huitron.               [address] [place] [address] [address] [place] [address], [address] [place] [address] [phone] - F [phone]   [address] [place] [address], [address] [address]   Phone:  [phone]  Fax:  [phone]

## 2021-10-14 DIAGNOSIS — E11.9 TYPE 2 DIABETES MELLITUS WITHOUT COMPLICATION, WITHOUT LONG-TERM CURRENT USE OF INSULIN (HCC): Primary | ICD-10-CM

## 2021-10-14 RX ORDER — ORAL SEMAGLUTIDE 7 MG/1
TABLET ORAL
Qty: 90 TABLET | Refills: 0 | Status: SHIPPED | OUTPATIENT
Start: 2021-10-14 | End: 2021-10-20

## 2021-10-14 NOTE — TELEPHONE ENCOUNTER
Requested Prescriptions     Pending Prescriptions Disp Refills    Semaglutide (RYBELSUS) 7 MG TABS 90 tablet 0     Sig: One tab daily- start after taking 3mg         Please send to [facility] and care sheela

## 2021-10-20 ENCOUNTER — TELEPHONE (OUTPATIENT)
Dept: ENDOCRINOLOGY | Age: 66
End: 2021-10-20

## 2021-10-20 DIAGNOSIS — E11.9 TYPE 2 DIABETES MELLITUS WITHOUT COMPLICATION, WITHOUT LONG-TERM CURRENT USE OF INSULIN (HCC): ICD-10-CM

## 2021-10-20 RX ORDER — ORAL SEMAGLUTIDE 7 MG/1
TABLET ORAL
Qty: 90 TABLET | Refills: 0 | Status: SHIPPED | OUTPATIENT
Start: 2021-10-20

## 2021-11-03 ENCOUNTER — NURSE ONLY (OUTPATIENT)
Dept: CARDIOLOGY CLINIC | Age: 66
End: 2021-11-03
Payer: MEDICARE

## 2021-11-03 DIAGNOSIS — Z45.09 ENCOUNTER FOR LOOP RECORDER CHECK: ICD-10-CM

## 2021-11-03 DIAGNOSIS — I48.91 ATRIAL FIBRILLATION, UNSPECIFIED TYPE (HCC): ICD-10-CM

## 2021-11-03 PROCEDURE — G2066 INTER DEVC REMOTE 30D: HCPCS | Performed by: INTERNAL MEDICINE

## 2021-11-03 PROCEDURE — 93298 REM INTERROG DEV EVAL SCRMS: CPT | Performed by: INTERNAL MEDICINE

## 2021-11-04 NOTE — PROGRESS NOTES
Remote transmission received for patients ILR.    EP physician will review.  See interrogation under the cardiology tab in the [address] [facility] [address] field for more details.  Will continue to monitor remotely. ILR to monitor for AF. (hx PAF-oac). No AF recorded to date.   1 tachycardia event shows PAT x 7 sec. No symptom activated events.

## 2021-12-08 ENCOUNTER — NURSE ONLY (OUTPATIENT)
Dept: CARDIOLOGY CLINIC | Age: 66
End: 2021-12-08
Payer: MEDICARE

## 2021-12-08 DIAGNOSIS — Z45.09 ENCOUNTER FOR LOOP RECORDER CHECK: ICD-10-CM

## 2021-12-08 DIAGNOSIS — I48.91 ATRIAL FIBRILLATION, UNSPECIFIED TYPE (HCC): ICD-10-CM

## 2021-12-09 PROCEDURE — G2066 INTER DEVC REMOTE 30D: HCPCS | Performed by: INTERNAL MEDICINE

## 2021-12-09 PROCEDURE — 93298 REM INTERROG DEV EVAL SCRMS: CPT | Performed by: INTERNAL MEDICINE

## 2021-12-09 NOTE — PROGRESS NOTES
Remote transmission received for patients ILR.    EP physician will review.  See interrogation under the cardiology tab in the [address] [facility] [address] field for more details.  Will continue to monitor remotely. ILR to monitor for AF. (hx PAF-oac). No AF recorded to date.   1 tachycardia event shows PAT x 6 sec. No symptom activated events.

## 2022-01-12 ENCOUNTER — NURSE ONLY (OUTPATIENT)
Dept: CARDIOLOGY CLINIC | Age: 67
End: 2022-01-12
Payer: MEDICARE

## 2022-01-12 DIAGNOSIS — I48.91 ATRIAL FIBRILLATION, UNSPECIFIED TYPE (HCC): ICD-10-CM

## 2022-01-12 DIAGNOSIS — Z45.09 ENCOUNTER FOR LOOP RECORDER CHECK: ICD-10-CM

## 2022-01-14 PROCEDURE — 93298 REM INTERROG DEV EVAL SCRMS: CPT | Performed by: INTERNAL MEDICINE

## 2022-01-14 PROCEDURE — G2066 INTER DEVC REMOTE 30D: HCPCS | Performed by: INTERNAL MEDICINE

## 2022-01-14 NOTE — PROGRESS NOTES
Remote transmission received for patients ILR.    EP physician will review.  See interrogation under the cardiology tab in the [address] [place] [address] [address] field for more details.  Will continue to monitor remotely. ILR to monitor for AF. (hx PAF-oac). No AF recorded to date.   No new arrhythmias/events recorded.

## 2022-02-16 ENCOUNTER — NURSE ONLY (OUTPATIENT)
Dept: CARDIOLOGY CLINIC | Age: 67
End: 2022-02-16
Payer: MEDICARE

## 2022-02-16 DIAGNOSIS — I48.91 ATRIAL FIBRILLATION, UNSPECIFIED TYPE (HCC): ICD-10-CM

## 2022-02-16 DIAGNOSIS — Z45.09 ENCOUNTER FOR LOOP RECORDER CHECK: ICD-10-CM

## 2022-02-16 PROCEDURE — G2066 INTER DEVC REMOTE 30D: HCPCS | Performed by: INTERNAL MEDICINE

## 2022-02-16 PROCEDURE — 93298 REM INTERROG DEV EVAL SCRMS: CPT | Performed by: INTERNAL MEDICINE

## 2022-02-16 NOTE — PROGRESS NOTES
Remote transmission received for patients ILR. EP physician will review. See interrogation under the cardiology tab in the [address] [place] [address] [address] field for more details. Will continue to monitor remotely. ILR to monitor for AF. (hx PAF-oac). No AF recorded to date.   No new arrhythmias/events recorded.

## 2022-03-08 ENCOUNTER — TELEPHONE (OUTPATIENT)
Dept: PULMONOLOGY | Age: 67
End: 2022-03-08

## 2022-03-08 NOTE — TELEPHONE ENCOUNTER
Pt called stating that she would like to schedule a VV with Dr. Tram Degroot to determine if she needs a new sleep study. Pt is currently in [place], so can't schedule at this time. Pt states that she got oral appliance from Dr. Anita Barraza and she wants to know if she still has sleep apnea and if she should follow up with Dr. Anita Barraza to get the oral appliance adjusted. VV 12/7/20:    Assessment:       · Severe ARACELIS. Failed CPAP. Well-controlled with oral appliances. No significant desaturation on most recent HST 11/12/2020. · Nocturnal hypoxemia-secondary to ARACELIS. Not significant on repeat HST  · PVC, Idioventricular rhythm and Afib on Sotalol followed by cardiology      Plan:       · Continue with continue oral appliances  · DC O2  · Continue follow-up with Dr. Anita Barraza   · Follow-up with me as needed  · Sleep hygiene  · Avoid sedatives, alcohol and caffeinated drinks at bed time.   · No driving motorized vehicles or operating heavy machinery while fatigue, drowsy or sleepy.

## 2022-03-17 NOTE — PROGRESS NOTES
Remote transmission received for patients ILR. EP physician will review. See interrogation under the cardiology tab in the [address] [place] [address] [address] field for more details. Will continue to monitor remotely. ILR to monitor for AF. (hx PAF-oac). No AF recorded to date.   No new arrhythmias/events recorded.

## 2022-03-20 PROCEDURE — 93298 REM INTERROG DEV EVAL SCRMS: CPT | Performed by: INTERNAL MEDICINE

## 2022-03-20 PROCEDURE — G2066 INTER DEVC REMOTE 30D: HCPCS | Performed by: INTERNAL MEDICINE

## 2022-03-23 ENCOUNTER — NURSE ONLY (OUTPATIENT)
Dept: CARDIOLOGY CLINIC | Age: 67
End: 2022-03-23
Payer: MEDICARE

## 2022-03-23 DIAGNOSIS — I48.91 ATRIAL FIBRILLATION, UNSPECIFIED TYPE (HCC): ICD-10-CM

## 2022-03-23 DIAGNOSIS — I49.3 PREMATURE VENTRICULAR BEAT: ICD-10-CM

## 2022-03-23 DIAGNOSIS — Z45.09 ENCOUNTER FOR LOOP RECORDER CHECK: ICD-10-CM

## 2022-03-31 NOTE — TELEPHONE ENCOUNTER
Spoke with pt to offer appt in cancellation opening next week but pt states that she will be in [place] until 6/1/22. Scheduled pt for appt with Dr. Pranay Sue 6/13/22.

## 2022-04-27 ENCOUNTER — NURSE ONLY (OUTPATIENT)
Dept: CARDIOLOGY CLINIC | Age: 67
End: 2022-04-27
Payer: MEDICARE

## 2022-04-27 DIAGNOSIS — I48.91 ATRIAL FIBRILLATION, UNSPECIFIED TYPE (HCC): ICD-10-CM

## 2022-04-27 DIAGNOSIS — Z45.09 ENCOUNTER FOR LOOP RECORDER CHECK: ICD-10-CM

## 2022-04-27 DIAGNOSIS — I49.3 PREMATURE VENTRICULAR BEAT: ICD-10-CM

## 2022-04-27 PROCEDURE — 93298 REM INTERROG DEV EVAL SCRMS: CPT | Performed by: INTERNAL MEDICINE

## 2022-04-27 PROCEDURE — G2066 INTER DEVC REMOTE 30D: HCPCS | Performed by: INTERNAL MEDICINE

## 2022-04-27 NOTE — PROGRESS NOTES
Remote interrogation of implanted cardiac event monitor shows normal function. Patient has a history of PVCs and AF. Takes Eliquis. Patient's last device interrogation was on 3/23. Since last interrogation, no arrhythmias recorded. LOUISE CHUN to review. See Paceart report under the Cardiology tab. Follow up 1 month via [facility].

## 2022-05-11 ENCOUNTER — TELEPHONE (OUTPATIENT)
Dept: CARDIOLOGY CLINIC | Age: 67
End: 2022-05-11

## 2022-05-11 NOTE — TELEPHONE ENCOUNTER
Pt is scheduled with guillaumek on 07/26 for a yearly ov. Pt stated that she wanted to know agk's opinion on removing the loop recorder. Please advise.

## 2022-06-01 ENCOUNTER — NURSE ONLY (OUTPATIENT)
Dept: CARDIOLOGY CLINIC | Age: 67
End: 2022-06-01
Payer: MEDICARE

## 2022-06-01 DIAGNOSIS — Z45.09 ENCOUNTER FOR LOOP RECORDER CHECK: ICD-10-CM

## 2022-06-01 DIAGNOSIS — I48.91 ATRIAL FIBRILLATION, UNSPECIFIED TYPE (HCC): ICD-10-CM

## 2022-06-01 NOTE — PROGRESS NOTES
Remote transmission received for patients ILR. EP physician will review. See interrogation under the cardiology tab in the [address] [place] [address] [address] field for more details. Will continue to monitor remotely. ILR to monitor for AF. (hx PAF-oac). No AF recorded to date.     No new arrhythmias/events recorded.

## 2022-06-06 PROCEDURE — G2066 INTER DEVC REMOTE 30D: HCPCS | Performed by: INTERNAL MEDICINE

## 2022-06-06 PROCEDURE — 93298 REM INTERROG DEV EVAL SCRMS: CPT | Performed by: INTERNAL MEDICINE

## 2022-06-10 ENCOUNTER — TELEPHONE (OUTPATIENT)
Dept: PULMONOLOGY | Age: 67
End: 2022-06-10

## 2022-06-10 NOTE — TELEPHONE ENCOUNTER
Patient cancelled appointment on 6/13/22 with Dr. Jannette Lira for to see if sleep study is still needed. Reason: pt has completed home sleep study thru Dr. Ian Alvarez. Patient did not reschedule appointment. Appointment rescheduled for na. Last OV      Assessment:       · Severe ARACELIS. Failed CPAP. Well-controlled with oral appliances. No significant desaturation on most recent HST 11/12/2020. · Nocturnal hypoxemia-secondary to ARACELIS. Not significant on repeat HST  · PVC, Idioventricular rhythm and Afib on Sotalol followed by cardiology      Plan:       · Continue with continue oral appliances  · DC O2  · Continue follow-up with Dr. Ian Alvarez   · Follow-up with me as needed  · Sleep hygiene  · Avoid sedatives, alcohol and caffeinated drinks at bed time.   · No driving motorized vehicles or operating heavy machinery while fatigue, drowsy or sleepy.

## 2022-07-05 NOTE — PROGRESS NOTES
End of 31-day monitoring period 7/6/22. Remote transmission received for patients ILR. EP physician will review. See interrogation under the cardiology tab in the [address] [facility] [address] field for more details. Will continue to monitor remotely. ILR to monitor for AF. (hx PAF-oac). No AF recorded to date.     No new arrhythmias/events recorded.

## 2022-07-06 ENCOUNTER — NURSE ONLY (OUTPATIENT)
Dept: CARDIOLOGY CLINIC | Age: 67
End: 2022-07-06

## 2022-07-06 DIAGNOSIS — Z45.09 ENCOUNTER FOR LOOP RECORDER CHECK: ICD-10-CM

## 2022-07-26 ENCOUNTER — OFFICE VISIT (OUTPATIENT)
Dept: CARDIOLOGY CLINIC | Age: 67
End: 2022-07-26
Payer: MEDICARE

## 2022-07-26 ENCOUNTER — NURSE ONLY (OUTPATIENT)
Dept: CARDIOLOGY CLINIC | Age: 67
End: 2022-07-26

## 2022-07-26 VITALS
SYSTOLIC BLOOD PRESSURE: 136 MMHG | HEART RATE: 70 BPM | BODY MASS INDEX: 23.07 KG/M2 | WEIGHT: 147 LBS | HEIGHT: 67 IN | OXYGEN SATURATION: 97 % | DIASTOLIC BLOOD PRESSURE: 80 MMHG

## 2022-07-26 DIAGNOSIS — I48.91 ATRIAL FIBRILLATION, UNSPECIFIED TYPE (HCC): Primary | ICD-10-CM

## 2022-07-26 DIAGNOSIS — Z45.09 ENCOUNTER FOR LOOP RECORDER CHECK: ICD-10-CM

## 2022-07-26 PROCEDURE — 1123F ACP DISCUSS/DSCN MKR DOCD: CPT | Performed by: INTERNAL MEDICINE

## 2022-07-26 PROCEDURE — 99214 OFFICE O/P EST MOD 30 MIN: CPT | Performed by: INTERNAL MEDICINE

## 2022-07-26 NOTE — PROGRESS NOTES
Patient comes in for interrogation of their implanted loop recorder. Patient has a history of AF and PVCs. Takes Eliquis. Patient's last device interrogation was on 7/6. Since last interrogation, no arrhythmias recorded- ectopy noted on presenting rhythm. Patient will see Dr. Sravanthi Flaherty today in office. See Paceart report under the Cardiology tab. We will follow the patient remotely.

## 2022-07-26 NOTE — PATIENT INSTRUCTIONS
RECOMMENDATIONS:  Monthly remote device checks. Leave loop recorder in for the time being. Follow up in one year.

## 2022-07-26 NOTE — PROGRESS NOTES
[facility]   Electrophysiology Note      Date: 7/26/22  Patient Name: Shaniqua Crawford  YOB: 1955     Chief Complaint: 1 Year Follow Up, Device Check, and Atrial Fibrillation    Primary Care Physician: MARIA R Shanks CNP     HISTORY OF PRESENT ILLNESS: Shaniqua Crawford is a 77 y.o. female who presents for follow up for Atrial Fibrillation and Loop Recorder management. Medical history significant for CAD, MI,  AFIB, DM, HLD, HTN. She underwent a cardiac catheterization while in [place]. This showed a 100% occlusion of her right coronary artery that has been noted on her previous angiogram in 2003. No intervention was needed at the time as collaterals had formed. She returned to [place] and followed up with Dr. Yessenia Avendano. On 1/13/2020 she underwent loop recorder placement due to recurrent episodes of dizziness and palpitations. Isosorbide was stopped at her visit on 1/27/2020 due to dizziness thought to be related to polypharmacy. Her loop recorder interrogation on 6/2/2020 normal function with no new arrhythmias. At her office visit on 9/1/20 she stated she wears O2 a night. She stated she tried wearing a CPAP but is claustrophobic and cannot tolerate it. She stated she is wearing an oral appliance for her sleep apnea now. She stated she is walking 2 miles 4-5 times a week without issue. During this office visit on 9/1/20 her sotalol was decreased to 20 mg BID and ultimately stopped on 9/3/20. .  On 12/15/20, ECG demonstrates SR with HR 69 BPM. Her device interrogation today 12/15/20 demonstrates no new arrhthymias. Today 12/15/20 she reports she is feeling very well from a cardiac standpoint. She reports she is getting adequate rest at night while wearing her oxygen. She reports due to this, she is much less fatigued during the day. She reports she is taking her Eliquis 5 mg BID as prescribed and tolerates that well. She denies any abnormal bleeding or bruising.  She reports she is not as active due to the cold weather as before. She reports she is going to [place] for the winter soon. Interval History since last office visit: Today, 7/26/2022, ILR demonstrates no events. ECG demonstrates SR (70). She reports that she just returned from [place]. She is planning on permanently relocating to [place]. She is taking her medications as prescribed. Denies recent issues with bleeding or bruising. Patient denies current edema, chest pain, sob, palpitations, dizziness or syncope. Past Medical History:   has a past medical history of A-fib ([facility] [address] Anemia, CAD (coronary artery disease), Cholelithiasis, Complication of anesthesia, Diabetes mellitus ([facility] [address] GERD (gastroesophageal reflux disease), Hyperlipidemia, Hypertension, MI (myocardial infarction) ([facility] [address] Nausea & vomiting, ARACELIS (obstructive sleep apnea), PONV (postoperative nausea and vomiting), Primary osteoarthritis of right hip, Prolonged emergence from general anesthesia, and Seasonal allergies. Past Surgical History:   has a past surgical history that includes Coronary angioplasty with stent (1/2009); Rotator cuff repair; Hand surgery; Hysterectomy; Cholecystectomy, laparoscopic (9/7/11); other surgical history (Right, 8.8.15); hip surgery; eye surgery (Bilateral); joint replacement (Right, 06/28/2018); Hip Arthroplasty (Right, 06/28/2018); and Insertable Cardiac Monitor (01/13/2020). Social History:   reports that she quit smoking about 34 years ago. Her smoking use included cigarettes. She has a 22.50 pack-year smoking history. She has never used smokeless tobacco. She reports that she does not drink alcohol and does not use drugs. Family History: family history includes Heart Disease in her mother; High Blood Pressure in her sister.     Allergies:  Metformin and related, Macrolides and ketolides, Sulfa antibiotics, Symbicort [budesonide-formoterol fumarate], Toprol xl [metoprolol], Biaxin [clarithromycin], Codeine, Pcn systems are reviewed and are negative. Physical Examination:    /80   Pulse 70   Ht 5' 6.5\" (1.689 m)   Wt 147 lb (66.7 kg)   SpO2 97%   BMI 23.37 kg/m²    Constitutional and General Appearance: alert, cooperative, no distress and appears stated age  [de-identified]: PERRL, no cervical lymphadenopathy. No masses palpable. Normal oral mucosa  Respiratory:  Normal excursion and expansion without use of accessory muscles  Resp Auscultation: Normal breath sounds without dullness or wheezing  Cardiovascular: The apical impulse is not displaced  RRR S1S2 w/o M/G/R  Abdomen:  No masses or tenderness  Bowel sounds present  Extremities:   No Cyanosis or Clubbing   Lower extremity edema: No  Skin: Warm and dry  Neurological:  Alert and oriented. Moves all extremities well  No abnormalities of mood, affect, memory, mentation, or behavior are noted    DATA:    ECG 7/26/22: Personally reviewed. ECHO: 6/13/18: Summary   Low normal left ventricle systolic function with an estimated ejection   fraction of 50%. No regional wall motion abnormalities are seen. Normal left ventricular diastolic filling pressure. In comparison to the previous echo done 7/2013, no significant changes      IMPRESSION:    1. Atrial fibrillation, unspecified type ([address]        1. Atrial fibrillation [id number] score 4. Patient is a pleasant 59year old female with a medical history significant for paroxysmal atrial fibrillation on sotalol and apixaban, ischemic cardiomyopathy, and hypertension who presents from home for follow up. No atrial fibrillation noted on her device. Patient would like to stop apixaban but discussed risk of stroke and so patient will continue for now even though she has ILR which will alert us if atrial fibrillation recurs. Discussed decreasing sotalol which is reasonable. We agreed that she can decrease to 20 mg BID. Will likely want to discontinue in future.   Follow up with me in 12/2020.  - Decrease sotalol to 20 mg BID.  - Continue apixaban. - Follow up with me in 12/2020 or PRN. 12/15/2020  Patient presents for follow-up. Her device shows no new arrhythmias. Patient is doing well. No complaints. No signs or symptoms of heart failure. No bleeding issues. No signs or symptoms of heart failure. Follow-up with NP in 1 year or as needed with me with any prior issues.  - Ok to hold sotalol.  - Continue apixaban. - Follow up with EP NP in 1 year unless/until procedure/discussion required or PRN.    7/26/2022  Patient presents for follow-up. She has had no atrial fibrillation on her device. She continues on apixaban without any bleeding issues. She would like to consider removing her loop monitor however convince her to call keep it in place for now. She is planning on moving to [place] however will return to visit her daughter and set up a clinic visit with us at that point. No changes at this point.  - Continue apixaban 5 mg BID.  - Continue remote monitoring.  - Follow up in 1 year. RECOMMENDATIONS:  Monthly remote device checks. Leave loop recorder in for the time being. Follow up in one year. QUALITY MEASURES  1. Tobacco Cessation Counseling: NA  2. Retake of BP if >140/90:   NA  3. Documentation to PCP/referring for new patient:  Sent to PCP at close of office visit  4. CAD patient on anti-platelet: Yes  5. CAD patient on STATIN therapy:  Yes  6. Patient with CHF and aFib on anticoagulation:  No     All questions and concerns were addressed to the patient/family. Alternatives to my treatment were discussed. Francis Lucas RN, am scribing for and in the presence of Dr. Arturo Gil. 07/26/22 3:11 PM   Saumya Gonzalez RN    The scribe's documentation has been prepared under my direction and personally reviewed by me in its entirety.  I confirm that the note above accurately reflects all work, physical examination, the discussion of treatments and procedures, and medical decision making performed by me. Eulogio Ordoñez MD personally performed the services described in this documentation as scribed by nurse in my presence, and is both accurate and complete. Electronically signed by Anabel Garcia MD on 7/26/2022 at 8:50 PM     Dr. Dickson Dubin MD  Electrophysiology  [facility]. [facility] [facility]. [address]. [facility], [address]  Phone: [phone]  Fax: [phone]   7/26/2022     NOTE: This report was transcribed using voice recognition software. Every effort was made to ensure accuracy, however, inadvertent computerized transcription errors may be present.

## 2022-08-10 ENCOUNTER — NURSE ONLY (OUTPATIENT)
Dept: CARDIOLOGY CLINIC | Age: 67
End: 2022-08-10
Payer: MEDICARE

## 2022-08-10 DIAGNOSIS — Z45.09 ENCOUNTER FOR LOOP RECORDER CHECK: ICD-10-CM

## 2022-08-10 DIAGNOSIS — I49.3 PREMATURE VENTRICULAR BEAT: Primary | ICD-10-CM

## 2022-08-10 DIAGNOSIS — I48.91 ATRIAL FIBRILLATION, UNSPECIFIED TYPE (HCC): ICD-10-CM

## 2022-08-10 PROCEDURE — 93298 REM INTERROG DEV EVAL SCRMS: CPT | Performed by: INTERNAL MEDICINE

## 2022-08-10 PROCEDURE — G2066 INTER DEVC REMOTE 30D: HCPCS | Performed by: INTERNAL MEDICINE

## 2022-08-12 ENCOUNTER — OFFICE VISIT (OUTPATIENT)
Dept: CARDIOLOGY CLINIC | Age: 67
End: 2022-08-12
Payer: MEDICARE

## 2022-08-12 VITALS
BODY MASS INDEX: 24.25 KG/M2 | HEART RATE: 75 BPM | DIASTOLIC BLOOD PRESSURE: 65 MMHG | HEIGHT: 67 IN | OXYGEN SATURATION: 97 % | WEIGHT: 154.5 LBS | SYSTOLIC BLOOD PRESSURE: 130 MMHG

## 2022-08-12 DIAGNOSIS — E78.2 MIXED HYPERLIPIDEMIA: ICD-10-CM

## 2022-08-12 DIAGNOSIS — I49.3 PREMATURE VENTRICULAR BEAT: ICD-10-CM

## 2022-08-12 DIAGNOSIS — R00.2 PALPITATIONS: ICD-10-CM

## 2022-08-12 DIAGNOSIS — I25.9 CHRONIC ISCHEMIC HEART DISEASE: ICD-10-CM

## 2022-08-12 DIAGNOSIS — R07.89 OTHER CHEST PAIN: ICD-10-CM

## 2022-08-12 DIAGNOSIS — I10 PRIMARY HYPERTENSION: Primary | ICD-10-CM

## 2022-08-12 PROCEDURE — 1036F TOBACCO NON-USER: CPT | Performed by: INTERNAL MEDICINE

## 2022-08-12 PROCEDURE — G8400 PT W/DXA NO RESULTS DOC: HCPCS | Performed by: INTERNAL MEDICINE

## 2022-08-12 PROCEDURE — 1090F PRES/ABSN URINE INCON ASSESS: CPT | Performed by: INTERNAL MEDICINE

## 2022-08-12 PROCEDURE — 3017F COLORECTAL CA SCREEN DOC REV: CPT | Performed by: INTERNAL MEDICINE

## 2022-08-12 PROCEDURE — 93000 ELECTROCARDIOGRAM COMPLETE: CPT | Performed by: INTERNAL MEDICINE

## 2022-08-12 PROCEDURE — 99214 OFFICE O/P EST MOD 30 MIN: CPT | Performed by: INTERNAL MEDICINE

## 2022-08-12 PROCEDURE — G8420 CALC BMI NORM PARAMETERS: HCPCS | Performed by: INTERNAL MEDICINE

## 2022-08-12 PROCEDURE — G8427 DOCREV CUR MEDS BY ELIG CLIN: HCPCS | Performed by: INTERNAL MEDICINE

## 2022-08-12 PROCEDURE — 1123F ACP DISCUSS/DSCN MKR DOCD: CPT | Performed by: INTERNAL MEDICINE

## 2022-08-12 NOTE — LETTER
[address] Cardiology - [address]  Phone: [phone]  Fax: [phone]    Papa Quiroz MD    August 12, 2022     Britt Nicholas, APRN - [facility]  [address] [facility] [address]    Patient: Florentino Boyd   MR Number: [id number]   YOB: 1955   Date of Visit: 8/12/2022       Dear Britt Nicholas:    Thank you for referring Kyle Burgess to me for evaluation/treatment. Below are the relevant portions of my assessment and plan of care. If you have questions, please do not hesitate to call me. I look forward to following Tyesha along with you.     Sincerely,      Papa Quiroz MD

## 2022-08-12 NOTE — PROGRESS NOTES
[facility] [facility]   Cardiovascular Evaluation    PATIENT: Madiha Painter  DATE: 2022  MRN: [id number]  CSN: [id number]  : 1955    Primary Care Doctor: MARIA R Salazar CNP    Reason for evaluation/Chief complaint:   1 Year Follow Up (Overall pt states that she has been doing okay since last seeing Dr. Crow Morales), Hypertension, Hyperlipidemia, and Other (Chronic Ischemic Heart Disease)       Subjective:    History of present illness on initial date of evaluation:    Madiha Painter is a 77 y.o. female who presents for follow up. Today she states she is doing well. She moves back and forth from [place] to [place]. She recalls when she followed with EP she was stressed into coming into office. She states occasional palpitations. She reports occasional chest pain described as twinge to left side of chest. She is following with Pulmonary for sleep apnea. She is compliant with mouth piece. Patient currently denies any weight gain, edema, chest pain, shortness of breath, dizziness, and syncope. She is taking all medications as prescribed, tolerating well. Patient Active Problem List   Diagnosis    HTN (hypertension)    Hyperlipidemia    Presence of stent in coronary artery    DM (diabetes mellitus) ([facility] [address]    Anxiety and depression    SOB (shortness of breath)    A-fib (HCC)    Anemia    Acute myocardial infarction (HCC)    ARACELIS (obstructive sleep apnea)    Chronic rhinitis    Dizziness    Atherosclerosis of coronary artery    Diabetes mellitus, type 2 (HCC)    Hypothyroidism    Closed right hip fracture (HCC)    Chronic ischemic heart disease    Avascular necrosis of bone of hip, right (HCC)    Primary osteoarthritis of right hip    History of total right hip replacement    Premature ventricular beat    Encounter for loop recorder check    Palpitations    Other chest pain         Cardiac Testing: I have reviewed the findings below.   EKG:  ECHO:   STRESS TEST:  CATH:  BYPASS:  VASCULAR:    Past Medical History:   has a past medical history of A-fib ([facility] [address] Anemia, CAD (coronary artery disease), Cholelithiasis, Complication of anesthesia, Diabetes mellitus ([facility] [address] GERD (gastroesophageal reflux disease), Hyperlipidemia, Hypertension, MI (myocardial infarction) ([facility] [address] Nausea & vomiting, ARACELIS (obstructive sleep apnea), PONV (postoperative nausea and vomiting), Primary osteoarthritis of right hip, Prolonged emergence from general anesthesia, and Seasonal allergies. Surgical History:   has a past surgical history that includes Coronary angioplasty with stent (1/2009); Rotator cuff repair; Hand surgery; Hysterectomy; Cholecystectomy, laparoscopic (9/7/11); other surgical history (Right, 8.8.15); hip surgery; eye surgery (Bilateral); joint replacement (Right, 06/28/2018); Hip Arthroplasty (Right, 06/28/2018); and Insertable Cardiac Monitor (01/13/2020). Social History:   reports that she quit smoking about 34 years ago. Her smoking use included cigarettes. She has a 22.50 pack-year smoking history. She has never used smokeless tobacco. She reports that she does not drink alcohol and does not use drugs. Family History:  No evidence for sudden cardiac death or premature CAD    Medications:  Reviewed and are listed in nursing record. and/or listed below  Outpatient Medications:  Prior to Admission medications    Medication Sig Start Date End Date Taking?  Authorizing Provider   Semaglutide (RYBELSUS) 7 MG TABS One tab daily- start after taking 3mg 10/20/21  Yes Lynn Dias MD   Semaglutide (RYBELSUS) 3 MG TABS One tab PO daily for 30 days then 7mg daily 10/11/21  Yes Porter Avalos MD   furosemide (LASIX) 40 MG tablet 20 mg 6/4/21  Yes Historical Provider, MD   aspirin 81 MG chewable tablet Take 81 mg by mouth daily   Yes Historical Provider, MD   rosuvastatin (CRESTOR) 40 MG tablet Take 20 mg by mouth every evening    Yes Historical Provider, MD   losartan (COZAAR) 50 MG tablet Take 1 tablet by mouth daily 2/19/19  Yes Sharon Wooten MD   Evolocumab (REPATHA SC) Inject 140 mg into the skin   Yes Historical Provider, MD   apixaban (ELIQUIS) 5 MG TABS tablet Take 1 tablet by mouth 2 times daily 7/18/18  Yes Cristy Hopkins MD   albuterol sulfate  (90 Base) MCG/ACT inhaler Inhale 2 puffs into the lungs every 6 hours as needed for Wheezing   Yes Historical Provider, MD   potassium chloride SA (KLOR-CON M20) 20 MEQ tablet TAKE 1 TABLET DAILY 8/16/16  Yes Juan Esquivel MD   levothyroxine (SYNTHROID) 25 MCG tablet Take 25 mcg by mouth daily   Yes Historical Provider, MD   Magnesium 400 MG CAPS Take 1 tablet by mouth daily. Yes Historical Provider, MD   ammonium lactate (AMLACTIN) 12 % cream  7/8/20   Historical Provider, MD   Fluticasone Propionate (FLONASE NA) by Nasal route  Patient not taking: No sig reported    Historical Provider, MD   montelukast (SINGULAIR) 10 MG tablet TAKE 1 TABLET NIGHTLY  Patient taking differently: Take 10 mg by mouth in the morning. TAKE 1 TABLET NIGHTLY. 4/12/16   Juan Esquivel MD       In-patient schedule medications:        Infusion Medications: Allergies:  Metformin and related, Macrolides and ketolides, Sulfa antibiotics, Symbicort [budesonide-formoterol fumarate], Toprol xl [metoprolol], Biaxin [clarithromycin], Codeine, Pcn [penicillins], Vicodin [hydrocodone-acetaminophen], and Victoza [liraglutide]     Review of Systems:   All 14 point review of symptoms completed. Pertinent positives identified in the HPI, all other review of symptoms findings as below.      Review of Systems - History obtained from the patient  General ROS: negative for - chills, fever or night sweats  Psychological ROS: negative for - disorientation or hallucinations  Ophthalmic ROS: negative for - dry eyes, eye pain or loss of vision  ENT ROS: negative for - nasal discharge or sore throat  Allergy and Immunology ROS: negative for - hives or itchy/watery eyes  Hematological and Lymphatic ROS: negative for - jaundice or night sweats  Endocrine ROS: negative for - mood swings or temperature intolerance  Breast ROS: deferred  Respiratory ROS: negative for - hemoptysis or stridor  Cardiovascular ROS: negative for - chest pain, dyspnea on exertion or palpitations  Gastrointestinal ROS: no abdominal pain, change in bowel habits, or black or bloody stools  Genito-Urinary ROS: no dysuria, trouble voiding, or hematuria  Musculoskeletal ROS: negative for - gait disturbance, joint pain or joint stiffness  Neurological ROS: negative for - seizures or speech problems  Dermatological ROS: negative for - rash or skin lesion changes      Physical Examination:    /65   Pulse 75   Ht 5' 6.5\" (1.689 m)   Wt 154 lb 8 oz (70.1 kg)   SpO2 97%   BMI 24.56 kg/m²   /65   Pulse 75   Ht 5' 6.5\" (1.689 m)   Wt 154 lb 8 oz (70.1 kg)   SpO2 97%   BMI 24.56 kg/m²    Weight: 154 lb 8 oz (70.1 kg)     Wt Readings from Last 3 Encounters:   08/12/22 154 lb 8 oz (70.1 kg)   07/26/22 147 lb (66.7 kg)   08/18/21 140 lb 6.4 oz (63.7 kg)     No intake or output data in the 24 hours ending 08/12/22 1541    General Appearance:  Alert, cooperative, no distress, appears stated age   Head:  Normocephalic, without obvious abnormality, atraumatic   Eyes:  PERRL, conjunctiva/corneas clear       Nose: Nares normal, no drainage or sinus tenderness   Throat: Lips, mucosa, and tongue normal   Neck: Supple, symmetrical, trachea midline, no adenopathy, thyroid: not enlarged, symmetric, no tenderness/mass/nodules, no carotid bruit or JVD       Lungs:   Clear to auscultation bilaterally, respirations unlabored   Chest Wall:  No tenderness or deformity   Heart:  Regular rhythm and normal rate; S1, S2 are normal; no murmur noted; no rub or gallop   Abdomen:   Soft, non-tender, bowel sounds active all four quadrants,  no masses, no organomegaly           Extremities: Extremities normal, atraumatic, no cyanosis or edema   Pulses: 2+ and symmetric   Skin: Skin color, texture, turgor normal, no rashes or lesions   Pysch: Normal mood and affect   Neurologic: Normal gross motor and sensory exam.         Labs  No results for input(s): WBC, HGB, HCT, MCV, PLT in the last 72 hours. No results for input(s): CREATININE, BUN, NA, K, CL, CO2 in the last 72 hours. No results for input(s): INR, PROTIME in the last 72 hours. No results for input(s): TROPONINI in the last 72 hours. Invalid input(s): PRO-BNP  No results for input(s): CHOL, LDL, HDL in the last 72 hours. Invalid input(s): TG      Imaging:  I have reviewed the below testing personally and my interpretation is below. EKG:  CXR:      Assessment:  77 y.o. patient with:   Diagnosis Orders   1. Primary hypertension  Echo 2D w doppler w color complete    NM MYOCARDIAL SPECT REST EXERCISE OR RX      2. Mixed hyperlipidemia        3. Premature ventricular beat  EKG 12 lead      4. Palpitations  NM MYOCARDIAL SPECT REST EXERCISE OR RX    EKG 12 lead      5. Other chest pain  Echo 2D w doppler w color complete      6. Chronic ischemic heart disease              Plan:  1. I recommend that the patient continue their currently prescribed medications. Their drug modifiable risk factors appear to be well controlled. I will continue to address the need/dosing of medications in future visits. 2. Order Echocardiogram ~ palpitations, chest pain  3. Order GXT stress myoview ~ palpitations, chest pain  4. Follow up in one year if testing normal.       Scribe's attestation: This note was scribed in the presence of Lisandro Archer by Ariana Rodriguez RN     I, Dr. Parish Bray, personally performed the services described in this documentation, as scribed by the above signed scribe in my presence. It is both accurate and complete to my knowledge.  I agree with the details independently gathered by the clinical support staff, while the remaining scribed note accurately describes my personal service to the patient. Medical Decision Making: The following items were considered in medical decision making:  Independent review of images  Review / order clinical lab tests  Review / order radiology tests  Decision to obtain old records  Review and summation of old records as accessed through [facility] (a summary of my findings in these old records)      Time Based Itemization  A total of 30 minutes was spent on today's patient encounter. If applicable, non-patient-facing activities:  (X)Preparing to see the patient and reviewing records  (X) Individual interpretation of results  ( ) Discussion or coordination of care with other health care professionals  () Ordering of unique tests, medications, or procedures  (X) Documentation within the EHR             All questions and concerns were addressed to the patient/family. Alternatives to my treatment were discussed. The note was completed using EMR. Every effort was made to ensure accuracy; however, inadvertent computerized transcription errors may be present.     Cole Anne MD, [address], [place], [place]  [phone] [facility] office  [phone] [facility]  8/12/2022  3:41 PM

## 2022-08-12 NOTE — PATIENT INSTRUCTIONS
Your provider has ordered testing for further evaluation. An order/prescription has been included in your paper work. To schedule outpatient testing, contact Central Scheduling by calling [address] [place] [address] ([phone]). Plan:  1. I recommend that the patient continue their currently prescribed medications. Their drug modifiable risk factors appear to be well controlled. I will continue to address the need/dosing of medications in future visits. 2. Order Echocardiogram ~ palpitations, chest pain  3. Order GXT stress myoview ~ palpitations, chest pain  4.  Follow up in one year if testing normal.

## 2022-09-14 ENCOUNTER — NURSE ONLY (OUTPATIENT)
Dept: CARDIOLOGY CLINIC | Age: 67
End: 2022-09-14
Payer: MEDICARE

## 2022-09-14 DIAGNOSIS — I48.0 PAROXYSMAL ATRIAL FIBRILLATION (HCC): Primary | ICD-10-CM

## 2022-09-14 DIAGNOSIS — Z45.09 ENCOUNTER FOR LOOP RECORDER CHECK: ICD-10-CM

## 2022-09-14 PROCEDURE — G2066 INTER DEVC REMOTE 30D: HCPCS | Performed by: INTERNAL MEDICINE

## 2022-09-14 PROCEDURE — 93298 REM INTERROG DEV EVAL SCRMS: CPT | Performed by: INTERNAL MEDICINE

## 2022-10-19 ENCOUNTER — NURSE ONLY (OUTPATIENT)
Dept: CARDIOLOGY CLINIC | Age: 67
End: 2022-10-19
Payer: MEDICARE

## 2022-10-19 DIAGNOSIS — I48.91 ATRIAL FIBRILLATION, UNSPECIFIED TYPE (HCC): Primary | ICD-10-CM

## 2022-10-19 DIAGNOSIS — R00.2 PALPITATIONS: ICD-10-CM

## 2022-10-19 DIAGNOSIS — I49.3 PREMATURE VENTRICULAR BEAT: ICD-10-CM

## 2022-10-19 DIAGNOSIS — Z45.09 ENCOUNTER FOR LOOP RECORDER CHECK: ICD-10-CM

## 2022-10-19 PROCEDURE — G2066 INTER DEVC REMOTE 30D: HCPCS | Performed by: INTERNAL MEDICINE

## 2022-10-19 PROCEDURE — 93298 REM INTERROG DEV EVAL SCRMS: CPT | Performed by: INTERNAL MEDICINE

## 2022-10-27 NOTE — PROGRESS NOTES
End of 31-day monitoring period 9/14. SVT and SR w/ freq. ectopy noted. No symptom activated events. Remote transmission received for patients ILR. EP physician will review. See interrogation under the cardiology tab in the [address] [place] [address] [address] field for more details. Will continue to monitor remotely. ILR to monitor for AF. (hx PAF-oac). No AF recorded to date. 33 y/o F with PMH of SVT s/p ablation,  currently 13 weeks gestation presenting with intermittent CP x1 week now constant today. Feels pain in upper left chest, states worse with movement and palpation. Patient also having intermittent HA, currently mild. 31 y/o F with PMH of SVT s/p ablation,  currently 13 weeks gestation presenting with intermittent CP x1 week now constant today. Feels pain in upper left chest, states worse with movement and palpation. Patient also having intermittent HA, currently mild.  No SOB, n/v, abd pain, vaginal bleeding, LE swelling

## 2022-12-02 ENCOUNTER — TELEPHONE (OUTPATIENT)
Dept: CARDIOLOGY CLINIC | Age: 67
End: 2022-12-02

## 2022-12-02 NOTE — TELEPHONE ENCOUNTER
12/02/22-Pt called back in to [facility] to state that [employer] will be sending out a new battery due to the battery being dead, will be about 7-10 days.    [employer] did let the pt know that they received a transmission around 12:00am.

## 2022-12-02 NOTE — TELEPHONE ENCOUNTER
While calling pt to remind of 12/06/22 at home transmission, pt stated that she thought it was hooked up & working. She tested something on the monitor, then stated it was showing an error code. Pt was given the number for [employer] [phone].

## 2022-12-30 ENCOUNTER — TELEPHONE (OUTPATIENT)
Dept: CARDIOLOGY CLINIC | Age: 67
End: 2022-12-30

## 2022-12-30 NOTE — TELEPHONE ENCOUNTER
While calling pt to remind of the 01/03/23 at home transmission as well as confirm if monitor had been received yet. Pt stated that she has called [employer], quite a ways back & had the other monitor ordered. However, it has not came in, & she has not heard from them. She had asked if we could please reach out due to her getting no response on it. Please & thank you.

## 2023-01-04 NOTE — TELEPHONE ENCOUNTER
01/04/23-Tyesha had called in to [facility] to make aware that [employer] had reached out to her. They had stated that they had sent the monitor to her address in [facility]. To which the pt has been residing in [place]. Pt informed [employer] that she will be returning to her [place] address & they stated it will be sent there & expected to arrive on 01/10/23. Pt advised that once transmitter is received & ready to go just to please send a manual transmission.

## 2023-01-12 ENCOUNTER — NURSE ONLY (OUTPATIENT)
Dept: CARDIOLOGY CLINIC | Age: 68
End: 2023-01-12
Payer: MEDICARE

## 2023-01-12 ENCOUNTER — TELEPHONE (OUTPATIENT)
Dept: CARDIOLOGY CLINIC | Age: 68
End: 2023-01-12

## 2023-01-12 DIAGNOSIS — R00.2 PALPITATIONS: Primary | ICD-10-CM

## 2023-01-12 DIAGNOSIS — I48.0 PAROXYSMAL ATRIAL FIBRILLATION (HCC): ICD-10-CM

## 2023-01-12 DIAGNOSIS — Z45.09 ENCOUNTER FOR LOOP RECORDER CHECK: ICD-10-CM

## 2023-01-12 PROCEDURE — G2066 INTER DEVC REMOTE 30D: HCPCS | Performed by: INTERNAL MEDICINE

## 2023-01-12 PROCEDURE — 93298 REM INTERROG DEV EVAL SCRMS: CPT | Performed by: INTERNAL MEDICINE

## 2023-01-12 NOTE — TELEPHONE ENCOUNTER
Pt calling in to make aware that her bedside monitor has been received & transmission has been sent.

## 2023-01-12 NOTE — TELEPHONE ENCOUNTER
Pt would like to make [facility] aware that she will be having the stress test performed while in [place] with Dr. Maninder Pompa.   Pt stated she will bring the results at next OV with [facility].

## 2023-01-13 NOTE — PROGRESS NOTES
End of 31-day monitoring period 1/12/23. PSVT and SR w/ freq. ectopy noted. No symptom activated events. Remote transmission received for patients ILR. EP physician will review. See interrogation under the cardiology tab in the [address] [place] [address] [address] field for more details. Will continue to monitor remotely. ILR to monitor for AF. (hx PAF-oac). No AF recorded to date.

## 2023-02-22 ENCOUNTER — NURSE ONLY (OUTPATIENT)
Dept: CARDIOLOGY CLINIC | Age: 68
End: 2023-02-22
Payer: MEDICARE

## 2023-02-22 DIAGNOSIS — I48.91 ATRIAL FIBRILLATION, UNSPECIFIED TYPE (HCC): Primary | ICD-10-CM

## 2023-02-22 DIAGNOSIS — Z45.09 ENCOUNTER FOR LOOP RECORDER CHECK: ICD-10-CM

## 2023-02-22 PROCEDURE — G2066 INTER DEVC REMOTE 30D: HCPCS | Performed by: INTERNAL MEDICINE

## 2023-02-22 PROCEDURE — 93298 REM INTERROG DEV EVAL SCRMS: CPT | Performed by: INTERNAL MEDICINE

## 2023-02-23 NOTE — PROGRESS NOTES
We received a remote transmission from patient's monitor at home. Remote Linq report shows no arrhythmias. EP physician to review. We will continue to monitor remotely. Implanted for AF management. Pt is on Eliquis. End of 31-day monitoring period 2-22-23.

## 2023-03-29 ENCOUNTER — NURSE ONLY (OUTPATIENT)
Dept: CARDIOLOGY CLINIC | Age: 68
End: 2023-03-29

## 2023-03-29 DIAGNOSIS — Z45.09 ENCOUNTER FOR LOOP RECORDER CHECK: Primary | ICD-10-CM

## 2023-03-29 DIAGNOSIS — R00.2 PALPITATIONS: ICD-10-CM

## 2023-03-29 DIAGNOSIS — I48.0 PAROXYSMAL ATRIAL FIBRILLATION (HCC): ICD-10-CM

## 2023-03-29 NOTE — PROGRESS NOTES
End of 31-day monitoring period 3/29. No arrhythmias/events recorded. Remote transmission received for patients ILR. EP physician will review. See interrogation under the cardiology tab in the [address] [place] [address] [address] field for more details. Will continue to monitor remotely. ILR to monitor for AF. (hx PAF-oac). No AF recorded to date.

## 2023-05-10 ENCOUNTER — NURSE ONLY (OUTPATIENT)
Dept: CARDIOLOGY CLINIC | Age: 68
End: 2023-05-10

## 2023-05-23 NOTE — PROGRESS NOTES
End of 31-day monitoring period 5/24   No arrhythmias/events recorded. Battery RRT as of 5/17/23. Remote transmission received for patients ILR. EP physician will review. See interrogation under the cardiology tab in the [address] [place] [address] [address] field for more details. Will continue to monitor remotely. ILR to monitor for AF. (hx PAF-oac). No AF recorded to date. MESSAGE SENT TO STAFF REGARDING RRT. Yomaira Blum

## 2023-05-24 ENCOUNTER — NURSE ONLY (OUTPATIENT)
Dept: CARDIOLOGY CLINIC | Age: 68
End: 2023-05-24
Payer: MEDICARE

## 2023-05-24 DIAGNOSIS — I49.3 PREMATURE VENTRICULAR BEAT: ICD-10-CM

## 2023-05-24 DIAGNOSIS — Z45.09 ENCOUNTER FOR LOOP RECORDER CHECK: ICD-10-CM

## 2023-05-24 DIAGNOSIS — I48.0 PAROXYSMAL ATRIAL FIBRILLATION (HCC): ICD-10-CM

## 2023-05-24 DIAGNOSIS — R00.2 PALPITATIONS: Primary | ICD-10-CM

## 2023-05-24 PROCEDURE — G2066 INTER DEVC REMOTE 30D: HCPCS | Performed by: INTERNAL MEDICINE

## 2023-05-24 PROCEDURE — 93298 REM INTERROG DEV EVAL SCRMS: CPT | Performed by: INTERNAL MEDICINE

## 2023-05-30 ENCOUNTER — TELEPHONE (OUTPATIENT)
Dept: CARDIOLOGY CLINIC | Age: 68
End: 2023-05-30

## 2023-05-30 NOTE — TELEPHONE ENCOUNTER
----- Message from Khris Duarte MD sent at 5/25/2023  6:45 PM EDT -----  Reviewed. Please see if patient would like to have her ILR replaced.

## 2023-05-30 NOTE — TELEPHONE ENCOUNTER
Patient's ILR reached RRT on 5/17/23 - per [address] please see if she wants it replaced.

## 2023-05-30 NOTE — TELEPHONE ENCOUNTER
Spoke with the patient and she would like to discuss at [facility] [address] [place] [address] 08/22/2023 at 1:30 pm.

## 2023-07-05 ENCOUNTER — NURSE ONLY (OUTPATIENT)
Dept: CARDIOLOGY CLINIC | Age: 68
End: 2023-07-05
Payer: MEDICARE

## 2023-07-05 DIAGNOSIS — R00.2 PALPITATIONS: ICD-10-CM

## 2023-07-05 DIAGNOSIS — Z45.09 ENCOUNTER FOR LOOP RECORDER CHECK: ICD-10-CM

## 2023-07-05 DIAGNOSIS — I48.0 PAROXYSMAL ATRIAL FIBRILLATION (HCC): Primary | ICD-10-CM

## 2023-07-05 PROCEDURE — G2066 INTER DEVC REMOTE 30D: HCPCS | Performed by: INTERNAL MEDICINE

## 2023-07-05 PROCEDURE — 93298 REM INTERROG DEV EVAL SCRMS: CPT | Performed by: INTERNAL MEDICINE

## 2023-11-21 ENCOUNTER — OFFICE VISIT (OUTPATIENT)
Dept: CARDIOLOGY CLINIC | Age: 68
End: 2023-11-21
Payer: MEDICARE

## 2023-11-21 VITALS
WEIGHT: 145.2 LBS | BODY MASS INDEX: 23.33 KG/M2 | OXYGEN SATURATION: 97 % | HEART RATE: 70 BPM | DIASTOLIC BLOOD PRESSURE: 84 MMHG | HEIGHT: 66 IN | SYSTOLIC BLOOD PRESSURE: 140 MMHG

## 2023-11-21 DIAGNOSIS — E78.2 MIXED HYPERLIPIDEMIA: ICD-10-CM

## 2023-11-21 DIAGNOSIS — I10 PRIMARY HYPERTENSION: Primary | ICD-10-CM

## 2023-11-21 DIAGNOSIS — I25.10 ATHEROSCLEROSIS OF NATIVE CORONARY ARTERY OF NATIVE HEART WITHOUT ANGINA PECTORIS: ICD-10-CM

## 2023-11-21 PROCEDURE — 3079F DIAST BP 80-89 MM HG: CPT | Performed by: INTERNAL MEDICINE

## 2023-11-21 PROCEDURE — G8420 CALC BMI NORM PARAMETERS: HCPCS | Performed by: INTERNAL MEDICINE

## 2023-11-21 PROCEDURE — 3077F SYST BP >= 140 MM HG: CPT | Performed by: INTERNAL MEDICINE

## 2023-11-21 PROCEDURE — 1036F TOBACCO NON-USER: CPT | Performed by: INTERNAL MEDICINE

## 2023-11-21 PROCEDURE — G8484 FLU IMMUNIZE NO ADMIN: HCPCS | Performed by: INTERNAL MEDICINE

## 2023-11-21 PROCEDURE — 99214 OFFICE O/P EST MOD 30 MIN: CPT | Performed by: INTERNAL MEDICINE

## 2023-11-21 PROCEDURE — G8427 DOCREV CUR MEDS BY ELIG CLIN: HCPCS | Performed by: INTERNAL MEDICINE

## 2023-11-21 PROCEDURE — 1090F PRES/ABSN URINE INCON ASSESS: CPT | Performed by: INTERNAL MEDICINE

## 2023-11-21 PROCEDURE — G8400 PT W/DXA NO RESULTS DOC: HCPCS | Performed by: INTERNAL MEDICINE

## 2023-11-21 PROCEDURE — 1123F ACP DISCUSS/DSCN MKR DOCD: CPT | Performed by: INTERNAL MEDICINE

## 2023-11-21 PROCEDURE — 3017F COLORECTAL CA SCREEN DOC REV: CPT | Performed by: INTERNAL MEDICINE

## 2023-11-21 RX ORDER — AMLODIPINE BESYLATE 2.5 MG/1
2.5 TABLET ORAL DAILY
COMMUNITY
Start: 2023-11-01

## 2023-11-21 RX ORDER — PROMETHAZINE HYDROCHLORIDE 25 MG/1
25 TABLET ORAL 4 TIMES DAILY PRN
Qty: 20 TABLET | Refills: 0 | Status: SHIPPED | OUTPATIENT
Start: 2023-11-21 | End: 2023-11-28

## 2023-11-21 RX ORDER — EVOLOCUMAB 140 MG/ML
140 INJECTION, SOLUTION SUBCUTANEOUS
Qty: 2 EACH | Refills: 11 | Status: SHIPPED | OUTPATIENT
Start: 2023-11-21

## 2023-11-21 RX ORDER — SOTALOL HYDROCHLORIDE 80 MG/1
TABLET ORAL
COMMUNITY
Start: 2023-11-07

## 2023-11-21 RX ORDER — ROSUVASTATIN CALCIUM 40 MG/1
40 TABLET, COATED ORAL EVERY EVENING
Qty: 90 TABLET | Refills: 3 | Status: SHIPPED | OUTPATIENT
Start: 2023-11-21 | End: 2023-11-21

## 2023-11-21 RX ORDER — SPIRONOLACTONE 25 MG/1
TABLET ORAL
COMMUNITY
Start: 2023-11-01

## 2023-11-21 RX ORDER — RANOLAZINE 500 MG/1
TABLET, EXTENDED RELEASE ORAL
COMMUNITY
Start: 2023-11-07 | End: 2023-11-21 | Stop reason: SINTOL

## 2023-11-21 RX ORDER — ROSUVASTATIN CALCIUM 40 MG/1
40 TABLET, COATED ORAL EVERY EVENING
Qty: 90 TABLET | Refills: 3 | Status: SHIPPED | OUTPATIENT
Start: 2023-11-21

## 2023-11-21 NOTE — PROGRESS NOTES
[address]   Cardiovascular Evaluation    PATIENT: Era Dumont  DATE: 2023  MRN: [id number]  CSN: [id number]  : 1955    Primary Care Doctor: MARIA R Hammer CNP    Reason for evaluation/Chief complaint:   Follow-up, Hypertension, Hyperlipidemia, and Atrial Fibrillation         Subjective:    History of present illness on initial date of evaluation:    Era Dumont is a 76 y.o. female who presents for follow up. She has a medical history significant of CAD, MI,  AFIB, DM, HLD, HTN. She underwent a cardiac catheterization while in [place]. This showed a 100% occlusion of her right coronary artery that has been noted on her previous angiogram in . No intervention was needed at the time as collaterals had formed. She returned to [place] and followed up with Dr. Hilario Stafford. On 2020 she underwent loop recorder placement due to recurrent episodes of dizziness and palpitations. At last OV 2022 stress and echocardiogram was recommended and does not appear obtained. Since last office visit she had a left heart catheterization 23 in [place] which showed % proximal occlusion. No intervention was performed as right to right and right to left collaterals were noted. Medical management. Today she states she is working on moving back to [place]. She reports she has been walking 2 miles. She grew concerned as she started to develop chest pain under left arm. She reports she called EMS. / 100's. She states she passed out. She had nitro dosage x 2. She states pain gradually resolved and was hypotensive. This prompted to [facility] in [place]. She brought BP log [id number]'I systolic BP average. HR 60-70's. She has struggled with nausea correlates this with sotalol and ranexa therapy. She is compliant with sleep apnea maintenance. She is taking amlodipine 2.5 mg daily.      Patient Active Problem List   Diagnosis    HTN (hypertension)    Hyperlipidemia

## 2023-11-21 NOTE — PATIENT INSTRUCTIONS
Plan:  1. Recommend consulting Electrophysiology regarding sotalol (Betapace)  2. Discontinue ranolazine (Ranexa)  3. Follow with PCP MARIA R De León CNP for nausea work-up  4. Continue rosuvastatin (Crestor) 40 mg evening and Repatha 140 mg injection every 14 days. 5. Keep a blood pressure log and heart rate log. Take twice daily and call office in 2 weeks if blood pressure greater than 140/90 consistently.    6.  Follow up in January 2024

## 2023-11-28 ENCOUNTER — OFFICE VISIT (OUTPATIENT)
Dept: CARDIOLOGY CLINIC | Age: 68
End: 2023-11-28
Payer: MEDICARE

## 2023-11-28 ENCOUNTER — TELEPHONE (OUTPATIENT)
Dept: CARDIOLOGY CLINIC | Age: 68
End: 2023-11-28

## 2023-11-28 ENCOUNTER — NURSE ONLY (OUTPATIENT)
Dept: CARDIOLOGY CLINIC | Age: 68
End: 2023-11-28

## 2023-11-28 VITALS
BODY MASS INDEX: 23.37 KG/M2 | DIASTOLIC BLOOD PRESSURE: 72 MMHG | HEART RATE: 82 BPM | OXYGEN SATURATION: 98 % | SYSTOLIC BLOOD PRESSURE: 126 MMHG | HEIGHT: 66 IN | WEIGHT: 145.4 LBS

## 2023-11-28 DIAGNOSIS — I48.0 PAROXYSMAL ATRIAL FIBRILLATION (HCC): Primary | ICD-10-CM

## 2023-11-28 DIAGNOSIS — E78.2 MIXED HYPERLIPIDEMIA: Primary | ICD-10-CM

## 2023-11-28 PROCEDURE — 1123F ACP DISCUSS/DSCN MKR DOCD: CPT | Performed by: INTERNAL MEDICINE

## 2023-11-28 PROCEDURE — 99214 OFFICE O/P EST MOD 30 MIN: CPT | Performed by: INTERNAL MEDICINE

## 2023-11-28 PROCEDURE — G8427 DOCREV CUR MEDS BY ELIG CLIN: HCPCS | Performed by: INTERNAL MEDICINE

## 2023-11-28 PROCEDURE — G8484 FLU IMMUNIZE NO ADMIN: HCPCS | Performed by: INTERNAL MEDICINE

## 2023-11-28 PROCEDURE — 3078F DIAST BP <80 MM HG: CPT | Performed by: INTERNAL MEDICINE

## 2023-11-28 PROCEDURE — G8400 PT W/DXA NO RESULTS DOC: HCPCS | Performed by: INTERNAL MEDICINE

## 2023-11-28 PROCEDURE — 1090F PRES/ABSN URINE INCON ASSESS: CPT | Performed by: INTERNAL MEDICINE

## 2023-11-28 PROCEDURE — 3074F SYST BP LT 130 MM HG: CPT | Performed by: INTERNAL MEDICINE

## 2023-11-28 PROCEDURE — 1036F TOBACCO NON-USER: CPT | Performed by: INTERNAL MEDICINE

## 2023-11-28 PROCEDURE — 3017F COLORECTAL CA SCREEN DOC REV: CPT | Performed by: INTERNAL MEDICINE

## 2023-11-28 PROCEDURE — G8420 CALC BMI NORM PARAMETERS: HCPCS | Performed by: INTERNAL MEDICINE

## 2023-11-28 RX ORDER — EVOLOCUMAB 140 MG/ML
140 INJECTION, SOLUTION SUBCUTANEOUS
Qty: 2 ADJUSTABLE DOSE PRE-FILLED PEN SYRINGE | Refills: 11 | Status: SHIPPED | OUTPATIENT
Start: 2023-11-28

## 2023-11-28 RX ORDER — SOTALOL HYDROCHLORIDE 80 MG/1
40 TABLET ORAL DAILY
Qty: 45 TABLET | Refills: 3 | Status: SHIPPED | OUTPATIENT
Start: 2023-11-28

## 2023-11-28 NOTE — PATIENT INSTRUCTIONS
RECOMMENDATIONS:  She would like to proceed with ILR replacement. We will call you to set this up. Discussed antiarrhythmic medications. Amiodarone (potential side effects), tikosyn (4 day hospital stay), flecainide, or dronedarone. Consider restarting sotalol 40 mg daily. Discussed risks and benefits of catheter ablation. Patient would like to restart sotalol 40 mg daily-- come in for EKG in clinic on Friday. Recheck CBC and BMP, calcium. Follow up in 6 months.

## 2023-11-28 NOTE — PROGRESS NOTES
[facility]   Electrophysiology Note      Date: 11/28/23  Patient Name: Esther Vogel  YOB: 1955     Chief Complaint: Follow-up, Device Check, and Atrial Fibrillation      Primary Care Physician: MARIA R Wick CNP     HISTORY OF PRESENT ILLNESS: Esther Vogel is a 76 y.o. female who presents for follow up for Atrial Fibrillation and Loop Recorder management. Medical history significant for CAD, MI,  AFIB, DM, HLD, HTN. She underwent a cardiac catheterization while in [place]. This showed a 100% occlusion of her right coronary artery that has been noted on her previous angiogram in 2003. No intervention was needed at the time as collaterals had formed. She returned to [place] and followed up with Dr. Esequiel Chowdhury. On 1/13/2020 she underwent loop recorder placement due to recurrent episodes of dizziness and palpitations. Isosorbide was stopped at her visit on 1/27/2020 due to dizziness thought to be related to polypharmacy. Her loop recorder interrogation on 6/2/2020 normal function with no new arrhythmias. At her office visit on 9/1/20 she stated she wears O2 a night. She stated she tried wearing a CPAP but is claustrophobic and cannot tolerate it. She stated she is wearing an oral appliance for her sleep apnea now. She stated she is walking 2 miles 4-5 times a week without issue. During this office visit on 9/1/20 her sotalol was decreased to 20 mg BID and ultimately stopped on 9/3/20. .  On 12/15/20, ECG demonstrates SR with HR 69 BPM. Her device interrogation today 12/15/20 demonstrates no new arrhthymias. Today 12/15/20 she reports she is feeling very well from a cardiac standpoint. She reports she is getting adequate rest at night while wearing her oxygen. She reports due to this, she is much less fatigued during the day. She reports she is taking her Eliquis 5 mg BID as prescribed and tolerates that well. She denies any abnormal bleeding or bruising.  She reports she

## 2023-11-28 NOTE — TELEPHONE ENCOUNTER
Medication is pending in encounter for OV note today, 11/28/2023- spoke with AGK and he is going to sign off so medication will be sent.

## 2023-11-28 NOTE — TELEPHONE ENCOUNTER
Pt states she saw AGK this morning and sotalol was suppose to be called in for her but the pharmacy has not yet received it. Pt would like this sent to [facility] in [facility] as soon as possible so she doesn't have to make two trips. Please advise.

## 2023-11-28 NOTE — TELEPHONE ENCOUNTER
Pt came into office to [facility] pt is requesting that her repatha be the sure click pen instead what was ordered.      Last ov 11/21/2023

## 2023-11-29 ENCOUNTER — TELEPHONE (OUTPATIENT)
Dept: CARDIOLOGY CLINIC | Age: 68
End: 2023-11-29

## 2023-11-29 NOTE — TELEPHONE ENCOUNTER
Pt stated that she saw agk on 11/28 and they discussed replacing loop recorder. Per magalis ov note:    She would like to proceed with ILR replacement. We will call you to set this up.

## 2023-11-30 ENCOUNTER — TELEPHONE (OUTPATIENT)
Dept: CARDIOLOGY CLINIC | Age: 68
End: 2023-11-30

## 2023-11-30 NOTE — TELEPHONE ENCOUNTER
[facility]  EP Procedure Sheet    [unfilled]  Saray   1955  [id number]  EP Procedures  [] Pacemaker implant (single/dual) [] EP Study   [] ICD implant (single/dual) [] Atrial flutter ablation (NICHOLE Y/N)   [] Biv implant ICD [] Tilt Table   [] Biv implant PPM [] Atrial fibrillation ablation (NICHOLE Yes)   [] Generator Change (PPM/ICD/BiV) [] SVT ablation   [] Lead revision (RV/LA/RA) (<1 month) [] PVC ablation     [] Lead extraction +/- upgrade (BiV/PPM/ICD) [] VT Ischemic/ non-ischemic   [x] Loop implant/ removal  replacement [] VT RVOT   [] Cardioversion [] VT Left sided   [] NICHOLE [] AVN ablation   Equipment  [x] Medtronic  [] FLORENCE Mapping System    [] Precision [] Ensite X   [] Aldo Santiago [] [address]   [] Elli [] CryoAblation   [] Biotronik [] Laser Lead Extraction   EP Procedures Scheduling Request  # hours Requested  [x]1 []2 []2-4 [] 4-6 Scheduled  Date:   Specific Day  Completed    Anesthesia []yes [x]no F/u Date:   CT surgery backup []yes [x]no Location []FF[x]AND   Overnight stay   []KW[]Sang Butts MD []RMM []MXA []MW  []UL []CMV  []WW [] [address]   []KA [] JMB [x] AJK  First vs repeat   []1st [] 2nd [] 3rd   Pre-Procedure Labs / Imaging  [] PT/INR [] Type & cross   [] CBC [] Units PRBC   [] BMP/Mg [] Units FFP   [] Venogram [] Cardiac CTA for Pulmonary vein mapping     RN INITIALS: SC    Patient Instructions  Dx:  ICD-10 code: PAF  Medication Instructions: Hold []Xarelto []Eliquis []Coumadin []pradaxa for do not hold  Do not eat or drink after midnight the night prior to procedure [] Yes [x] No    Hold lasix morning of. No other medications to hold.

## 2023-11-30 NOTE — TELEPHONE ENCOUNTER
Procedure:  Loop Explant/Implant  Doctor:  Dr. Juan Luis Mercado  Date:  12/6/23  Time:  10:30am  Arrival:  9:30am  Reps:  Medtronic  Anesthesia:  n/a      Spoke with patient. Please have patient arrive to the main entrance of [facility] ([address] [place] [address], [address] [place] [address]) and check in with the registration desk. They will be directed to the Cath Lab. Do not apply lotions/creams on skin the day of procedure.

## 2023-12-01 ENCOUNTER — NURSE ONLY (OUTPATIENT)
Dept: CARDIOLOGY CLINIC | Age: 68
End: 2023-12-01
Payer: MEDICARE

## 2023-12-01 ENCOUNTER — TELEPHONE (OUTPATIENT)
Dept: CARDIOLOGY CLINIC | Age: 68
End: 2023-12-01

## 2023-12-01 DIAGNOSIS — I49.3 PREMATURE VENTRICULAR BEAT: Primary | ICD-10-CM

## 2023-12-01 PROCEDURE — 93000 ELECTROCARDIOGRAM COMPLETE: CPT | Performed by: INTERNAL MEDICINE

## 2023-12-01 NOTE — TELEPHONE ENCOUNTER
Spoke with patient and relayed medication instructions, hold lasix morning of the procedure.  She gave V/U.

## 2023-12-06 ENCOUNTER — HOSPITAL ENCOUNTER (OUTPATIENT)
Dept: CARDIAC CATH/INVASIVE PROCEDURES | Age: 68
Discharge: HOME | End: 2023-12-06
Attending: INTERNAL MEDICINE

## 2023-12-07 ENCOUNTER — TELEPHONE (OUTPATIENT)
Dept: CARDIOLOGY CLINIC | Age: 68
End: 2023-12-07

## 2023-12-07 NOTE — TELEPHONE ENCOUNTER
Pt called and stated VSP requested pt to record BP for two weeks. PT called in with readings. 11/22/23: 144/79, 77 8:00AM  11/22/23: 132/78, 76 9:00PM    11/23/23: 137/83, 71 8:00AM  11/23/23: 137/75, 75 9:00PM    11/24/23: 134/81, 73 8:00AM  11/24/23: 127/63, 76 9:00PM    11/25/23: 140/84, 72 8:000AM  11/25/23: 130/67, 70 1:00AM    11/26/23: 133/78, 74 8:15AM  11/26/23: 130/74, 79 10:00PM    11/27/23: 139/84, 75 8:30AM  11/27/23: 133/73, 80 10:00PM    11/28/23: 130/83, 76 8:15AM  11/28/23: 127/76, 73 10:00PM    11/29/23: 130/81, 66 8:15AM  11/29/23: 114/69, 72 10:00PM    11/30/23: 128/83, 69 8:00AM  11/30/23: 117/74, 73 9:45PM    12/01/23:132/81, 74 8:30AM  12/01/23: 116/74, 69 9:00PM    12/02/23: 137/80, 84 8:00AM  12/02/23: 117/74, 73 9:00PM    12/03/23: 138/86, 86 8:30AM  12/03/23: 117/67, 57 10:00PM    12/04/23: 142/89, 66 8:30AM  12/04/23: 136/83, 58 10:00PM    12/05/23: 123/82, 67 8:00AM  12/05/23: 133/79, 65 10:00PM    Pt wants to know if she needs to continue BP readings until her upcoming ov 01/22/24 VSP? Please advise.  Pt [phone]

## 2023-12-07 NOTE — TELEPHONE ENCOUNTER
Blood pressure looks much more stable. She does not need to check anymore. We can follow-up with her in early January as planned.

## 2023-12-13 ENCOUNTER — NURSE ONLY (OUTPATIENT)
Dept: CARDIOLOGY CLINIC | Age: 68
End: 2023-12-13

## 2023-12-13 ENCOUNTER — HOSPITAL ENCOUNTER (OUTPATIENT)
Dept: CARDIAC CATH/INVASIVE PROCEDURES | Age: 68
Discharge: HOME | End: 2023-12-13
Attending: INTERNAL MEDICINE | Admitting: INTERNAL MEDICINE
Payer: MEDICARE

## 2023-12-13 VITALS
HEART RATE: 60 BPM | BODY MASS INDEX: 22.74 KG/M2 | WEIGHT: 143 LBS | SYSTOLIC BLOOD PRESSURE: 122 MMHG | DIASTOLIC BLOOD PRESSURE: 78 MMHG | OXYGEN SATURATION: 93 %

## 2023-12-13 DIAGNOSIS — Z45.09 ENCOUNTER FOR LOOP RECORDER CHECK: ICD-10-CM

## 2023-12-13 PROCEDURE — 33285 INSJ SUBQ CAR RHYTHM MNTR: CPT

## 2023-12-13 PROCEDURE — 33286 RMVL SUBQ CAR RHYTHM MNTR: CPT

## 2023-12-13 PROCEDURE — 2500000003 HC RX 250 WO HCPCS

## 2023-12-13 RX ORDER — SODIUM CHLORIDE 0.9 % (FLUSH) 0.9 %
5-40 SYRINGE (ML) INJECTION EVERY 12 HOURS SCHEDULED
Status: DISCONTINUED | OUTPATIENT
Start: 2023-12-13 | End: 2023-12-13 | Stop reason: HOSPADM

## 2023-12-13 RX ORDER — SODIUM CHLORIDE 9 MG/ML
INJECTION, SOLUTION INTRAVENOUS PRN
Status: DISCONTINUED | OUTPATIENT
Start: 2023-12-13 | End: 2023-12-13 | Stop reason: HOSPADM

## 2023-12-13 RX ORDER — SODIUM CHLORIDE 0.9 % (FLUSH) 0.9 %
5-40 SYRINGE (ML) INJECTION PRN
Status: DISCONTINUED | OUTPATIENT
Start: 2023-12-13 | End: 2023-12-13 | Stop reason: HOSPADM

## 2023-12-13 NOTE — PROCEDURES
[facility]     Electrophysiology Procedure Note       Date of Procedure: 12/13/2023  Patient's Name: Penny Ordonez  YOB: 1955   Medical Record Number: [id number]  Referring Physician: Sherley Colin MD  Procedure Performed by: Kwasi Matthew MD    Procedure performed:    Removal of implantable loop recorder   Anesthesia: Local and Monitored Anesthesia Care  Level of sedation plan: none  Mallampati airway assessment class: II  ASA class: II  (there were no independent trained observers who had no other duties involved in this procedure)    Indications for procedure:      ILR at [facility]    Loop recorder removal:   The patient was brought to the electrophysiology laboratory in stable condition. The patient was in a fasting and non-sedated state. The risks, benefits and alternatives of the procedure were discussed with the patient. The risks including, but not limited to, the risks of bleeding, infection, injury to surrounding structures, were discussed in detail. Patient opted to proceed with the device explantation and reimplantation. Written informed consent was signed and placed in the chart. Prophylactic antibiotic was not necessary and therefore not given. The patient was prepped and draped in a sterile fashion. A timeout protocol was completed to identify the patient and the procedure being performed. Pre-sedation evaluation and airway assessment was completed. IV sedation was not administered. The patient was monitored continuously with ECG, pulse oximetry, blood pressure monitoring, and direct observation. Chest was prepped and draped in the usual sterile fashion. After injection of lidocaine, an incision was made over the previous scar and extended to the pocket using a #11 scalpel. The old loop recorder device was removed under fluoroscopic guidance.     Using previous wound from which previous device was removed the subcutaneous tissues were dissected using the Linq

## 2023-12-13 NOTE — PROGRESS NOTES
CATH LAB PROCEDURE LOG - LOOP RECORDER     PRE Procedure:    ARRIVAL TIME: 0930    Pt arrived to Cath Lab. Plan of Care:   Hemodynamics and cardiac rhythm will remain stable. Comfort level will be maintained. Respiratory function will remain adequate. Pt/family will verbalize understanding of the procedure. Procedure will be tolerated without complications. Patient will recover from procedure without complications. ID armband on patient and identification verified. Informed consent obtained. Non invasive blood pressure cuff applied, monitoring initiated. EKG pads and pulse oximeter applied, monitoring initiated. Instructions given. Patient and / or family verbalize understanding. H&P will be documented by physician in [address] [place] [address]. Pt has been NPO since midnight. Chlorhexidine Gluconate 2% - three minute scrub to left anterior chest.      FOR LOOP EXPLANT:    Timeout and Fire Safety completed at 10:59 AM.     Correct patient verified. Members of the surgical team/visitors introduced. Allergies announced. Correct procedure verified. Correct procedure site verified. Consents verified. Implant equipment, additional services, special requirements available. Medications labeled and available. Appropriate pre medications have been administered. Alcohol prep solution had sufficient time to dissipate if used. Yes=1, No=0  Surgical site or incision above the xyphoid. Yes=1, No=0  Open oxygen source. Yes=1, No=0  Available ignition source. Yes=1, No=0  Score total - 1.     10:55 AM Chest prepped with chloraprep and draped in sterile fashion. 10:59 AM Local anesthetic was administered. 11:01 AM Incision made with chevron scalpel. 11:03 AM Loop recorder located and explanted. FOR LOOP IMPLANT:        11:03 AM Insertion tool placed into incision. 11:04 AM Device deployed. 11:05 AM Incision sutured. 11:06 AM Liquid skin adhesive to incision.    11:05 AM Device

## 2023-12-13 NOTE — DISCHARGE INSTRUCTIONS
FOLLOW-UP APPOINTMENTS    Follow-up appointment on 12/21/2023 at 3:45PM with [facility]. [address] [facility] [address]: [phone]. If you are unable to make this appointment, please call to reschedule [phone]. Please arrive 15 minutes early to complete necessary paperwork. Directions to [facility]  [address] [place]. [address] exit. Right off exit. Cross over [employer]. Right on [address]. Left into hospital. Follow the signs to the emergency room ( turn left toward the Emergency room). Go right at the first stop sign. Just past the Emergency room at the second stop sign turn right and go up the ramp and park on the top level if possible. Go in the glass doors of the [facility] we on the top level of the garage [address]. As soon as you get in the door turn left and our office is the one with the glass doors. Cath Labs at [address] [facility] Insertion Discharge Instructions     12/13/2023  [address]   Date of Birth 1955       You will have a temporary ID card to keep in your wallet until you get a permanent card in the mail in 3 weeks. This is to use  when you go through metal detectors in [facility] or airports or if a provider ask if you have an implantable device. Patient Manual: is used for a reference. If you have any questions you can look at the manual or call the phone number provided on the manual     Patient Activator Remote:   Keep the remote near you at all times. If you have symptoms press the heart button and place the remote over the device. You will hear a beep and see a white checkmark light up beneath the heart button. The remote will turn off on its own. Incision:   Keep site clean and dry until seen in office, remove the outer dressing in 48 hours.   Call your provider for any signs or symptoms of infection such as: fever, drainage or swelling

## 2023-12-14 PROCEDURE — 93298 REM INTERROG DEV EVAL SCRMS: CPT | Performed by: INTERNAL MEDICINE

## 2023-12-14 PROCEDURE — G2066 INTER DEVC REMOTE 30D: HCPCS | Performed by: INTERNAL MEDICINE

## 2023-12-26 ENCOUNTER — NURSE ONLY (OUTPATIENT)
Dept: CARDIOLOGY CLINIC | Age: 68
End: 2023-12-26

## 2023-12-26 NOTE — PATIENT INSTRUCTIONS
Leave incision open to the air; do not apply any dressings, ointments, or bandages to the area. Do not apply lotion, perfume/cologne, or powders to the area until it is completely healed. Any scabbing or skin glue that is noted will fall off within 1-2 weeks after the post op appointment. If any oozing, bleeding, or pus drainage occurs, please call the office immediately at [phone]. Remote Monitoring Instructions    Within 2-3 weeks of your device being implanted, you will receive a call from the  of your device. Please answer this call as it is to set up remote monitoring for your device. Once you receive your in-home monitor, please follow the instructions provided to sync the home monitor to your implanted device. Once you have paired your home monitor to your implanted device, keep your monitor plugged in within 6 feet of where you sleep. Your monitor will routinely check in with your device during sleep hours and transmit any urgent events to the [address] [place] [address] for review. Please do not send additional routine transmissions unless specifically requested by the office staff - the steps to send a manual transmission are the same as when you paired your in-home monitor to your device for the first time. Your device and monitor are wireless and most transmit cellularly, but please periodically check your monitor is still plugged in to the electrical outlet. If you still use the telephone land line to send, please ensure the connection to the phone thiago is secure. This will help to ensure successful automatic transmissions in the future. Please be aware that the remote device transmission sites are periodically monitored only during regular business hours during which simultaneous in-office device clinics are being conducted. If your transmission requires attention, we will contact you as soon as possible.     Your in-home monitor will do a full report on your device every month

## 2023-12-27 ENCOUNTER — TELEPHONE (OUTPATIENT)
Dept: CARDIOLOGY CLINIC | Age: 68
End: 2023-12-27

## 2023-12-27 NOTE — TELEPHONE ENCOUNTER
Pt states she had a site check yesterday and was told to record symptoms in a little blue \"thing\". Pt states she does not have this as she turned in her old one and never received a new one. Pt states she does have an lilli on her phone that she can record her symptoms in. Please advise.

## 2024-01-16 NOTE — PROGRESS NOTES
[facility]   Cardiovascular Evaluation    PATIENT: Tyesha Waterman  DATE: 2024  MRN: [id number]  CSN: [id number]  : 1955    Primary Care Doctor: Isaura Wade APRN - CNP    Reason for evaluation/Chief complaint:   Follow-up, Atrial Fibrillation, Hypertension, and Hyperlipidemia         Subjective:    History of present illness on initial date of evaluation:    Tyesha Waterman is a 68 y.o. female who presents for follow up. She has a medical history significant of CAD, MI,  AFIB, DM, HLD, HTN. She underwent a cardiac catheterization while in [place]. This showed a 100% occlusion of her right coronary artery that has been noted on her previous angiogram in . No intervention was needed at the time as collaterals had formed. She returned to [place] and followed up with Dr. Cutler. On 2020 she underwent loop recorder placement due to recurrent episodes of dizziness and palpitations. At last OV 2022 stress and echocardiogram was recommended and does not appear obtained.    She had a left heart catheterization 23 in [place] which showed % proximal occlusion. No intervention was performed as right to right and right to left collaterals were noted. Medical management.   Since last office visit she has followed with . She proceeded with explant and implant of loop recorder device on 2023. Today she states she is taking sotalol as prescribed. Patient currently denies any weight gain, edema, palpitations, chest pain, shortness of breath, dizziness, and syncope. She reports she would like to proceed potentially with ablation with .     Patient Active Problem List   Diagnosis    HTN (hypertension)    Hyperlipidemia    Presence of stent in coronary artery    DM (diabetes mellitus) (HCC)    Anxiety and depression    SOB (shortness of breath)    A-fib (HCC)    Anemia    Acute myocardial infarction (HCC)    ARACELIS (obstructive sleep apnea)

## 2024-01-18 PROCEDURE — 93298 REM INTERROG DEV EVAL SCRMS: CPT | Performed by: INTERNAL MEDICINE

## 2024-01-22 ENCOUNTER — OFFICE VISIT (OUTPATIENT)
Dept: CARDIOLOGY CLINIC | Age: 69
End: 2024-01-22
Payer: COMMERCIAL

## 2024-01-22 VITALS
DIASTOLIC BLOOD PRESSURE: 76 MMHG | OXYGEN SATURATION: 96 % | HEART RATE: 51 BPM | WEIGHT: 141 LBS | BODY MASS INDEX: 22.66 KG/M2 | HEIGHT: 66 IN | SYSTOLIC BLOOD PRESSURE: 124 MMHG

## 2024-01-22 DIAGNOSIS — I10 PRIMARY HYPERTENSION: Primary | ICD-10-CM

## 2024-01-22 DIAGNOSIS — I25.10 ATHEROSCLEROSIS OF NATIVE CORONARY ARTERY OF NATIVE HEART WITHOUT ANGINA PECTORIS: ICD-10-CM

## 2024-01-22 DIAGNOSIS — E78.2 MIXED HYPERLIPIDEMIA: ICD-10-CM

## 2024-01-22 PROCEDURE — 3074F SYST BP LT 130 MM HG: CPT | Performed by: INTERNAL MEDICINE

## 2024-01-22 PROCEDURE — 99213 OFFICE O/P EST LOW 20 MIN: CPT | Performed by: INTERNAL MEDICINE

## 2024-01-22 PROCEDURE — G8484 FLU IMMUNIZE NO ADMIN: HCPCS | Performed by: INTERNAL MEDICINE

## 2024-01-22 PROCEDURE — 1090F PRES/ABSN URINE INCON ASSESS: CPT | Performed by: INTERNAL MEDICINE

## 2024-01-22 PROCEDURE — 3078F DIAST BP <80 MM HG: CPT | Performed by: INTERNAL MEDICINE

## 2024-01-22 PROCEDURE — 1036F TOBACCO NON-USER: CPT | Performed by: INTERNAL MEDICINE

## 2024-01-22 PROCEDURE — G8427 DOCREV CUR MEDS BY ELIG CLIN: HCPCS | Performed by: INTERNAL MEDICINE

## 2024-01-22 PROCEDURE — 3017F COLORECTAL CA SCREEN DOC REV: CPT | Performed by: INTERNAL MEDICINE

## 2024-01-22 PROCEDURE — G8400 PT W/DXA NO RESULTS DOC: HCPCS | Performed by: INTERNAL MEDICINE

## 2024-01-22 PROCEDURE — G8420 CALC BMI NORM PARAMETERS: HCPCS | Performed by: INTERNAL MEDICINE

## 2024-01-22 PROCEDURE — 1123F ACP DISCUSS/DSCN MKR DOCD: CPT | Performed by: INTERNAL MEDICINE

## 2024-01-22 NOTE — PATIENT INSTRUCTIONS
Plan:  1. I recommend that the patient continue their currently prescribed medications. Their drug modifiable risk factors appear to be well controlled. I will continue to address the need/dosing of medications in future visits.   2. Continue as planned with Electrophysiology with    3. Follow up with me in 6 months

## 2024-01-31 ENCOUNTER — TELEPHONE (OUTPATIENT)
Dept: CARDIOLOGY CLINIC | Age: 69
End: 2024-01-31

## 2024-01-31 NOTE — TELEPHONE ENCOUNTER
Submitted PA for REPATHA  [facility]ia [facility] Key: [id number] STATUS: PENDING.    Follow up done daily; if no decision with in three days we will refax.  If another three days goes by with no decision will call the insurance for status.

## 2024-02-23 PROCEDURE — 93298 REM INTERROG DEV EVAL SCRMS: CPT | Performed by: INTERNAL MEDICINE

## 2024-03-13 ENCOUNTER — HOSPITAL ENCOUNTER (OUTPATIENT)
Age: 69
Discharge: HOME | End: 2024-03-13
Payer: COMMERCIAL

## 2024-03-13 ENCOUNTER — OFFICE VISIT (OUTPATIENT)
Dept: CARDIOLOGY CLINIC | Age: 69
End: 2024-03-13
Payer: MEDICARE

## 2024-03-13 VITALS — OXYGEN SATURATION: 98 % | SYSTOLIC BLOOD PRESSURE: 130 MMHG | HEART RATE: 90 BPM | DIASTOLIC BLOOD PRESSURE: 82 MMHG

## 2024-03-13 DIAGNOSIS — I10 PRIMARY HYPERTENSION: Primary | ICD-10-CM

## 2024-03-13 DIAGNOSIS — I25.10 ATHEROSCLEROSIS OF NATIVE CORONARY ARTERY OF NATIVE HEART WITHOUT ANGINA PECTORIS: ICD-10-CM

## 2024-03-13 DIAGNOSIS — I48.0 PAROXYSMAL ATRIAL FIBRILLATION (HCC): ICD-10-CM

## 2024-03-13 DIAGNOSIS — E78.2 MIXED HYPERLIPIDEMIA: ICD-10-CM

## 2024-03-13 DIAGNOSIS — Z79.899 MEDICATION MANAGEMENT: ICD-10-CM

## 2024-03-13 LAB
ANION GAP SERPL CALCULATED.3IONS-SCNC: 11 MMOL/L (ref 3–16)
BASOPHILS # BLD: 0.1 K/UL (ref 0–0.2)
BASOPHILS NFR BLD: 0.8 %
BUN SERPL-MCNC: 17 MG/DL (ref 7–20)
CALCIUM SERPL-MCNC: 9.4 MG/DL (ref 8.3–10.6)
CHLORIDE SERPL-SCNC: 99 MMOL/L (ref 99–110)
CO2 SERPL-SCNC: 25 MMOL/L (ref 21–32)
CREAT SERPL-MCNC: 0.6 MG/DL (ref 0.6–1.2)
DEPRECATED RDW RBC AUTO: 14.2 % (ref 12.4–15.4)
EOSINOPHIL # BLD: 0.4 K/UL (ref 0–0.6)
EOSINOPHIL NFR BLD: 5.8 %
GFR SERPLBLD CREATININE-BSD FMLA CKD-EPI: >60 ML/MIN/{1.73_M2}
GLUCOSE SERPL-MCNC: 138 MG/DL (ref 70–99)
HCT VFR BLD AUTO: 40.3 % (ref 36–48)
HGB BLD-MCNC: 13.7 G/DL (ref 12–16)
LYMPHOCYTES # BLD: 1.7 K/UL (ref 1–5.1)
LYMPHOCYTES NFR BLD: 23.1 %
MCH RBC QN AUTO: 30.7 PG (ref 26–34)
MCHC RBC AUTO-ENTMCNC: 34.1 G/DL (ref 31–36)
MCV RBC AUTO: 90.3 FL (ref 80–100)
MONOCYTES # BLD: 1.2 K/UL (ref 0–1.3)
MONOCYTES NFR BLD: 16.4 %
NEUTROPHILS # BLD: 3.9 K/UL (ref 1.7–7.7)
NEUTROPHILS NFR BLD: 53.9 %
PLATELET # BLD AUTO: 198 K/UL (ref 135–450)
PMV BLD AUTO: 8.6 FL (ref 5–10.5)
POTASSIUM SERPL-SCNC: 3.8 MMOL/L (ref 3.5–5.1)
RBC # BLD AUTO: 4.46 M/UL (ref 4–5.2)
SODIUM SERPL-SCNC: 135 MMOL/L (ref 136–145)
WBC # BLD AUTO: 7.2 K/UL (ref 4–11)

## 2024-03-13 PROCEDURE — G8484 FLU IMMUNIZE NO ADMIN: HCPCS | Performed by: INTERNAL MEDICINE

## 2024-03-13 PROCEDURE — G8420 CALC BMI NORM PARAMETERS: HCPCS | Performed by: INTERNAL MEDICINE

## 2024-03-13 PROCEDURE — G8400 PT W/DXA NO RESULTS DOC: HCPCS | Performed by: INTERNAL MEDICINE

## 2024-03-13 PROCEDURE — 1123F ACP DISCUSS/DSCN MKR DOCD: CPT | Performed by: INTERNAL MEDICINE

## 2024-03-13 PROCEDURE — 3075F SYST BP GE 130 - 139MM HG: CPT | Performed by: INTERNAL MEDICINE

## 2024-03-13 PROCEDURE — 99214 OFFICE O/P EST MOD 30 MIN: CPT | Performed by: INTERNAL MEDICINE

## 2024-03-13 PROCEDURE — 85025 COMPLETE CBC W/AUTO DIFF WBC: CPT

## 2024-03-13 PROCEDURE — 3017F COLORECTAL CA SCREEN DOC REV: CPT | Performed by: INTERNAL MEDICINE

## 2024-03-13 PROCEDURE — 1036F TOBACCO NON-USER: CPT | Performed by: INTERNAL MEDICINE

## 2024-03-13 PROCEDURE — 3079F DIAST BP 80-89 MM HG: CPT | Performed by: INTERNAL MEDICINE

## 2024-03-13 PROCEDURE — 36415 COLL VENOUS BLD VENIPUNCTURE: CPT

## 2024-03-13 PROCEDURE — G8427 DOCREV CUR MEDS BY ELIG CLIN: HCPCS | Performed by: INTERNAL MEDICINE

## 2024-03-13 PROCEDURE — 1090F PRES/ABSN URINE INCON ASSESS: CPT | Performed by: INTERNAL MEDICINE

## 2024-03-13 PROCEDURE — 80048 BASIC METABOLIC PNL TOTAL CA: CPT

## 2024-03-13 RX ORDER — GUAIFENESIN 600 MG/1
1200 TABLET, EXTENDED RELEASE ORAL 2 TIMES DAILY
COMMUNITY

## 2024-03-13 RX ORDER — FLUTICASONE PROPIONATE 50 MCG
1 SPRAY, SUSPENSION (ML) NASAL DAILY
COMMUNITY

## 2024-03-13 RX ORDER — HYDROCHLOROTHIAZIDE 12.5 MG/1
12.5 CAPSULE, GELATIN COATED ORAL EVERY MORNING
Qty: 90 CAPSULE | Refills: 1 | Status: SHIPPED | OUTPATIENT
Start: 2024-03-13

## 2024-03-13 RX ORDER — CETIRIZINE HYDROCHLORIDE 10 MG/1
10 TABLET ORAL DAILY
COMMUNITY

## 2024-03-13 NOTE — PATIENT INSTRUCTIONS
Plan:  1. Discontinue amlodipine (Norvac) ~ side effect   2. Start hydrochlorothiazide 12.5 mg daily  3. Patient will be started on new medication Hydrochlorothiazide. Patient verbalizes understanding of the need for treatment and education provided at today's visit. Additional education materials will be provided in the AVS.    4. Discontinue furosemide (Lasix)  5. Order BMP in 1-2 weeks from starting hydrochlorothiazide  6. Continue to follow with Electrophysiology as planned  7. Recommend keep blood pressure log. Obtain in upper arm 4-5 times a week and bring log to next office visit. Goal less than 130/80.   8. Follow up in 6 months
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
## 2024-03-15 ENCOUNTER — TELEPHONE (OUTPATIENT)
Dept: CARDIOLOGY CLINIC | Age: 69
End: 2024-03-15

## 2024-03-15 NOTE — TELEPHONE ENCOUNTER
----- Message from ALETHA Montoya Jr., MD sent at 3/15/2024  2:06 PM EDT -----  Reviewed.  Please let patient know CBC and CMP within acceptable limits.  No changes.

## 2024-03-29 PROCEDURE — 93298 REM INTERROG DEV EVAL SCRMS: CPT | Performed by: INTERNAL MEDICINE

## 2024-04-11 LAB
BUN BLDV-MCNC: NORMAL MG/DL
CALCIUM SERPL-MCNC: 93 MG/DL
CHLORIDE BLD-SCNC: 102 MMOL/L
CO2: 28 MMOL/L
CREAT SERPL-MCNC: NORMAL MG/DL
EGFR: 99
GLUCOSE BLD-MCNC: 127 MG/DL
POTASSIUM SERPL-SCNC: 4 MMOL/L
SODIUM BLD-SCNC: 137 MMOL/L

## 2024-04-15 DIAGNOSIS — I10 PRIMARY HYPERTENSION: ICD-10-CM

## 2024-04-15 NOTE — TELEPHONE ENCOUNTER
Pt returned call pt would like prescription to go to [address], [place] [address] [phone]

## 2024-04-15 NOTE — TELEPHONE ENCOUNTER
Spoke with pt, pt v/u    Pt wants to know if she can up her hydrochlorothiazide to 25mg d/t swelling in ankles

## 2024-04-15 NOTE — TELEPHONE ENCOUNTER
Attempted to reach patient. No answer. Would like to know what pharmacy she would like HCTZ 25 mg tablet to go to, she may use her current prescription of 12.5 mg and take two tablets by mouth daily until supply runs out.

## 2024-04-16 RX ORDER — HYDROCHLOROTHIAZIDE 25 MG/1
25 TABLET ORAL EVERY MORNING
Qty: 90 TABLET | Refills: 0 | Status: SHIPPED | OUTPATIENT
Start: 2024-04-16

## 2024-04-23 ENCOUNTER — TELEPHONE (OUTPATIENT)
Dept: CARDIOLOGY CLINIC | Age: 69
End: 2024-04-23

## 2024-04-23 NOTE — TELEPHONE ENCOUNTER
See MURJ for interrogation.  Symptoms recorded appear to show NSR w/ frequent ectopy. PVC burden 5.2%.    AGK please review transmission and see patient message below.

## 2024-04-23 NOTE — TELEPHONE ENCOUNTER
Pt contacted office requesting sooner OV w/ AGK, pt states she had an episode where her heart starts to race and she passes out. Pt was not seen in ED, pt states when she wakes up she feels fine.

## 2024-04-23 NOTE — TELEPHONE ENCOUNTER
I spoke with the patient and she did send transmission on the 18th of April. She was on the way to get her medication. When she came to she was diaphoretic. She states that her Apple Watch is showing frequent extra beats since syncopal episode.     Do we have this transmission?

## 2024-04-24 NOTE — TELEPHONE ENCOUNTER
AGK is at 15 patients for tomorrow for half a day which is our absolute max. NPAM, can you look at your schedule for next week and see if and where patient can be added?

## 2024-04-24 NOTE — TELEPHONE ENCOUNTER
Lets see if we can over book patient tomorrow or with NP next week to see what's going on.  Thanks.

## 2024-04-25 NOTE — TELEPHONE ENCOUNTER
Spoke with pt and informed her office will handle the 5/9/24 agk appt at time of her 4/30/24 npam visit. Conor liriano

## 2024-04-25 NOTE — TELEPHONE ENCOUNTER
To be decided/discussed at NPAM appointment regarding next follow up. Don't cancel for now-will be discussed at OV 4/30/2024

## 2024-04-25 NOTE — TELEPHONE ENCOUNTER
Called And spoke with pt. Pt scheduled with NPAM on 4.30.2024. Pt asked if she should keep the 5.9.2024 with SID

## 2024-04-30 ENCOUNTER — NURSE ONLY (OUTPATIENT)
Dept: CARDIOLOGY CLINIC | Age: 69
End: 2024-04-30

## 2024-04-30 ENCOUNTER — OFFICE VISIT (OUTPATIENT)
Dept: CARDIOLOGY CLINIC | Age: 69
End: 2024-04-30
Payer: COMMERCIAL

## 2024-04-30 VITALS
WEIGHT: 141.6 LBS | HEIGHT: 67 IN | BODY MASS INDEX: 22.22 KG/M2 | DIASTOLIC BLOOD PRESSURE: 82 MMHG | HEART RATE: 63 BPM | OXYGEN SATURATION: 98 % | SYSTOLIC BLOOD PRESSURE: 138 MMHG

## 2024-04-30 DIAGNOSIS — R00.2 PALPITATIONS: ICD-10-CM

## 2024-04-30 DIAGNOSIS — Z45.09 ENCOUNTER FOR LOOP RECORDER CHECK: ICD-10-CM

## 2024-04-30 DIAGNOSIS — I48.0 PAROXYSMAL ATRIAL FIBRILLATION (HCC): Primary | ICD-10-CM

## 2024-04-30 DIAGNOSIS — I10 ESSENTIAL HYPERTENSION: ICD-10-CM

## 2024-04-30 DIAGNOSIS — Z79.899 MEDICATION MANAGEMENT: ICD-10-CM

## 2024-04-30 DIAGNOSIS — Z45.09 ENCOUNTER FOR LOOP RECORDER CHECK: Primary | ICD-10-CM

## 2024-04-30 DIAGNOSIS — I49.3 PREMATURE VENTRICULAR BEAT: ICD-10-CM

## 2024-04-30 PROCEDURE — G8420 CALC BMI NORM PARAMETERS: HCPCS | Performed by: NURSE PRACTITIONER

## 2024-04-30 PROCEDURE — 1123F ACP DISCUSS/DSCN MKR DOCD: CPT | Performed by: NURSE PRACTITIONER

## 2024-04-30 PROCEDURE — 3075F SYST BP GE 130 - 139MM HG: CPT | Performed by: NURSE PRACTITIONER

## 2024-04-30 PROCEDURE — 99215 OFFICE O/P EST HI 40 MIN: CPT | Performed by: NURSE PRACTITIONER

## 2024-04-30 PROCEDURE — 3079F DIAST BP 80-89 MM HG: CPT | Performed by: NURSE PRACTITIONER

## 2024-04-30 PROCEDURE — 3017F COLORECTAL CA SCREEN DOC REV: CPT | Performed by: NURSE PRACTITIONER

## 2024-04-30 PROCEDURE — G8427 DOCREV CUR MEDS BY ELIG CLIN: HCPCS | Performed by: NURSE PRACTITIONER

## 2024-04-30 PROCEDURE — 1036F TOBACCO NON-USER: CPT | Performed by: NURSE PRACTITIONER

## 2024-04-30 PROCEDURE — 1090F PRES/ABSN URINE INCON ASSESS: CPT | Performed by: NURSE PRACTITIONER

## 2024-04-30 PROCEDURE — G8400 PT W/DXA NO RESULTS DOC: HCPCS | Performed by: NURSE PRACTITIONER

## 2024-04-30 NOTE — PROGRESS NOTES
Cholecystectomy, laparoscopic (9/7/11); other surgical history (Right, 8.8.15); hip surgery; eye surgery (Bilateral); joint replacement (Right, 06/28/2018); Hip Arthroplasty (Right, 06/28/2018); and Insertable Cardiac Monitor (01/13/2020).     Home Medications:    Prior to Admission medications    Medication Sig Start Date End Date Taking? Authorizing Provider   hydroCHLOROthiazide (HYDRODIURIL) 25 MG tablet Take 1 tablet by mouth every morning 4/16/24  Yes Sukhdeep Waldron MD   fluticasone (FLONASE) 50 MCG/ACT nasal spray 1 spray by Each Nostril route daily   Yes ProviderShivam MD   cetirizine (ZYRTEC) 10 MG tablet Take 1 tablet by mouth daily   Yes ProviderShivam MD   Evolocumab (REPATHA SURECLICK) 140 MG/ML SOAJ Inject 140 mg into the skin every 14 days 11/28/23  Yes Dontrell Swift DO   sotalol (BETAPACE) 80 MG tablet Take 0.5 tablets by mouth daily 11/28/23  Yes ALETHA Montoya Jr., MD   Evolocumab (REPATHA) SOSY syringe Inject 1 mL into the skin every 14 days 11/21/23  Yes Sukhdeep Waldron MD   rosuvastatin (CRESTOR) 40 MG tablet Take 1 tablet by mouth every evening 11/21/23  Yes Sukhdeep Waldron MD   Semaglutide (RYBELSUS) 7 MG TABS One tab daily- start after taking 3mg 10/20/21  Yes Zaina Skinner MD   aspirin 81 MG chewable tablet Take 1 tablet by mouth daily   Yes ProviderShivam MD   losartan (COZAAR) 50 MG tablet Take 1 tablet by mouth daily  Patient taking differently: Take 2 tablets by mouth daily 1/22/2024 100 mg daily 2/19/19  Yes Kanu Bonilla MD   apixaban (ELIQUIS) 5 MG TABS tablet Take 1 tablet by mouth 2 times daily 7/18/18  Yes Sukhdeep Waldron MD   albuterol sulfate  (90 Base) MCG/ACT inhaler Inhale 2 puffs into the lungs every 6 hours as needed for Wheezing   Yes ProviderShivam MD   potassium chloride SA (KLOR-CON M20) 20 MEQ tablet TAKE 1 TABLET DAILY  Patient taking differently: Take 0.5 tablets by mouth daily TAKE 1 TABLET DAILY 8/16/16

## 2024-04-30 NOTE — PATIENT INSTRUCTIONS
No changes today     Continue loop recorder transmissions    I will  talk to Dr. Montoya about next steps and we will call you next week. May not need to keep 5/9/2024 appointment.

## 2024-05-06 ENCOUNTER — TELEPHONE (OUTPATIENT)
Dept: CARDIOLOGY | Age: 69
End: 2024-05-06

## 2024-05-06 NOTE — TELEPHONE ENCOUNTER
Discussed treatment options of symptomatic PVC's with Dr. Montoya. Options limited as she has been very sensitive to medications. Plan for electrophysiology study and PVC ablation. Spoke with patient (risks, benefits, and alternative therapy discussed). She wants to move forward with procedure. Adenike or EP nurse (looks like Adenike is out), please help me arrange for procedure.    Front staff, please cancel patient's 5/9/2024 appointment. She is aware we will be cancelling.

## 2024-05-07 NOTE — TELEPHONE ENCOUNTER
Procedure: PVC ablation  Time Requested: 4 hours  Anesthesia Services Requested: Yes  Labs: CBC, PT/INR, Electrolytes (Mg and Ca), Type and cross/screen 2 units crossed  Equipment: Carto  Medications to hold: None.

## 2024-05-07 NOTE — TELEPHONE ENCOUNTER
Adenike-please contact patient to schedule     AGK please advise on:  Procedure:   Time Requested:   Anesthesia Services Requested:   Labs: CBC, PT/INR, Electrolytes (Mg and Ca), Type and cross/screen  Equipment:   Medications to hold:

## 2024-06-04 ENCOUNTER — ANESTHESIA EVENT (OUTPATIENT)
Dept: CARDIAC CATH/INVASIVE PROCEDURES | Age: 69
DRG: 274 | End: 2024-06-04
Payer: MEDICARE

## 2024-06-05 ENCOUNTER — ANESTHESIA (OUTPATIENT)
Dept: CARDIAC CATH/INVASIVE PROCEDURES | Age: 69
DRG: 274 | End: 2024-06-05
Payer: MEDICARE

## 2024-06-05 ENCOUNTER — NURSE ONLY (OUTPATIENT)
Dept: CARDIOLOGY CLINIC | Age: 69
End: 2024-06-05

## 2024-06-05 ENCOUNTER — HOSPITAL ENCOUNTER (INPATIENT)
Age: 69
LOS: 1 days | Discharge: HOME | DRG: 274 | End: 2024-06-06
Attending: INTERNAL MEDICINE | Admitting: INTERNAL MEDICINE
Payer: MEDICARE

## 2024-06-05 DIAGNOSIS — I49.3 VENTRICULAR PREMATURE BEATS: ICD-10-CM

## 2024-06-05 DIAGNOSIS — I10 ESSENTIAL HYPERTENSION: ICD-10-CM

## 2024-06-05 LAB
ANION GAP SERPL CALCULATED.3IONS-SCNC: 15 MMOL/L (ref 3–16)
BUN SERPL-MCNC: 14 MG/DL (ref 7–20)
CALCIUM SERPL-MCNC: 9.4 MG/DL (ref 8.3–10.6)
CHLORIDE SERPL-SCNC: 93 MMOL/L (ref 99–110)
CO2 SERPL-SCNC: 27 MMOL/L (ref 21–32)
CREAT SERPL-MCNC: 0.7 MG/DL (ref 0.6–1.2)
DEPRECATED RDW RBC AUTO: 14 % (ref 12.4–15.4)
EKG ATRIAL RATE: 72 BPM
EKG DIAGNOSIS: NORMAL
EKG P AXIS: 44 DEGREES
EKG P-R INTERVAL: 210 MS
EKG Q-T INTERVAL: 446 MS
EKG QRS DURATION: 100 MS
EKG QTC CALCULATION (BAZETT): 511 MS
EKG R AXIS: -13 DEGREES
EKG T AXIS: 5 DEGREES
EKG VENTRICULAR RATE: 79 BPM
GFR SERPLBLD CREATININE-BSD FMLA CKD-EPI: >90 ML/MIN/{1.73_M2}
GLUCOSE SERPL-MCNC: 165 MG/DL (ref 70–99)
HCT VFR BLD AUTO: 45 % (ref 36–48)
HGB BLD-MCNC: 15.1 G/DL (ref 12–16)
MAGNESIUM SERPL-MCNC: 2 MG/DL (ref 1.8–2.4)
MCH RBC QN AUTO: 30.2 PG (ref 26–34)
MCHC RBC AUTO-ENTMCNC: 33.6 G/DL (ref 31–36)
MCV RBC AUTO: 89.9 FL (ref 80–100)
PLATELET # BLD AUTO: 266 K/UL (ref 135–450)
PMV BLD AUTO: 7.6 FL (ref 5–10.5)
POC ACT LR: 135 SEC
POC ACT LR: 310 SEC
POC ACT LR: >400 SEC
POTASSIUM SERPL-SCNC: 3 MMOL/L (ref 3.5–5.1)
RBC # BLD AUTO: 5.01 M/UL (ref 4–5.2)
SODIUM SERPL-SCNC: 135 MMOL/L (ref 136–145)
WBC # BLD AUTO: 10.4 K/UL (ref 4–11)

## 2024-06-05 PROCEDURE — C1894 INTRO/SHEATH, NON-LASER: HCPCS | Performed by: INTERNAL MEDICINE

## 2024-06-05 PROCEDURE — 93005 ELECTROCARDIOGRAM TRACING: CPT | Performed by: INTERNAL MEDICINE

## 2024-06-05 PROCEDURE — 2580000003 HC RX 258: Performed by: NURSE ANESTHETIST, CERTIFIED REGISTERED

## 2024-06-05 PROCEDURE — 6360000002 HC RX W HCPCS: Performed by: NURSE ANESTHETIST, CERTIFIED REGISTERED

## 2024-06-05 PROCEDURE — C1759 CATH, INTRA ECHOCARDIOGRAPHY: HCPCS | Performed by: INTERNAL MEDICINE

## 2024-06-05 PROCEDURE — 85347 COAGULATION TIME ACTIVATED: CPT

## 2024-06-05 PROCEDURE — 93010 ELECTROCARDIOGRAM REPORT: CPT | Performed by: INTERNAL MEDICINE

## 2024-06-05 PROCEDURE — 93623 PRGRMD STIMJ&PACG IV RX NFS: CPT | Performed by: INTERNAL MEDICINE

## 2024-06-05 PROCEDURE — C1732 CATH, EP, DIAG/ABL, 3D/VECT: HCPCS | Performed by: INTERNAL MEDICINE

## 2024-06-05 PROCEDURE — 85027 COMPLETE CBC AUTOMATED: CPT

## 2024-06-05 PROCEDURE — 2500000003 HC RX 250 WO HCPCS: Performed by: NURSE ANESTHETIST, CERTIFIED REGISTERED

## 2024-06-05 PROCEDURE — 02K83ZZ MAP CONDUCTION MECHANISM, PERCUTANEOUS APPROACH: ICD-10-PCS | Performed by: INTERNAL MEDICINE

## 2024-06-05 PROCEDURE — 93662 INTRACARDIAC ECG (ICE): CPT | Performed by: INTERNAL MEDICINE

## 2024-06-05 PROCEDURE — 93654 COMPRE EP EVAL TX VT: CPT | Performed by: INTERNAL MEDICINE

## 2024-06-05 PROCEDURE — 7100000010 HC PHASE II RECOVERY - FIRST 15 MIN: Performed by: INTERNAL MEDICINE

## 2024-06-05 PROCEDURE — 6360000002 HC RX W HCPCS

## 2024-06-05 PROCEDURE — 6370000000 HC RX 637 (ALT 250 FOR IP): Performed by: INTERNAL MEDICINE

## 2024-06-05 PROCEDURE — 2060000000 HC ICU INTERMEDIATE R&B

## 2024-06-05 PROCEDURE — 3700000001 HC ADD 15 MINUTES (ANESTHESIA): Performed by: INTERNAL MEDICINE

## 2024-06-05 PROCEDURE — C1769 GUIDE WIRE: HCPCS | Performed by: INTERNAL MEDICINE

## 2024-06-05 PROCEDURE — 2720000010 HC SURG SUPPLY STERILE: Performed by: INTERNAL MEDICINE

## 2024-06-05 PROCEDURE — 2580000003 HC RX 258: Performed by: INTERNAL MEDICINE

## 2024-06-05 PROCEDURE — 7100000011 HC PHASE II RECOVERY - ADDTL 15 MIN: Performed by: INTERNAL MEDICINE

## 2024-06-05 PROCEDURE — 6360000002 HC RX W HCPCS: Performed by: INTERNAL MEDICINE

## 2024-06-05 PROCEDURE — 2709999900 HC NON-CHARGEABLE SUPPLY: Performed by: INTERNAL MEDICINE

## 2024-06-05 PROCEDURE — 02583ZZ DESTRUCTION OF CONDUCTION MECHANISM, PERCUTANEOUS APPROACH: ICD-10-PCS | Performed by: INTERNAL MEDICINE

## 2024-06-05 PROCEDURE — 4A023FZ MEASUREMENT OF CARDIAC RHYTHM, PERCUTANEOUS APPROACH: ICD-10-PCS | Performed by: INTERNAL MEDICINE

## 2024-06-05 PROCEDURE — 2500000003 HC RX 250 WO HCPCS

## 2024-06-05 PROCEDURE — 80048 BASIC METABOLIC PNL TOTAL CA: CPT

## 2024-06-05 PROCEDURE — 83735 ASSAY OF MAGNESIUM: CPT

## 2024-06-05 PROCEDURE — 93655 ICAR CATH ABLTJ DSCRT ARRHYT: CPT | Performed by: INTERNAL MEDICINE

## 2024-06-05 PROCEDURE — 2500000003 HC RX 250 WO HCPCS: Performed by: INTERNAL MEDICINE

## 2024-06-05 PROCEDURE — 3700000000 HC ANESTHESIA ATTENDED CARE: Performed by: INTERNAL MEDICINE

## 2024-06-05 PROCEDURE — 4A0234Z MEASUREMENT OF CARDIAC ELECTRICAL ACTIVITY, PERCUTANEOUS APPROACH: ICD-10-PCS | Performed by: INTERNAL MEDICINE

## 2024-06-05 PROCEDURE — 99239 HOSP IP/OBS DSCHRG MGMT >30: CPT

## 2024-06-05 RX ORDER — CETIRIZINE HYDROCHLORIDE 10 MG/1
10 TABLET ORAL DAILY
Status: DISCONTINUED | OUTPATIENT
Start: 2024-06-05 | End: 2024-06-06 | Stop reason: HOSPADM

## 2024-06-05 RX ORDER — LOSARTAN POTASSIUM 25 MG/1
50 TABLET ORAL DAILY
Status: DISCONTINUED | OUTPATIENT
Start: 2024-06-06 | End: 2024-06-05

## 2024-06-05 RX ORDER — ONDANSETRON 2 MG/ML
4 INJECTION INTRAMUSCULAR; INTRAVENOUS EVERY 6 HOURS PRN
Status: DISCONTINUED | OUTPATIENT
Start: 2024-06-05 | End: 2024-06-05 | Stop reason: HOSPADM

## 2024-06-05 RX ORDER — HEPARIN SODIUM 1000 [USP'U]/ML
INJECTION, SOLUTION INTRAVENOUS; SUBCUTANEOUS PRN
Status: DISCONTINUED | OUTPATIENT
Start: 2024-06-05 | End: 2024-06-05 | Stop reason: HOSPADM

## 2024-06-05 RX ORDER — LOSARTAN POTASSIUM 100 MG/1
100 TABLET ORAL DAILY
Status: DISCONTINUED | OUTPATIENT
Start: 2024-06-06 | End: 2024-06-06

## 2024-06-05 RX ORDER — MAGNESIUM SULFATE IN WATER 40 MG/ML
2000 INJECTION, SOLUTION INTRAVENOUS ONCE
Status: COMPLETED | OUTPATIENT
Start: 2024-06-05 | End: 2024-06-05

## 2024-06-05 RX ORDER — SODIUM CHLORIDE 9 MG/ML
INJECTION, SOLUTION INTRAVENOUS PRN
Status: DISCONTINUED | OUTPATIENT
Start: 2024-06-05 | End: 2024-06-05 | Stop reason: HOSPADM

## 2024-06-05 RX ORDER — DEXAMETHASONE SODIUM PHOSPHATE 4 MG/ML
INJECTION, SOLUTION INTRA-ARTICULAR; INTRALESIONAL; INTRAMUSCULAR; INTRAVENOUS; SOFT TISSUE PRN
Status: DISCONTINUED | OUTPATIENT
Start: 2024-06-05 | End: 2024-06-05 | Stop reason: SDUPTHER

## 2024-06-05 RX ORDER — MIDAZOLAM HYDROCHLORIDE 1 MG/ML
INJECTION INTRAMUSCULAR; INTRAVENOUS PRN
Status: DISCONTINUED | OUTPATIENT
Start: 2024-06-05 | End: 2024-06-05 | Stop reason: SDUPTHER

## 2024-06-05 RX ORDER — FLUTICASONE PROPIONATE 50 MCG
1 SPRAY, SUSPENSION (ML) NASAL DAILY
Status: DISCONTINUED | OUTPATIENT
Start: 2024-06-05 | End: 2024-06-06 | Stop reason: HOSPADM

## 2024-06-05 RX ORDER — SODIUM CHLORIDE 0.9 % (FLUSH) 0.9 %
5-40 SYRINGE (ML) INJECTION PRN
Status: DISCONTINUED | OUTPATIENT
Start: 2024-06-05 | End: 2024-06-06 | Stop reason: HOSPADM

## 2024-06-05 RX ORDER — SODIUM CHLORIDE 0.9 % (FLUSH) 0.9 %
5-40 SYRINGE (ML) INJECTION PRN
Status: DISCONTINUED | OUTPATIENT
Start: 2024-06-05 | End: 2024-06-05 | Stop reason: HOSPADM

## 2024-06-05 RX ORDER — ACETAMINOPHEN 325 MG/1
650 TABLET ORAL EVERY 4 HOURS PRN
Status: DISCONTINUED | OUTPATIENT
Start: 2024-06-05 | End: 2024-06-06 | Stop reason: HOSPADM

## 2024-06-05 RX ORDER — ONDANSETRON 2 MG/ML
INJECTION INTRAMUSCULAR; INTRAVENOUS PRN
Status: DISCONTINUED | OUTPATIENT
Start: 2024-06-05 | End: 2024-06-05 | Stop reason: SDUPTHER

## 2024-06-05 RX ORDER — HYDROCHLOROTHIAZIDE 25 MG/1
25 TABLET ORAL EVERY MORNING
Status: DISCONTINUED | OUTPATIENT
Start: 2024-06-05 | End: 2024-06-06 | Stop reason: HOSPADM

## 2024-06-05 RX ORDER — BUPIVACAINE HYDROCHLORIDE 5 MG/ML
INJECTION, SOLUTION EPIDURAL; INTRACAUDAL PRN
Status: DISCONTINUED | OUTPATIENT
Start: 2024-06-05 | End: 2024-06-05 | Stop reason: HOSPADM

## 2024-06-05 RX ORDER — SODIUM CHLORIDE 0.9 % (FLUSH) 0.9 %
5-40 SYRINGE (ML) INJECTION EVERY 12 HOURS SCHEDULED
Status: DISCONTINUED | OUTPATIENT
Start: 2024-06-05 | End: 2024-06-06 | Stop reason: HOSPADM

## 2024-06-05 RX ORDER — SODIUM CHLORIDE 9 MG/ML
INJECTION, SOLUTION INTRAVENOUS CONTINUOUS PRN
Status: COMPLETED | OUTPATIENT
Start: 2024-06-05 | End: 2024-06-05

## 2024-06-05 RX ORDER — ROSUVASTATIN CALCIUM 10 MG/1
40 TABLET, COATED ORAL EVERY EVENING
Status: DISCONTINUED | OUTPATIENT
Start: 2024-06-05 | End: 2024-06-06 | Stop reason: HOSPADM

## 2024-06-05 RX ORDER — LIDOCAINE HYDROCHLORIDE 20 MG/ML
INJECTION, SOLUTION EPIDURAL; INFILTRATION; INTRACAUDAL; PERINEURAL PRN
Status: DISCONTINUED | OUTPATIENT
Start: 2024-06-05 | End: 2024-06-05 | Stop reason: HOSPADM

## 2024-06-05 RX ORDER — HEPARIN SODIUM 10000 [USP'U]/100ML
INJECTION, SOLUTION INTRAVENOUS CONTINUOUS PRN
Status: DISCONTINUED | OUTPATIENT
Start: 2024-06-05 | End: 2024-06-05 | Stop reason: HOSPADM

## 2024-06-05 RX ORDER — ALBUTEROL SULFATE 90 UG/1
2 AEROSOL, METERED RESPIRATORY (INHALATION) EVERY 6 HOURS PRN
Status: DISCONTINUED | OUTPATIENT
Start: 2024-06-05 | End: 2024-06-06 | Stop reason: HOSPADM

## 2024-06-05 RX ORDER — PHENYLEPHRINE HCL IN 0.9% NACL 1 MG/10 ML
SYRINGE (ML) INTRAVENOUS PRN
Status: DISCONTINUED | OUTPATIENT
Start: 2024-06-05 | End: 2024-06-05 | Stop reason: SDUPTHER

## 2024-06-05 RX ORDER — LEVOTHYROXINE SODIUM 0.03 MG/1
25 TABLET ORAL DAILY
Status: DISCONTINUED | OUTPATIENT
Start: 2024-06-05 | End: 2024-06-06 | Stop reason: HOSPADM

## 2024-06-05 RX ORDER — LORAZEPAM 0.5 MG/1
0.5 TABLET ORAL
Status: DISCONTINUED | OUTPATIENT
Start: 2024-06-05 | End: 2024-06-05 | Stop reason: HOSPADM

## 2024-06-05 RX ORDER — SODIUM CHLORIDE 0.9 % (FLUSH) 0.9 %
5-40 SYRINGE (ML) INJECTION EVERY 12 HOURS SCHEDULED
Status: DISCONTINUED | OUTPATIENT
Start: 2024-06-05 | End: 2024-06-05 | Stop reason: HOSPADM

## 2024-06-05 RX ORDER — POTASSIUM CHLORIDE 750 MG/1
10 TABLET, EXTENDED RELEASE ORAL DAILY
Status: DISCONTINUED | OUTPATIENT
Start: 2024-06-05 | End: 2024-06-06 | Stop reason: HOSPADM

## 2024-06-05 RX ORDER — SODIUM CHLORIDE 9 MG/ML
INJECTION, SOLUTION INTRAVENOUS CONTINUOUS PRN
Status: DISCONTINUED | OUTPATIENT
Start: 2024-06-05 | End: 2024-06-05 | Stop reason: SDUPTHER

## 2024-06-05 RX ORDER — SOTALOL HYDROCHLORIDE 80 MG/1
40 TABLET ORAL DAILY
Status: DISCONTINUED | OUTPATIENT
Start: 2024-06-05 | End: 2024-06-06 | Stop reason: HOSPADM

## 2024-06-05 RX ORDER — LANOLIN ALCOHOL/MO/W.PET/CERES
400 CREAM (GRAM) TOPICAL DAILY
Status: DISCONTINUED | OUTPATIENT
Start: 2024-06-06 | End: 2024-06-06 | Stop reason: HOSPADM

## 2024-06-05 RX ORDER — ASPIRIN 81 MG/1
81 TABLET, CHEWABLE ORAL DAILY
Status: DISCONTINUED | OUTPATIENT
Start: 2024-06-05 | End: 2024-06-06 | Stop reason: HOSPADM

## 2024-06-05 RX ORDER — SODIUM CHLORIDE 9 MG/ML
INJECTION, SOLUTION INTRAVENOUS PRN
Status: DISCONTINUED | OUTPATIENT
Start: 2024-06-05 | End: 2024-06-06 | Stop reason: HOSPADM

## 2024-06-05 RX ORDER — PROTAMINE SULFATE 10 MG/ML
INJECTION, SOLUTION INTRAVENOUS PRN
Status: DISCONTINUED | OUTPATIENT
Start: 2024-06-05 | End: 2024-06-05 | Stop reason: SDUPTHER

## 2024-06-05 RX ORDER — OXYCODONE HYDROCHLORIDE AND ACETAMINOPHEN 5; 325 MG/1; MG/1
1 TABLET ORAL EVERY 4 HOURS PRN
Status: DISCONTINUED | OUTPATIENT
Start: 2024-06-05 | End: 2024-06-06 | Stop reason: HOSPADM

## 2024-06-05 RX ADMIN — Medication 100 MCG: at 12:25

## 2024-06-05 RX ADMIN — ACETAMINOPHEN 650 MG: 325 TABLET ORAL at 19:15

## 2024-06-05 RX ADMIN — MIDAZOLAM 0.5 MG: 1 INJECTION INTRAMUSCULAR; INTRAVENOUS at 09:10

## 2024-06-05 RX ADMIN — SOTALOL HYDROCHLORIDE 40 MG: 80 TABLET ORAL at 21:19

## 2024-06-05 RX ADMIN — Medication 100 MCG: at 12:30

## 2024-06-05 RX ADMIN — DEXMEDETOMIDINE HYDROCHLORIDE 2 MCG: 100 INJECTION, SOLUTION INTRAVENOUS at 09:25

## 2024-06-05 RX ADMIN — Medication 100 MCG: at 09:41

## 2024-06-05 RX ADMIN — DEXMEDETOMIDINE HYDROCHLORIDE 2 MCG: 100 INJECTION, SOLUTION INTRAVENOUS at 10:47

## 2024-06-05 RX ADMIN — MAGNESIUM SULFATE HEPTAHYDRATE 2000 MG: 40 INJECTION, SOLUTION INTRAVENOUS at 18:09

## 2024-06-05 RX ADMIN — ASPIRIN 81 MG 81 MG: 81 TABLET ORAL at 21:23

## 2024-06-05 RX ADMIN — MIDAZOLAM 0.5 MG: 1 INJECTION INTRAMUSCULAR; INTRAVENOUS at 09:36

## 2024-06-05 RX ADMIN — SODIUM CHLORIDE: 9 INJECTION, SOLUTION INTRAVENOUS at 09:05

## 2024-06-05 RX ADMIN — SODIUM CHLORIDE: 9 INJECTION, SOLUTION INTRAVENOUS at 12:34

## 2024-06-05 RX ADMIN — MIDAZOLAM 0.5 MG: 1 INJECTION INTRAMUSCULAR; INTRAVENOUS at 10:37

## 2024-06-05 RX ADMIN — PROTAMINE SULFATE 60 MG: 10 INJECTION, SOLUTION INTRAVENOUS at 12:09

## 2024-06-05 RX ADMIN — ONDANSETRON 4 MG: 2 INJECTION INTRAMUSCULAR; INTRAVENOUS at 12:23

## 2024-06-05 RX ADMIN — DEXMEDETOMIDINE HYDROCHLORIDE 2 MCG: 100 INJECTION, SOLUTION INTRAVENOUS at 11:38

## 2024-06-05 RX ADMIN — ONDANSETRON 4 MG: 2 INJECTION INTRAMUSCULAR; INTRAVENOUS at 12:14

## 2024-06-05 RX ADMIN — CETIRIZINE HYDROCHLORIDE 10 MG: 10 TABLET, FILM COATED ORAL at 21:22

## 2024-06-05 RX ADMIN — DEXAMETHASONE SODIUM PHOSPHATE 4 MG: 4 INJECTION, SOLUTION INTRAMUSCULAR; INTRAVENOUS at 11:24

## 2024-06-05 RX ADMIN — POTASSIUM CHLORIDE 10 MEQ: 750 TABLET, EXTENDED RELEASE ORAL at 21:22

## 2024-06-05 RX ADMIN — ROSUVASTATIN 40 MG: 10 TABLET, FILM COATED ORAL at 21:22

## 2024-06-05 RX ADMIN — DEXMEDETOMIDINE HYDROCHLORIDE 4 MCG: 100 INJECTION, SOLUTION INTRAVENOUS at 09:08

## 2024-06-05 RX ADMIN — SODIUM CHLORIDE, PRESERVATIVE FREE 10 ML: 5 INJECTION INTRAVENOUS at 21:55

## 2024-06-05 RX ADMIN — MIDAZOLAM 0.5 MG: 1 INJECTION INTRAMUSCULAR; INTRAVENOUS at 09:17

## 2024-06-05 ASSESSMENT — PAIN SCALES - GENERAL
PAINLEVEL_OUTOF10: 2
PAINLEVEL_OUTOF10: 1

## 2024-06-05 ASSESSMENT — PAIN DESCRIPTION - LOCATION: LOCATION: HEAD

## 2024-06-05 ASSESSMENT — ENCOUNTER SYMPTOMS: SHORTNESS OF BREATH: 1

## 2024-06-05 ASSESSMENT — PAIN - FUNCTIONAL ASSESSMENT: PAIN_FUNCTIONAL_ASSESSMENT: ACTIVITIES ARE NOT PREVENTED

## 2024-06-05 ASSESSMENT — PAIN DESCRIPTION - DESCRIPTORS: DESCRIPTORS: ACHING

## 2024-06-05 NOTE — DISCHARGE SUMMARY
Patient ID:  Tyesha Waterman  [id number]  68 y.o.  1955    Admit date: 6/5/2024    Discharge date and time: 6/6/2024    Admitting Physician: ALETHA Montoya Jr., MD     Discharge NP: Lady HECK-CNP    Admission Diagnoses: Ventricular premature beats [I49.3]  PVC (premature ventricular contraction) [I49.3]    Discharge Diagnoses: SAME    Admission Condition: fair    Discharged Condition: good    Hospital Course: Tyesha Waterman was admitted on 6/5/2024 and had PVC ablation. Rhythm has been SR with PVCs. Denies chest pain, shortness of breath and palpitations. Has eaten, ambulated, and voided. Will reduce Cozaar to 50 mg daily due to patient concern for \"lower BP\".  Patient also had CT chest performed while admitted. A 6 mm right lower lobe nodule found. She will follow-up with PCP    Consults: None    Assessment:   Paroxysmal atrial fibrillation              -[id number] score 4 (age, gender, HTN, DM)               -Sotalol resumed 2023 (monitoring for toxicity with antiarrythmic medications)  Recurrent syncope/near syncope              -s/p IILR implant with explant and re-implant 12/2023  Frequent PVC's              -10% burden on ILR 4/30/2024   CAD: followed by Dr. Waldron   HTN  HLD  DM  Anxiety   Hypothyroidism: Synthroid  ARACELIS      Plan:   Continue Eliquis 5 mg twice daily  Continue Sotalol 40 mg daily  Continue ASA 81 mg daily  Continue Cozaar 50 mg daily  Continue Hydrochlorothiazide 25 mg daily  Continue Crestor 40 mg daily  Follow up with PCP regarding CT chest results    Post procedure instructions reviewed  Follow up in office on 7/23/2024 with Dr Montoya  Follow up with Dr Wadlron on 9/19/2024      Discharge Exam:  /79   Pulse 60   Temp 97.4 °F (36.3 °C) (Axillary)   Resp 18   Ht 1.676 m (5' 6\")   Wt 67.2 kg (148 lb 3.2 oz)   SpO2 97%   BMI 23.92 kg/m²     General Appearance:    Alert, cooperative, no distress, appears stated age   Head:    Normocephalic, without obvious abnormality,  atraumatic   Eyes:    PERRL, conjunctiva/corneas clear, EOM's intact        Ears:    deferred   Nose:   Nares normal, septum midline, mucosa normal, no drainage  or sinus tenderness   Throat:   Lips, mucosa, and tongue normal; teeth and gums normal   Neck:   Supple, symmetrical, trachea midline, no adenopathy;        thyroid:  No enlargement/tenderness/nodules; no carotid    bruit or JVD   Back:     Symmetric, no curvature, ROM normal, no CVA tenderness   Lungs:     Clear to auscultation bilaterally, respirations unlabored   Chest wall:    No tenderness or deformity   Heart:    Regular rate and rhythm, S1 and S2 normal, no murmur, rub   or gallop; NSR  on tele   Abdomen:     Soft, non-tender, bowel sounds active all four quadrants,     no masses, no organomegaly   Genitalia:    deferred   Rectal:    deferred    Extremities:   Extremities normal, atraumatic, no cyanosis or edema; bilateral femoral sites stable (no sutures present) no bleeding or hematoma   Pulses:   2+ and symmetric all extremities   Skin:   Skin color, texture, turgor normal, no rashes or lesions   Lymph nodes:   Cervical, supraclavicular, and axillary nodes normal   Neurologic:   CNII-XII intact. Normal strength, sensation and reflexes       throughout     Disposition: home    Patient Instructions:      Medication List        CHANGE how you take these medications      losartan 50 MG tablet  Commonly known as: COZAAR  Take 1 tablet by mouth daily  What changed:   how much to take  additional instructions     potassium chloride 10 MEQ extended release tablet  Commonly known as: KLOR-CON M  Take 1 tablet by mouth daily  Start taking on: June 7, 2024  What changed:   medication strength  how much to take  how to take this  when to take this  additional instructions            CONTINUE taking these medications      albuterol sulfate  (90 Base) MCG/ACT inhaler  Commonly known as: PROVENTIL;VENTOLIN;PROAIR     apixaban 5 MG Tabs tablet  Commonly

## 2024-06-05 NOTE — PROGRESS NOTES
Pt transferred to  431, pt moved from stretcher to IP bed.  Site checked with Olga RN, no bleeding or hematoma present.  Daughter at bedside.

## 2024-06-05 NOTE — PLAN OF CARE
Multifocal PVC ablation with both arterial and venous access.  Right femoral artery bleeding post ablation.  Full note to come.    Augustus Montoya MD  Cardiac Electrophysiology  [facility]  [phone] [place] Office

## 2024-06-05 NOTE — H&P
[facility]   Cardiology Admission Note              Date:   6/5/2024  Patient name: Tyesha Waterman  Date of admission:  6/5/2024  6:33 AM  MRN:   [id number]  YOB: 1955    Primary Care physician: Isaura Wade, MARIA R - CNP    Reason for Admission:  arrhythmia    CHIEF COMPLAINT:  Symptomatic premature ventricular tachycardia     History Obtained From:  patient    HISTORY OF PRESENT ILLNESS:    Mrs. Waterman is a pleasant 68 year old female with a medical history significant for paroxysmal atrial fibrillation (sotalol), ischemic cardiomyopathy, diabetes mellitus type II, hypertension, and hyperlipidemia who presents from home for PVC ablation.  Patient reports ongoing palpitations, shortness of breath, and dizziness.  Her ILR shows 10% PVC burden despite sotalol.     Past Medical History:   has a past medical history of A-fib (HCC), Anemia, CAD (coronary artery disease), Cholelithiasis, Complication of anesthesia, Diabetes mellitus (HCC), GERD (gastroesophageal reflux disease), Hyperlipidemia, Hypertension, MI (myocardial infarction) ([facility]), Nausea & vomiting, ARACELIS (obstructive sleep apnea), PONV (postoperative nausea and vomiting), Primary osteoarthritis of right hip, Prolonged emergence from general anesthesia, and Seasonal allergies.    Past Surgical History:   has a past surgical history that includes Coronary angioplasty with stent (1/2009); Rotator cuff repair; Hand surgery; Hysterectomy; Cholecystectomy, laparoscopic (9/7/11); other surgical history (Right, 8.8.15); hip surgery; eye surgery (Bilateral); joint replacement (Right, 06/28/2018); Hip Arthroplasty (Right, 06/28/2018); and Insertable Cardiac Monitor (01/13/2020).     Home Medications:    Prior to Admission medications    Medication Sig Start Date End Date Taking? Authorizing Provider   hydroCHLOROthiazide (HYDRODIURIL) 25 MG tablet Take 1 tablet by mouth every morning 4/16/24   Sukhdeep Waldron MD   guaiFENesin (MUCINEX) 600  LIPID PANEL:  Lab Results   Component Value Date/Time    HDL 38 08/05/2020 12:00 AM    TRIG 192 08/05/2020 12:00 AM     LIVER PROFILE:No results for input(s): \"AST\", \"ALT\" in the last 72 hours.    Invalid input(s): \"ALB\"    IMPRESSION:    Patient Active Problem List   Diagnosis    HTN (hypertension)    Hyperlipidemia    Presence of stent in coronary artery    DM (diabetes mellitus) (HCC)    Anxiety and depression    SOB (shortness of breath)    A-fib (HCC)    Anemia    Acute myocardial infarction (HCC)    ARACELIS (obstructive sleep apnea)    Chronic rhinitis    Dizziness    Atherosclerosis of coronary artery    Diabetes mellitus, type 2 (HCC)    Hypothyroidism    Closed right hip fracture (HCC)    Chronic ischemic heart disease    Avascular necrosis of bone of hip, right (HCC)    Primary osteoarthritis of right hip    History of total right hip replacement    Premature ventricular beat    Encounter for loop recorder check    Palpitations    Other chest pain    Medication management     RECOMMENDATIONS:  PVC ablation.    Discussed with patient and nursing.    All questions and concerns were addressed to the patient/family. Alternatives to my treatment were discussed. The note was completed using EMR. Every effort was made to ensure accuracy; however, inadvertent computerized transcription errors may be present.    Augustus Montoya MD  Cardiac Electrophysiology  [facility]  [phone] [place] Office

## 2024-06-05 NOTE — PROGRESS NOTES
Report given to patients nurse Olga RN. Pt transferred to room per EP staff.   CMU called and monitor is on and verified.  Bilateral groin sites remain unremarkable.  Family at bedside. No c/o voiced.

## 2024-06-05 NOTE — ANESTHESIA PRE PROCEDURE
Department of Anesthesiology  Preprocedure Note       Name:  Tyesha Waterman   Age:  68 y.o.  :  1955                                          MRN:  [id number]         Date:  2024      Surgeon: Surgeon(s):  ALETHA Montoya Jr., MD    Procedure: Procedure(s):  Ablation PVC    Medications prior to admission:   Prior to Admission medications    Medication Sig Start Date End Date Taking? Authorizing Provider   hydroCHLOROthiazide (HYDRODIURIL) 25 MG tablet Take 1 tablet by mouth every morning 24   Sukhdeep Waldron MD   guaiFENesin (MUCINEX) 600 MG extended release tablet Take 2 tablets by mouth 2 times daily  Patient not taking: Reported on 2024    ProviderShivam MD   fluticasone (FLONASE) 50 MCG/ACT nasal spray 1 spray by Each Nostril route daily    Shivam Horne MD   cetirizine (ZYRTEC) 10 MG tablet Take 1 tablet by mouth daily    Shivam Horne MD   hydroCHLOROthiazide 12.5 MG capsule Take 1 capsule by mouth every morning  Patient not taking: Reported on 2024 3/13/24   Sukhdeep Waldron MD   Evolocumab (REPATHA SURECLICK) 140 MG/ML SOAJ Inject 140 mg into the skin every 14 days 23   Dontrell Swift DO   sotalol (BETAPACE) 80 MG tablet Take 0.5 tablets by mouth daily 23   ALETHA Montoya Jr., MD   Evolocumab (REPATHA) SOSY syringe Inject 1 mL into the skin every 14 days 23   Sukhdeep Waldron MD   rosuvastatin (CRESTOR) 40 MG tablet Take 1 tablet by mouth every evening 23   Sukhdeep Waldron MD   Semaglutide (RYBELSUS) 7 MG TABS One tab daily- start after taking 3mg 10/20/21   Zaina Skinner MD   aspirin 81 MG chewable tablet Take 1 tablet by mouth daily    Shivam Horne MD   losartan (COZAAR) 50 MG tablet Take 1 tablet by mouth daily  Patient taking differently: Take 2 tablets by mouth daily 2024 100 mg daily 19   Kanu Bonilla MD   apixaban (ELIQUIS) 5 MG TABS tablet Take 1 tablet by mouth 2 times daily 18

## 2024-06-05 NOTE — ANESTHESIA POSTPROCEDURE EVALUATION
Department of Anesthesiology  Postprocedure Note    Patient: Tyesha Waterman  MRN: [id number]  YOB: 1955  Date of evaluation: 6/5/2024    Procedure Summary       Date: 06/05/24 Room / Location: [facility] CATH/EP LAB 4 / [facility] CARDIAC CATH LAB    Anesthesia Start: 0905 Anesthesia Stop: 1305    Procedure: Ablation PVC Diagnosis:       Ventricular premature beats      (Ventricular premature beats [I49.3])    Providers: ALETHA Montoya Jr., MD Responsible Provider: Jairo Celis MD    Anesthesia Type: MAC ASA Status: 3            Anesthesia Type: No value filed.    [facility] Phase I:      [facility] Phase II:      Anesthesia Post Evaluation    Patient location during evaluation: PACU  Patient participation: complete - patient participated  Level of consciousness: awake and alert  Pain score: 0  Airway patency: patent  Nausea & Vomiting: no nausea and no vomiting  Cardiovascular status: blood pressure returned to baseline  Respiratory status: acceptable  Hydration status: stable  Pain management: adequate    No notable events documented.

## 2024-06-06 ENCOUNTER — APPOINTMENT (OUTPATIENT)
Dept: CT IMAGING | Age: 69
DRG: 274 | End: 2024-06-06
Attending: INTERNAL MEDICINE
Payer: MEDICARE

## 2024-06-06 VITALS
DIASTOLIC BLOOD PRESSURE: 79 MMHG | BODY MASS INDEX: 23.82 KG/M2 | HEART RATE: 60 BPM | SYSTOLIC BLOOD PRESSURE: 134 MMHG | HEIGHT: 66 IN | RESPIRATION RATE: 18 BRPM | TEMPERATURE: 97.4 F | WEIGHT: 148.2 LBS | OXYGEN SATURATION: 97 %

## 2024-06-06 LAB
ANION GAP SERPL CALCULATED.3IONS-SCNC: 10 MMOL/L (ref 3–16)
BUN SERPL-MCNC: 15 MG/DL (ref 7–20)
CALCIUM SERPL-MCNC: 8.4 MG/DL (ref 8.3–10.6)
CHLORIDE SERPL-SCNC: 100 MMOL/L (ref 99–110)
CO2 SERPL-SCNC: 26 MMOL/L (ref 21–32)
CREAT SERPL-MCNC: 0.6 MG/DL (ref 0.6–1.2)
DEPRECATED RDW RBC AUTO: 13.9 % (ref 12.4–15.4)
EKG ATRIAL RATE: 63 BPM
EKG DIAGNOSIS: NORMAL
EKG P AXIS: 43 DEGREES
EKG P-R INTERVAL: 220 MS
EKG Q-T INTERVAL: 490 MS
EKG QRS DURATION: 98 MS
EKG QTC CALCULATION (BAZETT): 501 MS
EKG R AXIS: -2 DEGREES
EKG T AXIS: -14 DEGREES
EKG VENTRICULAR RATE: 63 BPM
GFR SERPLBLD CREATININE-BSD FMLA CKD-EPI: >90 ML/MIN/{1.73_M2}
GLUCOSE BLD-MCNC: 139 MG/DL (ref 70–99)
GLUCOSE BLD-MCNC: 183 MG/DL (ref 70–99)
GLUCOSE BLD-MCNC: 327 MG/DL (ref 70–99)
GLUCOSE SERPL-MCNC: 166 MG/DL (ref 70–99)
HCT VFR BLD AUTO: 33.1 % (ref 36–48)
HGB BLD-MCNC: 11.3 G/DL (ref 12–16)
MAGNESIUM SERPL-MCNC: 2.1 MG/DL (ref 1.8–2.4)
MCH RBC QN AUTO: 30.4 PG (ref 26–34)
MCHC RBC AUTO-ENTMCNC: 34.3 G/DL (ref 31–36)
MCV RBC AUTO: 88.8 FL (ref 80–100)
PERFORMED ON: ABNORMAL
PLATELET # BLD AUTO: 205 K/UL (ref 135–450)
PMV BLD AUTO: 8 FL (ref 5–10.5)
POC ACT LR: >400 SEC
POTASSIUM SERPL-SCNC: 3.7 MMOL/L (ref 3.5–5.1)
RBC # BLD AUTO: 3.73 M/UL (ref 4–5.2)
SODIUM SERPL-SCNC: 136 MMOL/L (ref 136–145)
WBC # BLD AUTO: 12 K/UL (ref 4–11)

## 2024-06-06 PROCEDURE — 2580000003 HC RX 258: Performed by: INTERNAL MEDICINE

## 2024-06-06 PROCEDURE — 71250 CT THORAX DX C-: CPT

## 2024-06-06 PROCEDURE — 83735 ASSAY OF MAGNESIUM: CPT

## 2024-06-06 PROCEDURE — 6370000000 HC RX 637 (ALT 250 FOR IP): Performed by: INTERNAL MEDICINE

## 2024-06-06 PROCEDURE — 85027 COMPLETE CBC AUTOMATED: CPT

## 2024-06-06 PROCEDURE — 6370000000 HC RX 637 (ALT 250 FOR IP)

## 2024-06-06 PROCEDURE — 80048 BASIC METABOLIC PNL TOTAL CA: CPT

## 2024-06-06 PROCEDURE — 93005 ELECTROCARDIOGRAM TRACING: CPT | Performed by: INTERNAL MEDICINE

## 2024-06-06 RX ORDER — LOSARTAN POTASSIUM 25 MG/1
50 TABLET ORAL DAILY
Status: DISCONTINUED | OUTPATIENT
Start: 2024-06-06 | End: 2024-06-06 | Stop reason: HOSPADM

## 2024-06-06 RX ORDER — LOSARTAN POTASSIUM 50 MG/1
50 TABLET ORAL DAILY
Qty: 30 TABLET | Refills: 1 | Status: SHIPPED | OUTPATIENT
Start: 2024-06-06

## 2024-06-06 RX ORDER — POTASSIUM CHLORIDE 750 MG/1
10 TABLET, EXTENDED RELEASE ORAL DAILY
Qty: 60 TABLET | Refills: 3 | Status: SHIPPED | OUTPATIENT
Start: 2024-06-07

## 2024-06-06 RX ADMIN — LEVOTHYROXINE SODIUM 25 MCG: 0.03 TABLET ORAL at 09:13

## 2024-06-06 RX ADMIN — POTASSIUM CHLORIDE 10 MEQ: 750 TABLET, EXTENDED RELEASE ORAL at 09:17

## 2024-06-06 RX ADMIN — FLUTICASONE PROPIONATE 1 SPRAY: 50 SPRAY, METERED NASAL at 09:29

## 2024-06-06 RX ADMIN — HYDROCHLOROTHIAZIDE 25 MG: 25 TABLET ORAL at 09:16

## 2024-06-06 RX ADMIN — Medication 400 MG: at 09:16

## 2024-06-06 RX ADMIN — SOTALOL HYDROCHLORIDE 40 MG: 80 TABLET ORAL at 09:13

## 2024-06-06 RX ADMIN — SODIUM CHLORIDE, PRESERVATIVE FREE 10 ML: 5 INJECTION INTRAVENOUS at 09:26

## 2024-06-06 RX ADMIN — APIXABAN 5 MG: 5 TABLET, FILM COATED ORAL at 09:17

## 2024-06-06 RX ADMIN — CETIRIZINE HYDROCHLORIDE 10 MG: 10 TABLET, FILM COATED ORAL at 09:13

## 2024-06-06 RX ADMIN — LOSARTAN POTASSIUM 50 MG: 25 TABLET, FILM COATED ORAL at 11:52

## 2024-06-06 RX ADMIN — ASPIRIN 81 MG 81 MG: 81 TABLET ORAL at 09:16

## 2024-06-06 NOTE — CARE COORDINATION
CASE MANAGEMENT DISCHARGE SUMMARY      Discharge to: Home with daughter / s/p ablation/ here from [place]/[facility]        IMM given: (date) today    New Durable Medical Equipment ordered/agency: none    Transportation:    Family/car: on arrival    Confirmed discharge plan with: MD/RN     Patient: yes     Family:  daughter on way       RN, name: Radha Adams

## 2024-06-06 NOTE — PLAN OF CARE
Patient discharge completed. Discharge information included information on diagnosis including signs and symptoms, complications and when to seek medical attention. Information on new medications also provided included use for the medication, side effects and when to call the doctor. Patient verbalized understanding of all discharge information. Patient escorted out by staff with all documented belongings. Home with family.

## 2024-06-06 NOTE — DISCHARGE INSTR - DIET

## 2024-06-06 NOTE — PLAN OF CARE
Doing well post procedure.  Steady on feet.Probable d/c 6/6/24. Has had a slight h/a r/t insufficient caffeine intake today and will try drinking coffee.

## 2024-06-06 NOTE — PLAN OF CARE
Problem: Chronic Conditions and Co-morbidities  Goal: Patient's chronic conditions and co-morbidity symptoms are monitored and maintained or improved  6/6/2024 1333 by Kelin Cohn RN  Outcome: Progressing  6/6/2024 0259 by Kiki Trejo RN  Outcome: Progressing     Problem: Discharge Planning  Goal: Discharge to home or other facility with appropriate resources  6/6/2024 1333 by Kelin Cohn RN  Outcome: Progressing  6/6/2024 0259 by Kiki Trejo RN  Outcome: Progressing     Problem: Pain  Goal: Verbalizes/displays adequate comfort level or baseline comfort level  6/6/2024 1333 by Kelin Cohn RN  Outcome: Progressing  6/6/2024 0259 by Kiki Trejo RN  Outcome: Progressing     Problem: Safety - Adult  Goal: Free from fall injury  Outcome: Progressing     Problem: Cardiovascular - Adult  Goal: Maintains optimal cardiac output and hemodynamic stability  Outcome: Progressing  Goal: Absence of cardiac dysrhythmias or at baseline  Outcome: Progressing     Problem: Musculoskeletal - Adult  Goal: Return mobility to safest level of function  Outcome: Progressing

## 2024-06-07 LAB — ECHO BSA: 1.74 M2

## 2024-06-11 DIAGNOSIS — E78.2 MIXED HYPERLIPIDEMIA: ICD-10-CM

## 2024-06-11 DIAGNOSIS — I25.10 ATHEROSCLEROSIS OF NATIVE CORONARY ARTERY OF NATIVE HEART WITHOUT ANGINA PECTORIS: ICD-10-CM

## 2024-06-11 NOTE — TELEPHONE ENCOUNTER
Refill    rosuvastatin (CRESTOR) 40 MG tablet   apixaban (ELIQUIS) 5 MG TABS tablet   [address] - P [phone] - f [phone]   Last 3/13/24  Next 9/19/24  Labs 6/5/24

## 2024-06-12 ENCOUNTER — TELEPHONE (OUTPATIENT)
Dept: PULMONOLOGY | Age: 69
End: 2024-06-12

## 2024-06-12 RX ORDER — ROSUVASTATIN CALCIUM 40 MG/1
40 TABLET, COATED ORAL EVERY EVENING
Qty: 90 TABLET | Refills: 3 | Status: SHIPPED | OUTPATIENT
Start: 2024-06-12

## 2024-06-12 NOTE — TELEPHONE ENCOUNTER
Pt called and is requesting to be released from Dr. Jade's care. She is going to start seeing Dr. Monzon in [place] due to location.

## 2024-06-18 ENCOUNTER — OFFICE VISIT (OUTPATIENT)
Dept: PULMONOLOGY | Age: 69
End: 2024-06-18
Payer: MEDICARE

## 2024-06-18 VITALS
HEIGHT: 66 IN | SYSTOLIC BLOOD PRESSURE: 125 MMHG | HEART RATE: 68 BPM | WEIGHT: 144 LBS | BODY MASS INDEX: 23.14 KG/M2 | RESPIRATION RATE: 16 BRPM | OXYGEN SATURATION: 95 % | TEMPERATURE: 97.6 F | DIASTOLIC BLOOD PRESSURE: 75 MMHG

## 2024-06-18 DIAGNOSIS — I25.10 CORONARY ARTERY CALCIFICATION SEEN ON CT SCAN: ICD-10-CM

## 2024-06-18 DIAGNOSIS — J84.10 GRANULOMATOUS LUNG DISEASE (HCC): ICD-10-CM

## 2024-06-18 DIAGNOSIS — G47.33 OSA (OBSTRUCTIVE SLEEP APNEA): ICD-10-CM

## 2024-06-18 DIAGNOSIS — Z87.891 FORMER SMOKER, STOPPED SMOKING IN DISTANT PAST: ICD-10-CM

## 2024-06-18 DIAGNOSIS — J31.0 CHRONIC RHINITIS: ICD-10-CM

## 2024-06-18 DIAGNOSIS — R91.1 LUNG NODULE: Primary | ICD-10-CM

## 2024-06-18 PROCEDURE — 1036F TOBACCO NON-USER: CPT | Performed by: INTERNAL MEDICINE

## 2024-06-18 PROCEDURE — 1123F ACP DISCUSS/DSCN MKR DOCD: CPT | Performed by: INTERNAL MEDICINE

## 2024-06-18 PROCEDURE — 3078F DIAST BP <80 MM HG: CPT | Performed by: INTERNAL MEDICINE

## 2024-06-18 PROCEDURE — 3017F COLORECTAL CA SCREEN DOC REV: CPT | Performed by: INTERNAL MEDICINE

## 2024-06-18 PROCEDURE — G8400 PT W/DXA NO RESULTS DOC: HCPCS | Performed by: INTERNAL MEDICINE

## 2024-06-18 PROCEDURE — 1111F DSCHRG MED/CURRENT MED MERGE: CPT | Performed by: INTERNAL MEDICINE

## 2024-06-18 PROCEDURE — 3074F SYST BP LT 130 MM HG: CPT | Performed by: INTERNAL MEDICINE

## 2024-06-18 PROCEDURE — G8420 CALC BMI NORM PARAMETERS: HCPCS | Performed by: INTERNAL MEDICINE

## 2024-06-18 PROCEDURE — 1090F PRES/ABSN URINE INCON ASSESS: CPT | Performed by: INTERNAL MEDICINE

## 2024-06-18 PROCEDURE — G8427 DOCREV CUR MEDS BY ELIG CLIN: HCPCS | Performed by: INTERNAL MEDICINE

## 2024-06-18 PROCEDURE — 99204 OFFICE O/P NEW MOD 45 MIN: CPT | Performed by: INTERNAL MEDICINE

## 2024-06-18 ASSESSMENT — SLEEP AND FATIGUE QUESTIONNAIRES
NECK CIRCUMFERENCE (INCHES): 16
HOW LIKELY ARE YOU TO NOD OFF OR FALL ASLEEP WHILE LYING DOWN TO REST IN THE AFTERNOON WHEN CIRCUMSTANCES PERMIT: SLIGHT CHANCE OF DOZING
HOW LIKELY ARE YOU TO NOD OFF OR FALL ASLEEP IN A CAR, WHILE STOPPED FOR A FEW MINUTES IN TRAFFIC: WOULD NEVER DOZE
HOW LIKELY ARE YOU TO NOD OFF OR FALL ASLEEP WHILE SITTING QUIETLY AFTER LUNCH WITHOUT ALCOHOL: WOULD NEVER DOZE
HOW LIKELY ARE YOU TO NOD OFF OR FALL ASLEEP WHILE SITTING AND TALKING TO SOMEONE: WOULD NEVER DOZE
HOW LIKELY ARE YOU TO NOD OFF OR FALL ASLEEP WHILE SITTING INACTIVE IN A PUBLIC PLACE: WOULD NEVER DOZE
HOW LIKELY ARE YOU TO NOD OFF OR FALL ASLEEP WHEN YOU ARE A PASSENGER IN A CAR FOR AN HOUR WITHOUT A BREAK: WOULD NEVER DOZE
HOW LIKELY ARE YOU TO NOD OFF OR FALL ASLEEP WHILE WATCHING TV: SLIGHT CHANCE OF DOZING

## 2024-06-18 NOTE — PATIENT INSTRUCTIONS
Remember to bring a list of pulmonary medications and any CPAP or BiPAP machines to your next appointment with the office.     Please keep all of your future appointments scheduled by [facility], [place] [facility] office. Out of respect for other patients and providers, you may be asked to reschedule your appointment if you arrive later than your scheduled appointment time. Appointments cancelled less than 24hrs in advance will be considered a no show. Patients with three missed appointments within 1 year or four missed appointments within 2 years can be dismissed from the practice.     Please be aware that our physicians are required to work in the Intensive Care Unit at [facility].  Your appointment may need to be rescheduled if they are designated to work during your appointment time.      You may receive a survey regarding the care you received during your visit.  Your input is valuable to us.  We encourage you to complete and return your survey.  We hope you will choose us in the future for your healthcare needs.     Pt instructed of all future appointment dates & times, including radiology, labs, procedures & referrals. If procedures were scheduled preparation instructions provided. Instructions on future appointments with [facility] were given.      In the next few weeks, you will be receiving a survey from [facility] regarding your visit today.  We would greatly appreciate it if you would take just a few minutes to fill that out.  It is very important to us that our patients receive top notch care and our surveys help keep us accountable. However, if your experience was not a good one, we want to hear about that as well. This is a key way we can keep track of problems and strive to correct any for future visits.    Again, we appreciate your time and thank you for choosing [facility]!    MARY Haque

## 2024-06-18 NOTE — PROGRESS NOTES
MA Communication:  The following orders are received by verbal communication from Luis Monzon MD    Orders include:    6 month lungs  ctwo

## 2024-06-18 NOTE — PROGRESS NOTES
Chief Complaint/Referring Provider:  Patient is being seen at the request of MARIA R Shaw for a consultation for lung nodule     Presenting HPI: Patient is a 68-year-old female who has been referred to the office for a pulm evaluation for abnormal CT of the chest  Patient states that she was having syncope and near syncope along with dizziness and shortness of breath along with increased fatigability and patient was evaluated by EP and was found to have significant symptomatic PVCs and patient underwent evaluation done and patient underwent a PVC ablation also at the same time patient was evaluated and was found to have a lung nodule for which reason patient was referred to this office, patient states that this time of previous evaluation patient was told that her right lower chamber of the heart was not getting enough blood, patient states that she also had elevated blood pressure which was significant, patient states that with the ablation her symptoms have improved significantly, patient does not have any syncope or near syncope, patient also states that she has been having some left ear fullness and sinus congestion which seems to be also improving with the PVC ablation, patient does have chronic sinus issues and patient is taking Flonase and plain Zyrtec and no decongestant for the same, patient does not have any significant expectoration which is purulent or hemoptysis, patient does not have any significant chest pain or palpitations, patient does not have any significant fever or chills, patient does not have any abdominal symptoms of concern, no increasing leg edema, patient does have a funny feeling in the legs in the calf region, patient does not have any history of birds being pets, patient had pets in the past but has not had any pets for the last 2 years, patient does not have any nocturnal sweats, patient does not have any loss of appetite or loss of weight, patient states that she quit

## 2024-07-02 ENCOUNTER — TELEPHONE (OUTPATIENT)
Dept: FAMILY MEDICINE CLINIC | Age: 69
End: 2024-07-02

## 2024-07-02 NOTE — TELEPHONE ENCOUNTER
Patient ROSA Reyez informed that dr mccain would only see as an office visit one time. Per Ts is not an acute appt. Informed her the next opening would be 7/22/2024. They stated that would not work since she is leaving Friday.

## 2024-07-02 NOTE — TELEPHONE ENCOUNTER
The patient's EC and your patient, Aissatou Vogel called to get an appointment for sister to get an adjustment by you for hip pain. She is visiting from [place]. Can we schedule this or not?

## 2024-07-17 RX ORDER — HYDROCHLOROTHIAZIDE 25 MG/1
25 TABLET ORAL EVERY MORNING
Qty: 90 TABLET | Refills: 0 | Status: SHIPPED | OUTPATIENT
Start: 2024-07-17

## 2024-07-23 ENCOUNTER — NURSE ONLY (OUTPATIENT)
Dept: CARDIOLOGY CLINIC | Age: 69
End: 2024-07-23

## 2024-07-23 ENCOUNTER — OFFICE VISIT (OUTPATIENT)
Dept: CARDIOLOGY CLINIC | Age: 69
End: 2024-07-23
Payer: COMMERCIAL

## 2024-07-23 VITALS
HEART RATE: 66 BPM | WEIGHT: 147.2 LBS | HEIGHT: 66 IN | BODY MASS INDEX: 23.66 KG/M2 | SYSTOLIC BLOOD PRESSURE: 118 MMHG | DIASTOLIC BLOOD PRESSURE: 64 MMHG

## 2024-07-23 DIAGNOSIS — I49.3 PVC (PREMATURE VENTRICULAR CONTRACTION): Primary | ICD-10-CM

## 2024-07-23 DIAGNOSIS — I48.0 PAROXYSMAL ATRIAL FIBRILLATION (HCC): ICD-10-CM

## 2024-07-23 DIAGNOSIS — Z45.09 ENCOUNTER FOR LOOP RECORDER CHECK: Primary | ICD-10-CM

## 2024-07-23 DIAGNOSIS — R00.2 PALPITATIONS: ICD-10-CM

## 2024-07-23 DIAGNOSIS — I49.3 PREMATURE VENTRICULAR BEAT: ICD-10-CM

## 2024-07-23 PROCEDURE — 3017F COLORECTAL CA SCREEN DOC REV: CPT | Performed by: INTERNAL MEDICINE

## 2024-07-23 PROCEDURE — G8427 DOCREV CUR MEDS BY ELIG CLIN: HCPCS | Performed by: INTERNAL MEDICINE

## 2024-07-23 PROCEDURE — 1123F ACP DISCUSS/DSCN MKR DOCD: CPT | Performed by: INTERNAL MEDICINE

## 2024-07-23 PROCEDURE — 3074F SYST BP LT 130 MM HG: CPT | Performed by: INTERNAL MEDICINE

## 2024-07-23 PROCEDURE — 1090F PRES/ABSN URINE INCON ASSESS: CPT | Performed by: INTERNAL MEDICINE

## 2024-07-23 PROCEDURE — 3078F DIAST BP <80 MM HG: CPT | Performed by: INTERNAL MEDICINE

## 2024-07-23 PROCEDURE — 1036F TOBACCO NON-USER: CPT | Performed by: INTERNAL MEDICINE

## 2024-07-23 PROCEDURE — G8420 CALC BMI NORM PARAMETERS: HCPCS | Performed by: INTERNAL MEDICINE

## 2024-07-23 PROCEDURE — 99214 OFFICE O/P EST MOD 30 MIN: CPT | Performed by: INTERNAL MEDICINE

## 2024-07-23 PROCEDURE — G8400 PT W/DXA NO RESULTS DOC: HCPCS | Performed by: INTERNAL MEDICINE

## 2024-07-23 PROCEDURE — 93000 ELECTROCARDIOGRAM COMPLETE: CPT | Performed by: INTERNAL MEDICINE

## 2024-07-23 RX ORDER — SOTALOL HYDROCHLORIDE 80 MG/1
40 TABLET ORAL DAILY
Qty: 45 TABLET | Refills: 3 | Status: SHIPPED | OUTPATIENT
Start: 2024-07-23

## 2024-07-23 NOTE — PROGRESS NOTES
[facility]   Electrophysiology Note      Date: 7/23/24  Patient Name: Tyesha Waterman  YOB: 1955     Chief Complaint: Follow-Up from Hospital, Atrial Fibrillation, Device Check, and Other (PVC )      Primary Care Physician: Isaura Wade APRN - CNP     HISTORY OF PRESENT ILLNESS: Tyesha Waterman is a 68 y.o. female who presents for follow up for Atrial Fibrillation and Loop Recorder management.   Medical history significant for CAD, MI,  AFIB, DM, HLD, HTN. She underwent a cardiac catheterization while in [place]. This showed a 100% occlusion of her right coronary artery that has been noted on her previous angiogram in 2003. No intervention was needed at the time as collaterals had formed. She returned to [place] and followed up with Dr. Cutler. On 1/13/2020 she underwent loop recorder placement due to recurrent episodes of dizziness and palpitations.  Isosorbide was stopped at her visit on 1/27/2020 due to dizziness thought to be related to polypharmacy. Her loop recorder interrogation on 6/2/2020 normal function with no new arrhythmias.     At her office visit on 9/1/20 she stated she wears O2 a night.  She stated she tried wearing a CPAP but is claustrophobic and cannot tolerate it.  She stated she is wearing an oral appliance for her sleep apnea now. She stated she is walking 2 miles 4-5 times a week without issue. During this office visit on 9/1/20 her sotalol was decreased to 20 mg BID and ultimately stopped on 9/3/20. .  On 12/15/20, ECG demonstrates SR with HR 69 BPM. Her device interrogation today 12/15/20 demonstrates no new arrhthymias. Today 12/15/20 she reports she is feeling very well from a cardiac standpoint. She reports she is getting adequate rest at night while wearing her oxygen. She reports due to this, she is much less fatigued during the day. She reports she is taking her Eliquis 5 mg BID as prescribed and tolerates that well. She denies any abnormal bleeding or

## 2024-07-23 NOTE — PATIENT INSTRUCTIONS
RECOMMENDATIONS:  Monthly remote device checks.   Continue current medications.   Follow up in one year with EP NP.

## 2024-07-26 ENCOUNTER — TELEPHONE (OUTPATIENT)
Dept: CARDIOLOGY CLINIC | Age: 69
End: 2024-07-26

## 2024-07-26 DIAGNOSIS — E78.2 MIXED HYPERLIPIDEMIA: ICD-10-CM

## 2024-07-26 NOTE — TELEPHONE ENCOUNTER
Medication refill:    Repatha Sureclick 140mg/ml, Inject 140 mg into the skin every 14 days, 2 Adjustable Dose Pre-filled Pen Syringe. Pt stated her one she has is     Pharmacy:  [facility] [address] [place] [address] -  [phone] -  [phone] [[id number]]     Last ov: 3.13.2024 VSP  Next ov: 2024 VSP

## 2024-07-29 NOTE — TELEPHONE ENCOUNTER
LOV 03/13/24, upcoming 09/2024. Lipid Panel 2020. Needs lipid panel prior to refill. Patient states will wait for refill til upcoming office visit and will obtain fasting labs at [facility] in [place], she reports she has order already.

## 2024-07-30 ENCOUNTER — TELEPHONE (OUTPATIENT)
Dept: ADMINISTRATIVE | Age: 69
End: 2024-07-30

## 2024-09-19 ENCOUNTER — OFFICE VISIT (OUTPATIENT)
Dept: CARDIOLOGY CLINIC | Age: 69
End: 2024-09-19
Payer: COMMERCIAL

## 2024-09-19 VITALS
WEIGHT: 149.5 LBS | DIASTOLIC BLOOD PRESSURE: 84 MMHG | OXYGEN SATURATION: 97 % | BODY MASS INDEX: 24.03 KG/M2 | HEIGHT: 66 IN | HEART RATE: 71 BPM | SYSTOLIC BLOOD PRESSURE: 136 MMHG

## 2024-09-19 DIAGNOSIS — I25.10 ATHEROSCLEROSIS OF NATIVE CORONARY ARTERY OF NATIVE HEART WITHOUT ANGINA PECTORIS: ICD-10-CM

## 2024-09-19 DIAGNOSIS — I48.0 PAROXYSMAL ATRIAL FIBRILLATION (HCC): ICD-10-CM

## 2024-09-19 DIAGNOSIS — I25.10 CORONARY ARTERY DISEASE INVOLVING NATIVE CORONARY ARTERY OF NATIVE HEART WITHOUT ANGINA PECTORIS: ICD-10-CM

## 2024-09-19 DIAGNOSIS — I10 PRIMARY HYPERTENSION: Primary | ICD-10-CM

## 2024-09-19 DIAGNOSIS — I10 ESSENTIAL HYPERTENSION: ICD-10-CM

## 2024-09-19 DIAGNOSIS — E78.2 MIXED HYPERLIPIDEMIA: ICD-10-CM

## 2024-09-19 DIAGNOSIS — E11.9 TYPE 2 DIABETES MELLITUS WITHOUT COMPLICATION, WITHOUT LONG-TERM CURRENT USE OF INSULIN (HCC): ICD-10-CM

## 2024-09-19 PROCEDURE — G8400 PT W/DXA NO RESULTS DOC: HCPCS | Performed by: INTERNAL MEDICINE

## 2024-09-19 PROCEDURE — 3075F SYST BP GE 130 - 139MM HG: CPT | Performed by: INTERNAL MEDICINE

## 2024-09-19 PROCEDURE — G8427 DOCREV CUR MEDS BY ELIG CLIN: HCPCS | Performed by: INTERNAL MEDICINE

## 2024-09-19 PROCEDURE — 3046F HEMOGLOBIN A1C LEVEL >9.0%: CPT | Performed by: INTERNAL MEDICINE

## 2024-09-19 PROCEDURE — 1090F PRES/ABSN URINE INCON ASSESS: CPT | Performed by: INTERNAL MEDICINE

## 2024-09-19 PROCEDURE — 99214 OFFICE O/P EST MOD 30 MIN: CPT | Performed by: INTERNAL MEDICINE

## 2024-09-19 PROCEDURE — G8420 CALC BMI NORM PARAMETERS: HCPCS | Performed by: INTERNAL MEDICINE

## 2024-09-19 PROCEDURE — 1036F TOBACCO NON-USER: CPT | Performed by: INTERNAL MEDICINE

## 2024-09-19 PROCEDURE — 3017F COLORECTAL CA SCREEN DOC REV: CPT | Performed by: INTERNAL MEDICINE

## 2024-09-19 PROCEDURE — 1123F ACP DISCUSS/DSCN MKR DOCD: CPT | Performed by: INTERNAL MEDICINE

## 2024-09-19 PROCEDURE — 2022F DILAT RTA XM EVC RTNOPTHY: CPT | Performed by: INTERNAL MEDICINE

## 2024-09-19 PROCEDURE — 3079F DIAST BP 80-89 MM HG: CPT | Performed by: INTERNAL MEDICINE

## 2024-09-19 RX ORDER — HYDROCHLOROTHIAZIDE 25 MG/1
25 TABLET ORAL EVERY MORNING
Qty: 90 TABLET | Refills: 3 | Status: SHIPPED | OUTPATIENT
Start: 2024-09-19

## 2024-09-19 RX ORDER — LOSARTAN POTASSIUM 50 MG/1
100 TABLET ORAL DAILY
Qty: 180 TABLET | Refills: 3 | Status: SHIPPED | OUTPATIENT
Start: 2024-09-19

## 2024-09-19 RX ORDER — ROSUVASTATIN CALCIUM 40 MG/1
40 TABLET, COATED ORAL EVERY EVENING
Qty: 90 TABLET | Refills: 3 | Status: SHIPPED | OUTPATIENT
Start: 2024-09-19

## 2024-09-19 RX ORDER — EVOLOCUMAB 140 MG/ML
140 INJECTION, SOLUTION SUBCUTANEOUS
Qty: 2 ADJUSTABLE DOSE PRE-FILLED PEN SYRINGE | Refills: 11 | Status: SHIPPED | OUTPATIENT
Start: 2024-09-19

## 2024-10-25 PROCEDURE — 93298 REM INTERROG DEV EVAL SCRMS: CPT | Performed by: INTERNAL MEDICINE

## 2024-11-04 DIAGNOSIS — I48.0 PAROXYSMAL ATRIAL FIBRILLATION (HCC): ICD-10-CM

## 2024-11-04 DIAGNOSIS — I49.3 PVC (PREMATURE VENTRICULAR CONTRACTION): ICD-10-CM

## 2024-11-04 RX ORDER — SOTALOL HYDROCHLORIDE 80 MG/1
40 TABLET ORAL DAILY
Qty: 45 TABLET | Refills: 3 | Status: SHIPPED | OUTPATIENT
Start: 2024-11-04 | End: 2024-11-06 | Stop reason: SDUPTHER

## 2024-11-04 NOTE — TELEPHONE ENCOUNTER
Pt called requesting a 90 day supply plus refills for Sotalol be sent to her mail order [facility] pharmacy. Pt is scheduled for yearly with vsp and agk same day for 9/18/25. Pt prefers to see Agk and resides in [place] majority of time.

## 2024-11-05 ENCOUNTER — TELEPHONE (OUTPATIENT)
Dept: CARDIOLOGY CLINIC | Age: 69
End: 2024-11-05

## 2024-11-05 DIAGNOSIS — I49.3 PVC (PREMATURE VENTRICULAR CONTRACTION): ICD-10-CM

## 2024-11-05 DIAGNOSIS — I48.0 PAROXYSMAL ATRIAL FIBRILLATION (HCC): ICD-10-CM

## 2024-11-05 RX ORDER — SOTALOL HYDROCHLORIDE 80 MG/1
40 TABLET ORAL DAILY
Qty: 45 TABLET | Refills: 1 | OUTPATIENT
Start: 2024-11-05

## 2024-11-05 NOTE — TELEPHONE ENCOUNTER
Per my note, it seems she tolerates low dose sotalol.  Please follow up with her and confirm what she is taking and how she is doing with that dose.

## 2024-11-05 NOTE — TELEPHONE ENCOUNTER
Patient has allergy to Sotalol documented. It states that she only experiences nausea with medication. Chart shows that she has been on it in the past. Sending to [facility] to have documented verification if it is okay.

## 2024-11-05 NOTE — TELEPHONE ENCOUNTER
Celeste from pts pharmacy stated that the Sotalol has a listed allergy for pt and they would like to know if its ok to proceed with filling.      is [phone]   Reference number is [id number]

## 2024-11-05 NOTE — TELEPHONE ENCOUNTER
Last OV 07/23/2024  Upcoming OV 09/18/2025  EKG 07/2024      Per separate encounter.     Verna Figueroa RN JD    11/5/24  4:39 PM  Note     Patient has allergy to Sotalol documented. It states that she only experiences nausea with medication. Chart shows that she has been on it in the past. Sending to [facility] to have documented verification if it is okay.

## 2024-11-06 RX ORDER — SOTALOL HYDROCHLORIDE 80 MG/1
40 TABLET ORAL DAILY
Qty: 45 TABLET | Refills: 3 | Status: SHIPPED | OUTPATIENT
Start: 2024-11-06

## 2024-11-06 NOTE — TELEPHONE ENCOUNTER
Pt called and stated that her pharmacy isn't filling her medication even at the 40mg dose now. Requested for med staff to contact pharmacy to see what's going on.   [facility] - P [phone]       Pt said that if the [facility] continues to be difficult to call it in to [facility] pharmacy in [facility]

## 2024-11-06 NOTE — TELEPHONE ENCOUNTER
Noted. Sent new RX to pharmacy per patient request-continue Sotalol 40mg as patient tolerates. Thanks.

## 2024-11-06 NOTE — TELEPHONE ENCOUNTER
Pt returned call. Message given. V/U and is ok with taking Sotal at 40mg. She said that [facility] is going to need a new prescription sent.

## 2024-11-06 NOTE — TELEPHONE ENCOUNTER
Called and spoke with Jory who transferred me to [facility]. [facility] stated order was being filled. Shipping either today or tomorrow.     Called and informed patient who gave V/U

## 2024-11-19 DIAGNOSIS — I49.3 PVC (PREMATURE VENTRICULAR CONTRACTION): ICD-10-CM

## 2024-11-19 DIAGNOSIS — I48.0 PAROXYSMAL ATRIAL FIBRILLATION (HCC): ICD-10-CM

## 2024-11-19 RX ORDER — SOTALOL HYDROCHLORIDE 80 MG/1
TABLET ORAL
Qty: 45 TABLET | Refills: 0 | OUTPATIENT
Start: 2024-11-19

## 2025-01-06 PROCEDURE — 93298 REM INTERROG DEV EVAL SCRMS: CPT | Performed by: INTERNAL MEDICINE

## 2025-02-14 ENCOUNTER — TELEPHONE (OUTPATIENT)
Dept: CARDIOLOGY CLINIC | Age: 70
End: 2025-02-14

## 2025-02-14 DIAGNOSIS — I25.10 CORONARY ARTERY DISEASE INVOLVING NATIVE CORONARY ARTERY OF NATIVE HEART WITHOUT ANGINA PECTORIS: ICD-10-CM

## 2025-02-14 DIAGNOSIS — Z79.899 MEDICATION MANAGEMENT: Primary | ICD-10-CM

## 2025-02-14 DIAGNOSIS — E78.2 MIXED HYPERLIPIDEMIA: ICD-10-CM

## 2025-02-14 NOTE — TELEPHONE ENCOUNTER
Pt states at last OV with [facility] there was a discussion of pt getting lab work done before seeing endocrinologist. Pt states she needs a CBC and thyroid. Orders not in chart. Please advise.

## 2025-02-24 ENCOUNTER — HOSPITAL ENCOUNTER (OUTPATIENT)
Dept: CT IMAGING | Age: 70
Discharge: HOME | End: 2025-02-24
Attending: INTERNAL MEDICINE
Payer: MEDICARE

## 2025-02-24 DIAGNOSIS — R91.1 LUNG NODULE: ICD-10-CM

## 2025-02-24 PROCEDURE — 71250 CT THORAX DX C-: CPT

## 2025-02-25 ENCOUNTER — TELEPHONE (OUTPATIENT)
Dept: CARDIOLOGY CLINIC | Age: 70
End: 2025-02-25

## 2025-02-25 NOTE — TELEPHONE ENCOUNTER
Pt is scheduled for a root canal apicoectomy on 3/10/25 with Dr. Phelps. They would like pt to be off of Eliquis and the baby Asprin for 7 days prior. Phone is [phone]. Fax is [phone].     Leno 9/19/24 ISA[facility]

## 2025-02-26 NOTE — TELEPHONE ENCOUNTER
The patient's cardiac condition is not prohibitory to undergo surgery. The patient's cardiac risk is moderate based upon my evaluation and testing. Stable to proceed.     The patient only needs to hold Eliquis for 48 hours and aspirin for 5 days

## 2025-02-26 NOTE — TELEPHONE ENCOUNTER
Patient last office visit 09/19/2024. Please advise on cardiac clearance, and request to hold aspirin and eliquis 7 days prior.

## 2025-02-27 NOTE — TELEPHONE ENCOUNTER
The phone number that our  put is inaccurate.    In any case, for a low risk procedure such as a root canal.  We would not want the patient to hold the medications beyond what we have recommended.  We will not be changing our recommendations for something such as a root canal.  This is our final decision.

## 2025-02-27 NOTE — TELEPHONE ENCOUNTER
Dr. Phelps contacted office stating blood thinners need to be held for 7 days, holding for only 48 days on eliquis and 5 days ASA \"puts pt at risk for increased bleeding\". Dr. Phelps would like clarification and reasoning why.     Return call is [phone] ask for Kiley

## 2025-02-28 NOTE — TELEPHONE ENCOUNTER
Attempted to call [phone]  office to relay [facility] message. No answer. Will need to attempt at later time to reach. Left VM.

## 2025-03-03 NOTE — TELEPHONE ENCOUNTER
Kelle from Dr. Phelps's office returned call. Message given. Kelle would like this decision in a letter and faxed to their office at [phone]. Please advise.

## 2025-03-04 ENCOUNTER — OFFICE VISIT (OUTPATIENT)
Dept: PULMONOLOGY | Age: 70
End: 2025-03-04
Payer: MEDICARE

## 2025-03-04 VITALS
BODY MASS INDEX: 23.11 KG/M2 | WEIGHT: 147.25 LBS | DIASTOLIC BLOOD PRESSURE: 90 MMHG | HEART RATE: 75 BPM | SYSTOLIC BLOOD PRESSURE: 148 MMHG | RESPIRATION RATE: 16 BRPM | OXYGEN SATURATION: 96 % | HEIGHT: 67 IN | TEMPERATURE: 98.1 F

## 2025-03-04 DIAGNOSIS — Z87.891 FORMER SMOKER: ICD-10-CM

## 2025-03-04 DIAGNOSIS — R91.8 LUNG NODULES: Primary | ICD-10-CM

## 2025-03-04 PROCEDURE — G2211 COMPLEX E/M VISIT ADD ON: HCPCS | Performed by: STUDENT IN AN ORGANIZED HEALTH CARE EDUCATION/TRAINING PROGRAM

## 2025-03-04 PROCEDURE — 99214 OFFICE O/P EST MOD 30 MIN: CPT | Performed by: STUDENT IN AN ORGANIZED HEALTH CARE EDUCATION/TRAINING PROGRAM

## 2025-03-04 PROCEDURE — G8420 CALC BMI NORM PARAMETERS: HCPCS | Performed by: STUDENT IN AN ORGANIZED HEALTH CARE EDUCATION/TRAINING PROGRAM

## 2025-03-04 PROCEDURE — 1036F TOBACCO NON-USER: CPT | Performed by: STUDENT IN AN ORGANIZED HEALTH CARE EDUCATION/TRAINING PROGRAM

## 2025-03-04 PROCEDURE — 1123F ACP DISCUSS/DSCN MKR DOCD: CPT | Performed by: STUDENT IN AN ORGANIZED HEALTH CARE EDUCATION/TRAINING PROGRAM

## 2025-03-04 PROCEDURE — 1159F MED LIST DOCD IN RCRD: CPT | Performed by: STUDENT IN AN ORGANIZED HEALTH CARE EDUCATION/TRAINING PROGRAM

## 2025-03-04 PROCEDURE — G8427 DOCREV CUR MEDS BY ELIG CLIN: HCPCS | Performed by: STUDENT IN AN ORGANIZED HEALTH CARE EDUCATION/TRAINING PROGRAM

## 2025-03-04 PROCEDURE — 3017F COLORECTAL CA SCREEN DOC REV: CPT | Performed by: STUDENT IN AN ORGANIZED HEALTH CARE EDUCATION/TRAINING PROGRAM

## 2025-03-04 PROCEDURE — 1090F PRES/ABSN URINE INCON ASSESS: CPT | Performed by: STUDENT IN AN ORGANIZED HEALTH CARE EDUCATION/TRAINING PROGRAM

## 2025-03-04 PROCEDURE — 3077F SYST BP >= 140 MM HG: CPT | Performed by: STUDENT IN AN ORGANIZED HEALTH CARE EDUCATION/TRAINING PROGRAM

## 2025-03-04 PROCEDURE — 3080F DIAST BP >= 90 MM HG: CPT | Performed by: STUDENT IN AN ORGANIZED HEALTH CARE EDUCATION/TRAINING PROGRAM

## 2025-03-04 PROCEDURE — G8400 PT W/DXA NO RESULTS DOC: HCPCS | Performed by: STUDENT IN AN ORGANIZED HEALTH CARE EDUCATION/TRAINING PROGRAM

## 2025-03-04 RX ORDER — TRIAMCINOLONE ACETONIDE 1 MG/G
OINTMENT TOPICAL
COMMUNITY
Start: 2025-01-10

## 2025-03-04 RX ORDER — BENZONATATE 100 MG/1
CAPSULE ORAL
COMMUNITY
Start: 2024-12-23

## 2025-03-04 ASSESSMENT — ENCOUNTER SYMPTOMS
EYE ITCHING: 0
VOMITING: 0
WHEEZING: 0
EYE REDNESS: 0
BACK PAIN: 0
SHORTNESS OF BREATH: 0
SORE THROAT: 0
ABDOMINAL DISTENTION: 0
NAUSEA: 0
STRIDOR: 0
COLOR CHANGE: 0
EYE DISCHARGE: 0
EYE PAIN: 0
CONSTIPATION: 0
COUGH: 0
DIARRHEA: 0
ABDOMINAL PAIN: 0
TROUBLE SWALLOWING: 0

## 2025-03-04 NOTE — PATIENT INSTRUCTIONS
Remember to bring a list of pulmonary medications and any CPAP or BiPAP machines to your next appointment with the office.     Please keep all of your future appointments scheduled by [facility] office. Out of respect for other patients and providers, you may be asked to reschedule your appointment if you arrive later than your scheduled appointment time. Appointments cancelled less than 24hrs in advance will be considered a no show. Patients with three missed appointments within 1 year or four missed appointments within 2 years can be dismissed from the practice.     Please be aware that our physicians are required to work in the Intensive Care Unit at [facility].  Your appointment may need to be rescheduled if they are designated to work during your appointment time.      You may receive a survey regarding the care you received during your visit.  Your input is valuable to us.  We encourage you to complete and return your survey.  We hope you will choose us in the future for your healthcare needs.     Pt instructed of all future appointment dates & times, including radiology, labs, procedures & referrals. If procedures were scheduled preparation instructions provided. Instructions on future appointments with [facility] were given.

## 2025-03-04 NOTE — PROGRESS NOTES
MA Communication:  The following orders are received by verbal communication from Martin Delaney MD    Orders include:    6 month pulm  Ct same day     
bibasilar scarring.     However, there is an enlarging 6 mm solid right lower lobe pulmonary nodule,  previously measuring 3 mm.     Upper Abdomen: Status post cholecystectomy.     Soft Tissues/Bones: Degenerative changes involve the thoracic spine and  bilateral glenohumeral joints.  Status post right rotator cuff repair.  A  cardiac digital recorder device is insitu.     IMPRESSION:  1. 6 mm enlarging solid right pulmonary nodule.     RECOMMENDATIONS:  Fleischner Society guidelines for follow-up and management of incidentally  detected pulmonary nodules:     Single Solid Nodule:     Nodule size equals 6-8 mm  In a low-risk patient, CT at 6-12 months, then consider CT at 18-24 months.  In a high-risk patient, CT at 6-12 months, then CT at 18-24 months.         ASSESSMENT:  Encounter Diagnoses   Name Primary?    Lung nodules Yes    Former smoker      Plan:  - Reviewed CT imaging of GG nodules. The RLL GG nodule that is medial appears stable to prior but has some solid components. Will cont surveillance with CT chest w/o con in 6 months with f/u in 6 months. If stable, can switch to yearly f/u  - Cont smoking cessation    Return in about 6 months (around 9/4/2025).       Martin Delaney MD, [facility]

## 2025-03-05 ENCOUNTER — OFFICE VISIT (OUTPATIENT)
Dept: ENDOCRINOLOGY | Age: 70
End: 2025-03-05
Payer: MEDICARE

## 2025-03-05 VITALS
HEART RATE: 86 BPM | WEIGHT: 148 LBS | OXYGEN SATURATION: 96 % | SYSTOLIC BLOOD PRESSURE: 132 MMHG | RESPIRATION RATE: 15 BRPM | DIASTOLIC BLOOD PRESSURE: 86 MMHG | BODY MASS INDEX: 23.53 KG/M2

## 2025-03-05 DIAGNOSIS — E11.9 CONTROLLED TYPE 2 DIABETES MELLITUS WITHOUT COMPLICATION, WITHOUT LONG-TERM CURRENT USE OF INSULIN (HCC): Primary | ICD-10-CM

## 2025-03-05 DIAGNOSIS — I10 PRIMARY HYPERTENSION: ICD-10-CM

## 2025-03-05 PROCEDURE — 3075F SYST BP GE 130 - 139MM HG: CPT | Performed by: INTERNAL MEDICINE

## 2025-03-05 PROCEDURE — G8420 CALC BMI NORM PARAMETERS: HCPCS | Performed by: INTERNAL MEDICINE

## 2025-03-05 PROCEDURE — G2211 COMPLEX E/M VISIT ADD ON: HCPCS | Performed by: INTERNAL MEDICINE

## 2025-03-05 PROCEDURE — 1036F TOBACCO NON-USER: CPT | Performed by: INTERNAL MEDICINE

## 2025-03-05 PROCEDURE — 99204 OFFICE O/P NEW MOD 45 MIN: CPT | Performed by: INTERNAL MEDICINE

## 2025-03-05 PROCEDURE — G8400 PT W/DXA NO RESULTS DOC: HCPCS | Performed by: INTERNAL MEDICINE

## 2025-03-05 PROCEDURE — 2022F DILAT RTA XM EVC RTNOPTHY: CPT | Performed by: INTERNAL MEDICINE

## 2025-03-05 PROCEDURE — 1159F MED LIST DOCD IN RCRD: CPT | Performed by: INTERNAL MEDICINE

## 2025-03-05 PROCEDURE — 3079F DIAST BP 80-89 MM HG: CPT | Performed by: INTERNAL MEDICINE

## 2025-03-05 PROCEDURE — 3046F HEMOGLOBIN A1C LEVEL >9.0%: CPT | Performed by: INTERNAL MEDICINE

## 2025-03-05 PROCEDURE — G8427 DOCREV CUR MEDS BY ELIG CLIN: HCPCS | Performed by: INTERNAL MEDICINE

## 2025-03-05 PROCEDURE — 1123F ACP DISCUSS/DSCN MKR DOCD: CPT | Performed by: INTERNAL MEDICINE

## 2025-03-05 PROCEDURE — 3017F COLORECTAL CA SCREEN DOC REV: CPT | Performed by: INTERNAL MEDICINE

## 2025-03-05 PROCEDURE — 1090F PRES/ABSN URINE INCON ASSESS: CPT | Performed by: INTERNAL MEDICINE

## 2025-03-05 NOTE — PROGRESS NOTES
HPI      Tyesha Waterman is a 69 y.o. female who is here for evaluation of uncontrolled DM.    Seen as new patient    On Rybelsus 7mg    Working well    States did not want to continue Janumet  Concerned about fluctuation  Reports leakage of proteins, has seen nephrology  Was told due to DM    Last A1c 6.5% in [place]----> 5.9%---> 6.6%    Patient has a PMH of Type 2 DM, hypertension, hyperlipidemia, CAD, hypothyroidism     Diagnosed with Diabetes Mellitus type 2 > 20 yrs.   Course has been variable .    Microvascular complications: No known retinopathy (Last eye exam: 2019 )   No  Nephropathy   No Peripheral neuropathy    Home regimen:    Rybelsus 7mg    Previous medications: Glucophage XR  Allergic to metformin.   Farxiga ( yeast infection )   Victoza ( rash )   Janumet    Blood glucose trend    100s    Mild, controlled      Diet: Eats 3 meals/day.    Nutrition education: Yes  Exercise: Walks       Hyperlipidemia: She is on rosuvastatin 40 mg daily, Repatha 160 mg SQ every 2 weeks.   LDL 38 on 2/25     Hypertension: She is on Losartan 50 mg daily.     She is on levothyroxine 25mcg for hypothyroidism    TSH 1.53    Feeling in feet and lower leg as loose skin    Eye exam: 9/23    SH: Lives here and in [place]    ROS: Scanned, reviewed    Past Medical History:   Diagnosis Date    A-fib (HCC)     Anemia     CAD (coronary artery disease) 2009    Cholelithiasis 8/2011    Complication of anesthesia     Long time to wake up    Diabetes mellitus (HCC)     GERD (gastroesophageal reflux disease)     Granulomatous lung disease (HCC) 6/18/2024    Hyperlipidemia     Hypertension     MI (myocardial infarction) ([facility]) 2004    Nausea & vomiting     ARACELIS (obstructive sleep apnea)     does not use CPAP  (has had some recent weight loss)    PONV (postoperative nausea and vomiting)     Primary osteoarthritis of right hip 6/7/2018    Prolonged emergence from general anesthesia     Seasonal allergies      Past Surgical History:

## 2025-04-28 DIAGNOSIS — E78.2 MIXED HYPERLIPIDEMIA: ICD-10-CM

## 2025-04-28 RX ORDER — EVOLOCUMAB 140 MG/ML
140 INJECTION, SOLUTION SUBCUTANEOUS
Qty: 2 ADJUSTABLE DOSE PRE-FILLED PEN SYRINGE | Refills: 11 | Status: SHIPPED | OUTPATIENT
Start: 2025-04-28

## 2025-04-28 NOTE — TELEPHONE ENCOUNTER
Last Office Visit: 9/19/2024 Provider: ISAAC  **Is provider OOT? No    Next Office Visit: 9/18/2025 Provider: ISAAC    LAST LABS:   CE lipid and hepatic  Lab orders needed? no

## 2025-06-04 PROCEDURE — 93298 REM INTERROG DEV EVAL SCRMS: CPT | Performed by: INTERNAL MEDICINE

## 2025-06-19 ENCOUNTER — HOSPITAL ENCOUNTER (EMERGENCY)
Age: 70
Discharge: HOME | End: 2025-06-19
Attending: STUDENT IN AN ORGANIZED HEALTH CARE EDUCATION/TRAINING PROGRAM
Payer: MEDICARE

## 2025-06-19 ENCOUNTER — APPOINTMENT (OUTPATIENT)
Dept: CT IMAGING | Age: 70
End: 2025-06-19
Payer: MEDICARE

## 2025-06-19 VITALS
SYSTOLIC BLOOD PRESSURE: 126 MMHG | RESPIRATION RATE: 19 BRPM | TEMPERATURE: 98.1 F | BODY MASS INDEX: 21.97 KG/M2 | DIASTOLIC BLOOD PRESSURE: 90 MMHG | HEART RATE: 82 BPM | OXYGEN SATURATION: 98 % | WEIGHT: 140 LBS | HEIGHT: 67 IN

## 2025-06-19 DIAGNOSIS — E83.42 HYPOMAGNESEMIA: ICD-10-CM

## 2025-06-19 DIAGNOSIS — E87.6 HYPOKALEMIA: ICD-10-CM

## 2025-06-19 DIAGNOSIS — R10.9 FLANK PAIN: Primary | ICD-10-CM

## 2025-06-19 DIAGNOSIS — E87.1 HYPONATREMIA: ICD-10-CM

## 2025-06-19 DIAGNOSIS — R42 DIZZINESS: ICD-10-CM

## 2025-06-19 LAB
ALBUMIN SERPL-MCNC: 3.2 G/DL (ref 3.4–5)
ALBUMIN/GLOB SERPL: 1.5 {RATIO} (ref 1.1–2.2)
ALP SERPL-CCNC: 52 U/L (ref 40–129)
ALT SERPL-CCNC: 26 U/L (ref 10–40)
AMORPH SED URNS QL MICRO: ABNORMAL /HPF
ANION GAP SERPL CALCULATED.3IONS-SCNC: 11 MMOL/L (ref 3–16)
ANION GAP SERPL CALCULATED.3IONS-SCNC: 14 MMOL/L (ref 3–16)
AST SERPL-CCNC: 28 U/L (ref 15–37)
BACTERIA URNS QL MICRO: ABNORMAL /HPF
BASE EXCESS BLDV CALC-SCNC: 3 MMOL/L (ref -3–3)
BASE EXCESS BLDV CALC-SCNC: 3.5 MMOL/L (ref -3–3)
BASOPHILS # BLD: 0.1 K/UL (ref 0–0.2)
BASOPHILS NFR BLD: 0.8 %
BILIRUB SERPL-MCNC: 0.7 MG/DL (ref 0–1)
BILIRUB UR QL STRIP.AUTO: NEGATIVE
BUN SERPL-MCNC: 10 MG/DL (ref 7–20)
BUN SERPL-MCNC: 9 MG/DL (ref 7–20)
CALCIUM SERPL-MCNC: 7.3 MG/DL (ref 8.3–10.6)
CALCIUM SERPL-MCNC: 9.3 MG/DL (ref 8.3–10.6)
CHLORIDE SERPL-SCNC: 90 MMOL/L (ref 99–110)
CHLORIDE SERPL-SCNC: 98 MMOL/L (ref 99–110)
CLARITY UR: CLEAR
CO2 BLDV-SCNC: 27 MMOL/L
CO2 BLDV-SCNC: 28 MMOL/L
CO2 SERPL-SCNC: 17 MMOL/L (ref 21–32)
CO2 SERPL-SCNC: 24 MMOL/L (ref 21–32)
COHGB MFR BLDV: 1 % (ref 0–1.5)
COHGB MFR BLDV: 1.1 % (ref 0–1.5)
COLOR UR: YELLOW
CREAT SERPL-MCNC: 0.5 MG/DL (ref 0.6–1.2)
CREAT SERPL-MCNC: 0.6 MG/DL (ref 0.6–1.2)
DEPRECATED RDW RBC AUTO: 13.7 % (ref 12.4–15.4)
EOSINOPHIL # BLD: 0.1 K/UL (ref 0–0.6)
EOSINOPHIL NFR BLD: 0.9 %
EPI CELLS #/AREA URNS HPF: ABNORMAL /HPF (ref 0–5)
GFR SERPLBLD CREATININE-BSD FMLA CKD-EPI: >90 ML/MIN/{1.73_M2}
GFR SERPLBLD CREATININE-BSD FMLA CKD-EPI: >90 ML/MIN/{1.73_M2}
GLUCOSE SERPL-MCNC: 111 MG/DL (ref 70–99)
GLUCOSE SERPL-MCNC: 118 MG/DL (ref 70–99)
GLUCOSE UR STRIP.AUTO-MCNC: NEGATIVE MG/DL
HCO3 BLDV-SCNC: 26.2 MMOL/L (ref 23–29)
HCO3 BLDV-SCNC: 27.1 MMOL/L (ref 23–29)
HCT VFR BLD AUTO: 36.8 % (ref 36–48)
HGB BLD-MCNC: 13.1 G/DL (ref 12–16)
HGB UR QL STRIP.AUTO: NEGATIVE
HYALINE CASTS #/AREA URNS LPF: ABNORMAL /LPF (ref 0–2)
KETONES UR STRIP.AUTO-MCNC: NEGATIVE MG/DL
LEUKOCYTE ESTERASE UR QL STRIP.AUTO: NEGATIVE
LIPASE SERPL-CCNC: 36 U/L (ref 13–60)
LYMPHOCYTES # BLD: 1.9 K/UL (ref 1–5.1)
LYMPHOCYTES NFR BLD: 22.6 %
MAGNESIUM SERPL-MCNC: 1.77 MG/DL (ref 1.8–2.4)
MAGNESIUM SERPL-MCNC: 1.92 MG/DL (ref 1.8–2.4)
MCH RBC QN AUTO: 31.5 PG (ref 26–34)
MCHC RBC AUTO-ENTMCNC: 35.7 G/DL (ref 31–36)
MCV RBC AUTO: 88.3 FL (ref 80–100)
METHGB MFR BLDV: 0.3 %
METHGB MFR BLDV: 0.3 %
MONOCYTES # BLD: 1 K/UL (ref 0–1.3)
MONOCYTES NFR BLD: 11.6 %
MUCOUS THREADS #/AREA URNS LPF: ABNORMAL /LPF
NEUTROPHILS # BLD: 5.3 K/UL (ref 1.7–7.7)
NEUTROPHILS NFR BLD: 64.1 %
NITRITE UR QL STRIP.AUTO: NEGATIVE
O2 CT VFR BLDV CALC: 17 VOL %
O2 CT VFR BLDV CALC: 19 VOL %
O2 THERAPY: ABNORMAL
O2 THERAPY: ABNORMAL
PCO2 BLDV: 34 MMHG (ref 40–50)
PCO2 BLDV: 39.6 MMHG (ref 40–50)
PH BLDV: 7.45 [PH] (ref 7.35–7.45)
PH BLDV: 7.5 [PH] (ref 7.35–7.45)
PH UR STRIP.AUTO: 8.5 [PH] (ref 5–8)
PLATELET # BLD AUTO: 236 K/UL (ref 135–450)
PMV BLD AUTO: 8 FL (ref 5–10.5)
PO2 BLDV: 45 MMHG (ref 25–40)
PO2 BLDV: 75.3 MMHG (ref 25–40)
POTASSIUM SERPL-SCNC: 2.2 MMOL/L (ref 3.5–5.1)
POTASSIUM SERPL-SCNC: 3.4 MMOL/L (ref 3.5–5.1)
PROT SERPL-MCNC: 5.3 G/DL (ref 6.4–8.2)
PROT UR STRIP.AUTO-MCNC: 30 MG/DL
RBC # BLD AUTO: 4.16 M/UL (ref 4–5.2)
RBC #/AREA URNS HPF: ABNORMAL /HPF (ref 0–4)
SAO2 % BLDV: 85 %
SAO2 % BLDV: 96 %
SODIUM SERPL-SCNC: 125 MMOL/L (ref 136–145)
SODIUM SERPL-SCNC: 129 MMOL/L (ref 136–145)
SP GR UR STRIP.AUTO: 1.01 (ref 1–1.03)
TROPONIN, HIGH SENSITIVITY: 13 NG/L (ref 0–14)
UA COMPLETE W REFLEX CULTURE PNL UR: ABNORMAL
UA DIPSTICK W REFLEX MICRO PNL UR: YES
URN SPEC COLLECT METH UR: ABNORMAL
UROBILINOGEN UR STRIP-ACNC: 0.2 E.U./DL
WBC # BLD AUTO: 8.3 K/UL (ref 4–11)
WBC #/AREA URNS HPF: ABNORMAL /HPF (ref 0–5)

## 2025-06-19 PROCEDURE — 74177 CT ABD & PELVIS W/CONTRAST: CPT

## 2025-06-19 PROCEDURE — 6370000000 HC RX 637 (ALT 250 FOR IP): Performed by: STUDENT IN AN ORGANIZED HEALTH CARE EDUCATION/TRAINING PROGRAM

## 2025-06-19 PROCEDURE — 99285 EMERGENCY DEPT VISIT HI MDM: CPT

## 2025-06-19 PROCEDURE — 83690 ASSAY OF LIPASE: CPT

## 2025-06-19 PROCEDURE — 80053 COMPREHEN METABOLIC PANEL: CPT

## 2025-06-19 PROCEDURE — 84484 ASSAY OF TROPONIN QUANT: CPT

## 2025-06-19 PROCEDURE — 85025 COMPLETE CBC W/AUTO DIFF WBC: CPT

## 2025-06-19 PROCEDURE — 96366 THER/PROPH/DIAG IV INF ADDON: CPT

## 2025-06-19 PROCEDURE — 83735 ASSAY OF MAGNESIUM: CPT

## 2025-06-19 PROCEDURE — 6360000004 HC RX CONTRAST MEDICATION: Performed by: STUDENT IN AN ORGANIZED HEALTH CARE EDUCATION/TRAINING PROGRAM

## 2025-06-19 PROCEDURE — 96365 THER/PROPH/DIAG IV INF INIT: CPT

## 2025-06-19 PROCEDURE — 82803 BLOOD GASES ANY COMBINATION: CPT

## 2025-06-19 PROCEDURE — 70450 CT HEAD/BRAIN W/O DYE: CPT

## 2025-06-19 PROCEDURE — 81001 URINALYSIS AUTO W/SCOPE: CPT

## 2025-06-19 PROCEDURE — 2580000003 HC RX 258: Performed by: STUDENT IN AN ORGANIZED HEALTH CARE EDUCATION/TRAINING PROGRAM

## 2025-06-19 PROCEDURE — 6360000002 HC RX W HCPCS: Performed by: STUDENT IN AN ORGANIZED HEALTH CARE EDUCATION/TRAINING PROGRAM

## 2025-06-19 PROCEDURE — 93005 ELECTROCARDIOGRAM TRACING: CPT | Performed by: STUDENT IN AN ORGANIZED HEALTH CARE EDUCATION/TRAINING PROGRAM

## 2025-06-19 PROCEDURE — 36415 COLL VENOUS BLD VENIPUNCTURE: CPT

## 2025-06-19 RX ORDER — LANOLIN ALCOHOL/MO/W.PET/CERES
400 CREAM (GRAM) TOPICAL ONCE
Status: COMPLETED | OUTPATIENT
Start: 2025-06-19 | End: 2025-06-19

## 2025-06-19 RX ORDER — POTASSIUM CHLORIDE 7.45 MG/ML
10 INJECTION INTRAVENOUS
Status: DISPENSED | OUTPATIENT
Start: 2025-06-19 | End: 2025-06-19

## 2025-06-19 RX ORDER — IOPAMIDOL 755 MG/ML
75 INJECTION, SOLUTION INTRAVASCULAR
Status: COMPLETED | OUTPATIENT
Start: 2025-06-19 | End: 2025-06-19

## 2025-06-19 RX ORDER — SODIUM CHLORIDE 9 MG/ML
1000 INJECTION, SOLUTION INTRAVENOUS CONTINUOUS
Status: DISCONTINUED | OUTPATIENT
Start: 2025-06-19 | End: 2025-06-20 | Stop reason: HOSPADM

## 2025-06-19 RX ORDER — SODIUM CHLORIDE, SODIUM LACTATE, POTASSIUM CHLORIDE, AND CALCIUM CHLORIDE .6; .31; .03; .02 G/100ML; G/100ML; G/100ML; G/100ML
1000 INJECTION, SOLUTION INTRAVENOUS ONCE
Status: COMPLETED | OUTPATIENT
Start: 2025-06-19 | End: 2025-06-19

## 2025-06-19 RX ORDER — MECLIZINE HYDROCHLORIDE 25 MG/1
25 TABLET ORAL ONCE
Status: COMPLETED | OUTPATIENT
Start: 2025-06-19 | End: 2025-06-19

## 2025-06-19 RX ADMIN — POTASSIUM CHLORIDE 10 MEQ: 7.46 INJECTION, SOLUTION INTRAVENOUS at 20:43

## 2025-06-19 RX ADMIN — SODIUM CHLORIDE 1000 ML: 0.9 INJECTION, SOLUTION INTRAVENOUS at 19:22

## 2025-06-19 RX ADMIN — IOPAMIDOL 75 ML: 755 INJECTION, SOLUTION INTRAVENOUS at 18:33

## 2025-06-19 RX ADMIN — POTASSIUM CHLORIDE 10 MEQ: 7.46 INJECTION, SOLUTION INTRAVENOUS at 21:49

## 2025-06-19 RX ADMIN — POTASSIUM CHLORIDE 10 MEQ: 7.46 INJECTION, SOLUTION INTRAVENOUS at 19:37

## 2025-06-19 RX ADMIN — MAGNESIUM OXIDE 400 MG (241.3 MG MAGNESIUM) TABLET 400 MG: TABLET at 22:29

## 2025-06-19 RX ADMIN — POTASSIUM BICARBONATE 40 MEQ: 782 TABLET, EFFERVESCENT ORAL at 18:27

## 2025-06-19 RX ADMIN — SODIUM CHLORIDE, SODIUM LACTATE, POTASSIUM CHLORIDE, AND CALCIUM CHLORIDE 1000 ML: .6; .31; .03; .02 INJECTION, SOLUTION INTRAVENOUS at 17:12

## 2025-06-19 RX ADMIN — MECLIZINE HYDROCHLORIDE 25 MG: 25 TABLET ORAL at 19:48

## 2025-06-19 RX ADMIN — POTASSIUM CHLORIDE 10 MEQ: 7.46 INJECTION, SOLUTION INTRAVENOUS at 18:28

## 2025-06-19 ASSESSMENT — PAIN - FUNCTIONAL ASSESSMENT: PAIN_FUNCTIONAL_ASSESSMENT: NONE - DENIES PAIN

## 2025-06-19 ASSESSMENT — LIFESTYLE VARIABLES
HOW MANY STANDARD DRINKS CONTAINING ALCOHOL DO YOU HAVE ON A TYPICAL DAY: PATIENT DOES NOT DRINK
HOW OFTEN DO YOU HAVE A DRINK CONTAINING ALCOHOL: NEVER

## 2025-06-19 NOTE — ED PROVIDER NOTES
[facility] EMERGENCY DEPARTMENT     EMERGENCY DEPARTMENT ENCOUNTER         Pt Name: Tyesha Waterman   MRN: [id number]   Birthdate 1955   Date of evaluation: 6/19/2025   Provider: Lex Potts MD   PCP: No primary care provider on file.   Note Started: 5:20 PM EDT 6/19/25       Chief Complaint     Flank Pain (Patient presents to the ED via EMS from the [facility] c/o right sided flank pain since noon. Patient was seen at the [facility] and dx with UTI. ) and Dizziness (Dizziness when she turns her head since 0400 this AM. Patient denies history of vertigo. )      History of Present Illness     Tyesha Waterman is a 69 y.o. female who presents with 3 to 4 days of urinary urgency or pressure as well as frequency now with some dizziness also visited urgent care and was diagnosed with UTI today.  With the addition of some flank pain and nausea as well she presents for evaluation.  On further questioning she also endorses chills.  She also reports has a history of renal colic and wonders if this is similar with the pressure and pain localized to the right flank and lower quadrant of the abdomen.  Regarding her dizziness it would seem positional without clear vertiginous symptoms.      Review of Systems     Positives and pertinent negatives as per HPI.    Past Medical, Surgical, Family, and Social History     She has a past medical history of A-fib (HCC), Anemia, CAD (coronary artery disease), Cholelithiasis, Complication of anesthesia, Diabetes mellitus ([facility]), GERD (gastroesophageal reflux disease), Granulomatous lung disease (HCC), Hyperlipidemia, Hypertension, MI (myocardial infarction) ([facility]), Nausea & vomiting, ARACELIS (obstructive sleep apnea), PONV (postoperative nausea and vomiting), Primary osteoarthritis of right hip, Prolonged emergence from general anesthesia, and Seasonal allergies.  She has a past surgical history that includes Coronary angioplasty with stent (1/2009); Rotator cuff

## 2025-06-20 ENCOUNTER — TELEPHONE (OUTPATIENT)
Dept: CARDIOLOGY CLINIC | Age: 70
End: 2025-06-20

## 2025-06-20 LAB
EKG ATRIAL RATE: 73 BPM
EKG DIAGNOSIS: NORMAL
EKG P AXIS: 101 DEGREES
EKG P-R INTERVAL: 228 MS
EKG Q-T INTERVAL: 434 MS
EKG QRS DURATION: 102 MS
EKG QTC CALCULATION (BAZETT): 478 MS
EKG R AXIS: -16 DEGREES
EKG T AXIS: 15 DEGREES
EKG VENTRICULAR RATE: 73 BPM

## 2025-06-20 PROCEDURE — 93010 ELECTROCARDIOGRAM REPORT: CPT | Performed by: STUDENT IN AN ORGANIZED HEALTH CARE EDUCATION/TRAINING PROGRAM

## 2025-06-20 NOTE — DISCHARGE INSTRUCTIONS
You were evaluated in the emergency department for flank pain. Assessments and testing completed during your visit were reassuring and at this time there is no indication for further testing, treatment or admission to the hospital. Given this it is appropriate to discharge you from the emergency department. At the time of discharge we discussed the following:    Ideally on your return home I asked that you visit with a medical provider to reassess your lab work with particular attention to your potassium levels as well as sodium and magnesium.  The exact cause of your flank pain remains unclear certainly if your condition worsens please return to the emerge department for evaluation.    Please note that sometimes it is difficult to diagnose a medical condition early in the disease process before the disease is fully manifest. Because of this, should you develop any new or worsening symptoms, you may return at any time to the emergency department for another evaluation. If available you are also recommended to review this visit with your primary care physician or other medical provider in the next 7 days. Thank you for allowing us to care for you today.

## 2025-07-02 ENCOUNTER — TELEPHONE (OUTPATIENT)
Dept: CARDIOLOGY CLINIC | Age: 70
End: 2025-07-02

## 2025-07-02 NOTE — TELEPHONE ENCOUNTER
ALETHA Montoya Jr., MD  [facility] Ep8 minutes ago (5:20 PM)       Please offer her follow up with NP.  Can keep appointment with me, thanks.       Front- please schedule pt with NPKK

## 2025-07-02 NOTE — TELEPHONE ENCOUNTER
Pt called to see if we can offer an earlier appt.  Pt scheduled 9.18.25 AGK.  Pt states her hr goes from 77 to the 90's in mins.  Pt feels like she is going to pass out and has dizzy spells.  Pt states this is the way she felt before her ablasion.  Bp 7.1.25 139/79  7.2.25 hr is 97. Pt was seen in the er on 6.19.25.  please advise if there is an overbook available.  Advised pt that she should go to er if symptoms persist or she feels worse

## 2025-07-03 NOTE — TELEPHONE ENCOUNTER
7/3 First avail with NPKK is 8/29/25. Please advise if this appt time and date ok? If sooner appt needed, please provide OB appt

## 2025-07-07 NOTE — TELEPHONE ENCOUNTER
Spoke with pt. She does not feel it is necessary to see NPKK when she is scheduled to see AGK on 9/18.

## 2025-07-08 ENCOUNTER — TELEPHONE (OUTPATIENT)
Dept: ADMINISTRATIVE | Age: 70
End: 2025-07-08

## (undated) PROCEDURE — 4A0234Z MEASUREMENT OF CARDIAC ELECTRICAL ACTIVITY, PERCUTANEOUS APPROACH: ICD-10-PCS

## (undated) PROCEDURE — 02K83ZZ MAP CONDUCTION MECHANISM, PERCUTANEOUS APPROACH: ICD-10-PCS

## (undated) PROCEDURE — 02583ZZ DESTRUCTION OF CONDUCTION MECHANISM, PERCUTANEOUS APPROACH: ICD-10-PCS

## (undated) PROCEDURE — 4A023FZ MEASUREMENT OF CARDIAC RHYTHM, PERCUTANEOUS APPROACH: ICD-10-PCS

## (undated) DEVICE — Device

## (undated) DEVICE — 150CM STANDARD JWIRE

## (undated) DEVICE — CATHETER ABLAT 8FR L115CM 1-6-2MM SPC TIP 3.5MM DF CRV

## (undated) DEVICE — PINNACLE INTRODUCER SHEATH: Brand: PINNACLE

## (undated) DEVICE — MICROPUNCTURE INTRODUCER SET SILHOUETTE TRANSITIONLESS WITH NITINOL WIRE GUIDE: Brand: MICROPUNCTURE

## (undated) DEVICE — INTRODUCER SHTH 8FR L23CM DIL TIP L35MM BLU SIL COEXTRUDED

## (undated) DEVICE — SOLUTION IV HEPARIN SODIUM SODIUM CHL 0.9% 500 ML INJ VIAFLX

## (undated) DEVICE — MEDIA CONTRAST ISOVUE 370 100ML

## (undated) DEVICE — EP VASCULAR PACK: Brand: MEDLINE INDUSTRIES, INC.

## (undated) DEVICE — INTRODUCER SHTH 9FR CANN L23CM DIL TIP 35MM BLK W/O MINI

## (undated) DEVICE — PATCH REF EXT FOR CARTO 3 SYS (EA = 6 PACKS)

## (undated) DEVICE — CATHETER US 8FR L90CM GRN TIP OVERLAY FOR GE-VIVID I VIVID

## (undated) DEVICE — CATHETER MAP D-F CRV 2.4 MM SPC TRUEREF TECHNOLOGY OPTRELL

## (undated) DEVICE — CATHETER EP 7FR L115CM 2-8-2MM SPC TIP 2MM 10 ELECTRD D CRV